# Patient Record
Sex: FEMALE | Race: WHITE | NOT HISPANIC OR LATINO | Employment: FULL TIME | URBAN - METROPOLITAN AREA
[De-identification: names, ages, dates, MRNs, and addresses within clinical notes are randomized per-mention and may not be internally consistent; named-entity substitution may affect disease eponyms.]

---

## 2017-08-14 ENCOUNTER — HOSPITAL ENCOUNTER (OUTPATIENT)
Dept: RADIOLOGY | Facility: HOSPITAL | Age: 42
Discharge: HOME | End: 2017-08-14
Attending: FAMILY MEDICINE
Payer: COMMERCIAL

## 2017-08-14 DIAGNOSIS — M25.561 KNEE PAIN, RIGHT: ICD-10-CM

## 2017-08-14 DIAGNOSIS — M25.561 KNEE PAIN, RIGHT: Primary | ICD-10-CM

## 2017-08-14 PROCEDURE — 73560 X-RAY EXAM OF KNEE 1 OR 2: CPT | Mod: 26,RT,, | Performed by: RADIOLOGY

## 2017-08-14 PROCEDURE — 73560 X-RAY EXAM OF KNEE 1 OR 2: CPT | Mod: TC,RT

## 2017-10-19 DIAGNOSIS — Z01.818 PRE-OP EVALUATION: Primary | ICD-10-CM

## 2017-10-26 ENCOUNTER — HOSPITAL ENCOUNTER (OUTPATIENT)
Dept: RADIOLOGY | Facility: OTHER | Age: 42
Discharge: HOME | End: 2017-10-26
Attending: PLASTIC SURGERY
Payer: COMMERCIAL

## 2017-10-26 DIAGNOSIS — Z01.818 PRE-OP EVALUATION: ICD-10-CM

## 2017-10-26 PROCEDURE — 25500020 PHARM REV CODE 255: Performed by: PLASTIC SURGERY

## 2017-10-26 PROCEDURE — 74174 CTA ABD&PLVS W/CONTRAST: CPT | Mod: TC

## 2017-10-26 PROCEDURE — 73706 CT ANGIO LWR EXTR W/O&W/DYE: CPT | Mod: TC,50

## 2017-10-26 PROCEDURE — 73706 CT ANGIO LWR EXTR W/O&W/DYE: CPT | Mod: 26,50,, | Performed by: RADIOLOGY

## 2017-10-26 PROCEDURE — 74174 CTA ABD&PLVS W/CONTRAST: CPT | Mod: 26,,, | Performed by: RADIOLOGY

## 2017-10-26 RX ADMIN — IOHEXOL 100 ML: 350 INJECTION, SOLUTION INTRAVENOUS at 02:10

## 2018-01-10 ENCOUNTER — OFFICE VISIT (OUTPATIENT)
Dept: URGENT CARE | Facility: CLINIC | Age: 43
End: 2018-01-10
Payer: COMMERCIAL

## 2018-01-10 VITALS
DIASTOLIC BLOOD PRESSURE: 90 MMHG | TEMPERATURE: 99 F | HEIGHT: 65 IN | SYSTOLIC BLOOD PRESSURE: 130 MMHG | OXYGEN SATURATION: 97 % | WEIGHT: 152 LBS | HEART RATE: 111 BPM | BODY MASS INDEX: 25.33 KG/M2

## 2018-01-10 DIAGNOSIS — J11.1 INFLUENZA: Primary | ICD-10-CM

## 2018-01-10 DIAGNOSIS — R52 BODY ACHES: ICD-10-CM

## 2018-01-10 DIAGNOSIS — R05.9 COUGH: ICD-10-CM

## 2018-01-10 PROCEDURE — 99203 OFFICE O/P NEW LOW 30 MIN: CPT | Mod: S$GLB,,, | Performed by: FAMILY MEDICINE

## 2018-01-10 RX ORDER — ANASTROZOLE 1 MG/1
TABLET ORAL
Refills: 2 | COMMUNITY
Start: 2017-12-12 | End: 2022-09-30

## 2018-01-10 RX ORDER — OSELTAMIVIR PHOSPHATE 75 MG/1
75 CAPSULE ORAL 2 TIMES DAILY
Qty: 10 CAPSULE | Refills: 0 | Status: SHIPPED | OUTPATIENT
Start: 2018-01-10 | End: 2018-01-15

## 2018-01-10 NOTE — PROGRESS NOTES
"Subjective:       Patient ID: Ksenia Arce is a 42 y.o. female.    Vitals:  height is 5' 5" (1.651 m) and weight is 68.9 kg (152 lb). Her temperature is 99.3 °F (37.4 °C). Her blood pressure is 130/90 (abnormal) and her pulse is 111 (abnormal). Her oxygen saturation is 97%.     Chief Complaint: URI    URI    This is a new problem. The current episode started yesterday. The problem has been gradually worsening. Associated symptoms include congestion, coughing, ear pain, headaches, nausea and a sore throat. Pertinent negatives include no abdominal pain, chest pain or wheezing. Treatments tried: Benadryl and Ibuprofen  The treatment provided no relief.     Review of Systems   Constitution: Positive for chills and malaise/fatigue. Negative for fever.   HENT: Positive for congestion, ear pain and sore throat. Negative for hoarse voice.    Eyes: Negative for discharge and redness.   Cardiovascular: Negative for chest pain, dyspnea on exertion and leg swelling.   Respiratory: Positive for cough. Negative for shortness of breath, sputum production and wheezing.    Musculoskeletal: Negative for myalgias.   Gastrointestinal: Positive for nausea. Negative for abdominal pain.   Neurological: Positive for headaches.       Objective:      Physical Exam   Constitutional: She is oriented to person, place, and time. She appears well-developed and well-nourished. She is cooperative.  Non-toxic appearance. She has a sickly appearance. She does not appear ill. No distress.   HENT:   Head: Normocephalic and atraumatic.   Right Ear: Hearing, tympanic membrane, external ear and ear canal normal.   Left Ear: Hearing, tympanic membrane, external ear and ear canal normal.   Nose: Nose normal. No mucosal edema, rhinorrhea or nasal deformity. No epistaxis. Right sinus exhibits no maxillary sinus tenderness and no frontal sinus tenderness. Left sinus exhibits no maxillary sinus tenderness and no frontal sinus tenderness.   Mouth/Throat: Uvula " is midline and mucous membranes are normal. No trismus in the jaw. Normal dentition. No uvula swelling. Posterior oropharyngeal erythema present.   Eyes: Conjunctivae and lids are normal. No scleral icterus.   Sclera clear bilat   Neck: Trachea normal, full passive range of motion without pain and phonation normal. Neck supple.   Cardiovascular: Normal rate, regular rhythm, normal heart sounds, intact distal pulses and normal pulses.    Pulmonary/Chest: Effort normal and breath sounds normal. No respiratory distress. She has no decreased breath sounds. She has no wheezes. She has no rhonchi. She has no rales.   Abdominal: Soft. Normal appearance and bowel sounds are normal. She exhibits no distension. There is no tenderness.   Musculoskeletal: Normal range of motion. She exhibits no edema or deformity.   Lymphadenopathy:        Head (right side): No submental and no submandibular adenopathy present.        Head (left side): No submental and no submandibular adenopathy present.        Right cervical: No superficial cervical adenopathy present.       Left cervical: No superficial cervical adenopathy present.   Neurological: She is alert and oriented to person, place, and time. She exhibits normal muscle tone. Coordination normal.   Skin: Skin is warm, dry and intact. She is not diaphoretic. No pallor.   Psychiatric: She has a normal mood and affect. Her speech is normal and behavior is normal. Judgment and thought content normal. Cognition and memory are normal.   Nursing note and vitals reviewed.      Assessment:       1. Influenza    2. Cough    3. Body aches        Plan:         Influenza  -     oseltamivir (TAMIFLU) 75 MG capsule; Take 1 capsule (75 mg total) by mouth 2 (two) times daily.  Dispense: 10 capsule; Refill: 0    Cough  -     POCT Influenza A/B    Body aches      Follow Up Comments   Make sure that you follow up with your primary care doctor in the next 2-5 days if needed .  Return to the Urgent Care if  signs or symptoms change and certainly if you have worsening symptoms go to the nearest emergency department for further evaluation.

## 2018-01-10 NOTE — LETTER
January 10, 2018      [facility] [place] [address]  Phone: [phone]  Fax: [phone]       Patient: Ksenia Arce   YOB: 1975  Date of Visit: 01/10/2018    To Whom It May Concern:    Jaswant Arce  was at [facility] on 01/10/2018. She may return to work/school on 01/12/2018 with no restrictions. If you have any questions or concerns, or if I can be of further assistance, please do not hesitate to contact me.    Sincerely,    Josselyn Shin MD

## 2018-01-10 NOTE — PATIENT INSTRUCTIONS
Influenza (Adult)    Influenza is also called the flu. It is a viral illness that affects the air passages of your lungs. It is different from the common cold. The flu can easily be passed from one to person to another. It may be spread through the air by coughing and sneezing. Or it can be spread by touching the sick person and then touching your own eyes, nose, or mouth.  The flu starts 1 to 3 days after you are exposed to the flu virus. It may last for 1 to 2 weeks but many people feel tired or fatigued for many weeks afterward. You usually dont need to take antibiotics unless you have a complication. This might be an ear or sinus infection or pneumonia.  Symptoms of the flu may be mild or severe. They can include extreme tiredness (wanting to stay in bed all day), chills, fevers, muscle aches, soreness with eye movement, headache, and a dry, hacking cough.  Home care  Follow these guidelines when caring for yourself at home:  · Avoid being around cigarette smoke, whether yours or other peoples.  · Acetaminophen or ibuprofen will help ease your fever, muscle aches, and headache. Dont give aspirin to anyone younger than 18 who has the flu. Aspirin can harm the liver.  · Nausea and loss of appetite are common with the flu. Eat light meals. Drink 6 to 8 glasses of liquids every day. Good choices are water, sport drinks, soft drinks without caffeine, juices, tea, and soup. Extra fluids will also help loosen secretions in your nose and lungs.  · Over-the-counter cold medicines will not make the flu go away faster. But the medicines may help with coughing, sore throat, and congestion in your nose and sinuses. Dont use a decongestant if you have high blood pressure.  · Stay home until your fever has been gone for at least 24 hours without using medicine to reduce fever.  Follow-up care  Follow up with your healthcare provider, or as advised, if you are not getting better over the next week.  If you are age 65 or  older, talk with your provider about getting a pneumococcal vaccine every 5 years. You should also get this vaccine if you have chronic asthma or COPD. All adults should get a flu vaccine every fall. Ask your provider about this.  When to seek medical advice  Call your healthcare provider right away if any of these occur:  · Cough with lots of colored mucus (sputum) or blood in your mucus  · Chest pain, shortness of breath, wheezing, or trouble breathing  · Severe headache, or face, neck, or ear pain  · New rash with fever  · Fever of 100.4°F (38°C) or higher, or as directed by your healthcare provider  · Confusion, behavior change, or seizure  · Severe weakness or dizziness  · You get a new fever or cough after getting better for a few days  Date Last Reviewed: 1/1/2017 © 2000-2017 [employer]. [address] [place] [address]. All rights reserved. This information is not intended as a substitute for professional medical care. Always follow your healthcare professional's instructions.      Ksenia was seen today for uri.    Diagnoses and all orders for this visit:    Influenza  -     oseltamivir (TAMIFLU) 75 MG capsule; Take 1 capsule (75 mg total) by mouth 2 (two) times daily.    Cough  -     POCT Influenza A/B    Body aches            Follow Up Comments   Make sure that you follow up with your primary care doctor in the next 2-5 days if needed .  Return to the Urgent Care if signs or symptoms change and certainly if you have worsening symptoms go to the nearest emergency department for further evaluation.     Josselyn Shin MD

## 2018-01-14 ENCOUNTER — OFFICE VISIT (OUTPATIENT)
Dept: URGENT CARE | Facility: CLINIC | Age: 43
End: 2018-01-14
Payer: COMMERCIAL

## 2018-01-14 VITALS
HEART RATE: 108 BPM | TEMPERATURE: 99 F | OXYGEN SATURATION: 97 % | BODY MASS INDEX: 25.33 KG/M2 | HEIGHT: 65 IN | WEIGHT: 152 LBS | RESPIRATION RATE: 16 BRPM | DIASTOLIC BLOOD PRESSURE: 92 MMHG | SYSTOLIC BLOOD PRESSURE: 135 MMHG

## 2018-01-14 DIAGNOSIS — J04.0 LARYNGITIS: Primary | ICD-10-CM

## 2018-01-14 DIAGNOSIS — R68.83 CHILLS (WITHOUT FEVER): ICD-10-CM

## 2018-01-14 DIAGNOSIS — R05.9 COUGH: ICD-10-CM

## 2018-01-14 LAB
CTP QC/QA: YES
FLUAV AG NPH QL: NEGATIVE
FLUBV AG NPH QL: NEGATIVE

## 2018-01-14 PROCEDURE — 96372 THER/PROPH/DIAG INJ SC/IM: CPT | Mod: S$GLB,,, | Performed by: FAMILY MEDICINE

## 2018-01-14 PROCEDURE — 87804 INFLUENZA ASSAY W/OPTIC: CPT | Mod: QW,S$GLB,, | Performed by: FAMILY MEDICINE

## 2018-01-14 PROCEDURE — 99214 OFFICE O/P EST MOD 30 MIN: CPT | Mod: 25,S$GLB,, | Performed by: FAMILY MEDICINE

## 2018-01-14 RX ORDER — PROMETHAZINE HYDROCHLORIDE AND CODEINE PHOSPHATE 6.25; 1 MG/5ML; MG/5ML
5 SOLUTION ORAL EVERY 6 HOURS PRN
Qty: 118 ML | Refills: 0 | Status: SHIPPED | OUTPATIENT
Start: 2018-01-14 | End: 2019-05-13

## 2018-01-14 RX ORDER — AZITHROMYCIN 250 MG/1
TABLET, FILM COATED ORAL
Qty: 6 TABLET | Refills: 0 | Status: SHIPPED | OUTPATIENT
Start: 2018-01-14 | End: 2019-05-13

## 2018-01-14 RX ORDER — FLUTICASONE PROPIONATE 50 MCG
2 SPRAY, SUSPENSION (ML) NASAL DAILY
Qty: 1 BOTTLE | Refills: 0 | Status: SHIPPED | OUTPATIENT
Start: 2018-01-14 | End: 2019-01-14

## 2018-01-14 RX ORDER — BETAMETHASONE SODIUM PHOSPHATE AND BETAMETHASONE ACETATE 3; 3 MG/ML; MG/ML
6 INJECTION, SUSPENSION INTRA-ARTICULAR; INTRALESIONAL; INTRAMUSCULAR; SOFT TISSUE
Status: COMPLETED | OUTPATIENT
Start: 2018-01-14 | End: 2018-01-14

## 2018-01-14 RX ADMIN — BETAMETHASONE SODIUM PHOSPHATE AND BETAMETHASONE ACETATE 6 MG: 3; 3 INJECTION, SUSPENSION INTRA-ARTICULAR; INTRALESIONAL; INTRAMUSCULAR; SOFT TISSUE at 11:01

## 2018-01-14 NOTE — LETTER
January 14, 2018      [facility] [place] [address]  Phone: [phone]  Fax: [phone]       Patient: Ksenia Arce   YOB: 1975  Date of Visit: 01/14/2018    To Whom It May Concern:    Jaswant Arce  was at [facility] on 01/14/2018. She may return to work/school on 01/16/2018 with no restrictions. If you have any questions or concerns, or if I can be of further assistance, please do not hesitate to contact me.    Sincerely,    Josselyn Shin MD

## 2018-01-14 NOTE — PROGRESS NOTES
"Subjective:       Patient ID: Ksenia Arce is a 42 y.o. female.    Vitals:  height is 5' 5" (1.651 m) and weight is 68.9 kg (152 lb). Her temperature is 98.6 °F (37 °C). Her blood pressure is 135/92 (abnormal) and her pulse is 108. Her respiration is 16 and oxygen saturation is 97%.     Chief Complaint: Cough    Cough   This is a new problem. Episode onset: 3 days. The problem has been gradually worsening. The problem occurs every few minutes. The cough is productive of sputum. Associated symptoms include chills, ear congestion, myalgias, nasal congestion and a sore throat. Pertinent negatives include no chest pain, ear pain, eye redness, fever, headaches, shortness of breath or wheezing. The symptoms are aggravated by lying down. She has tried prescription cough suppressant and OTC cough suppressant for the symptoms.     Review of Systems   Constitution: Positive for chills and malaise/fatigue. Negative for fever.   HENT: Positive for congestion and sore throat. Negative for ear pain and hoarse voice.    Eyes: Negative for discharge and redness.   Cardiovascular: Negative for chest pain, dyspnea on exertion and leg swelling.   Respiratory: Positive for cough and sputum production. Negative for shortness of breath and wheezing.    Musculoskeletal: Positive for myalgias.   Gastrointestinal: Positive for nausea. Negative for abdominal pain.   Neurological: Negative for headaches.       Objective:      Physical Exam   Constitutional: She is oriented to person, place, and time. She appears well-developed and well-nourished. She is cooperative.  Non-toxic appearance. She does not appear ill. No distress.   HENT:   Head: Normocephalic and atraumatic.   Right Ear: Hearing, tympanic membrane, external ear and ear canal normal.   Left Ear: Hearing, tympanic membrane, external ear and ear canal normal.   Nose: Nose normal. No mucosal edema, rhinorrhea or nasal deformity. No epistaxis. Right sinus exhibits no maxillary sinus " tenderness and no frontal sinus tenderness. Left sinus exhibits no maxillary sinus tenderness and no frontal sinus tenderness.   Mouth/Throat: Uvula is midline and mucous membranes are normal. No trismus in the jaw. Normal dentition. No uvula swelling. Posterior oropharyngeal edema and posterior oropharyngeal erythema present.   Eyes: Conjunctivae and lids are normal. No scleral icterus.   Sclera clear bilat   Neck: Trachea normal and full passive range of motion without pain. Neck supple.   Pat has lost her voice,  Whispering present.     Cardiovascular: Normal rate, regular rhythm, normal heart sounds, intact distal pulses and normal pulses.    Pulmonary/Chest: Effort normal and breath sounds normal. No respiratory distress. She has no decreased breath sounds. She has no wheezes. She has no rhonchi. She has no rales.   Abdominal: Soft. Normal appearance and bowel sounds are normal. She exhibits no distension. There is no tenderness.   Musculoskeletal: Normal range of motion. She exhibits no edema or deformity.   Neurological: She is alert and oriented to person, place, and time. She exhibits normal muscle tone. Coordination normal.   Skin: Skin is warm, dry and intact. She is not diaphoretic. No pallor.   Psychiatric: She has a normal mood and affect. Her speech is normal and behavior is normal. Judgment and thought content normal. Cognition and memory are normal.   Nursing note and vitals reviewed.      Assessment:       1. Laryngitis    2. Chills (without fever)    3. Cough        Plan:         Laryngitis  -     betamethasone acetate-betamethasone sodium phosphate injection 6 mg; Inject 1 mL (6 mg total) into the muscle one time.  -     fluticasone (FLONASE) 50 mcg/actuation nasal spray; 2 sprays (100 mcg total) by Each Nare route once daily.  Dispense: 1 Bottle; Refill: 0  -     azithromycin (ZITHROMAX Z-CRYS) 250 MG tablet; Take 2 tablets (500 mg) on  Day 1,  followed by 1 tablet (250 mg) once daily on Days  2 through 5.  Dispense: 6 tablet; Refill: 0  -     promethazine-codeine 6.25-10 mg/5 ml (PHENERGAN WITH CODEINE) 6.25-10 mg/5 mL syrup; Take 5 mLs by mouth every 6 (six) hours as needed.  Dispense: 118 mL; Refill: 0    Chills (without fever)  -     POCT Influenza A/B    Cough  -     betamethasone acetate-betamethasone sodium phosphate injection 6 mg; Inject 1 mL (6 mg total) into the muscle one time.  -     fluticasone (FLONASE) 50 mcg/actuation nasal spray; 2 sprays (100 mcg total) by Each Nare route once daily.  Dispense: 1 Bottle; Refill: 0  -     azithromycin (ZITHROMAX Z-CRYS) 250 MG tablet; Take 2 tablets (500 mg) on  Day 1,  followed by 1 tablet (250 mg) once daily on Days 2 through 5.  Dispense: 6 tablet; Refill: 0  -     promethazine-codeine 6.25-10 mg/5 ml (PHENERGAN WITH CODEINE) 6.25-10 mg/5 mL syrup; Take 5 mLs by mouth every 6 (six) hours as needed.  Dispense: 118 mL; Refill: 0      Follow Up Comments   Make sure that you follow up with your primary care doctor in the next 2-5 days if needed .  Return to the Urgent Care if signs or symptoms change and certainly if you have worsening symptoms go to the nearest emergency department for further evaluation.

## 2018-01-14 NOTE — PATIENT INSTRUCTIONS
Laryngitis    Laryngitis is a swelling of the tissues around the vocal cords. Symptoms include a hoarse (scratchy) voice. The voice may be lost completely. It may be caused by a viral illness, such as a head or chest cold. It may also be due to overuse and strain of the voice. Smoking, drinking alcohol, acid reflux, allergies, or inhaling harsh chemicals may also lead to symptoms. This condition will usually resolve in 1-2 weeks.  Home care  · Rest your voice until it recovers. Talk as little as possible. If your symptoms are severe, rest at home for a day or so.  · Breathing cool steam from a humidifier/vaporizer or in a steamy shower may be helpful.  · Drink plenty of fluids to stay well hydrated.  · Do not smoke  Follow-up care  Follow up with your healthcare provider or this facility if you have not improved after one week.  When to seek medical advice  Contact your healthcare provider for any of the following:  · Severe pain with swallowing  · Trouble opening mouth  · Neck swelling, neck pain, or trouble moving neck  · Noisy breathing or trouble breathing  · Fever of 100.4°F (38.ºC) or higher, or as directed by your healthcare provider  · Drooling  · Symptoms do not resolve in 2 weeks  Date Last Reviewed: 4/26/2015 © 2000-2017 [employer]. [address] [place] [address], [place] [address]. All rights reserved. This information is not intended as a substitute for professional medical care. Always follow your healthcare professional's instructions.      Ksenia was seen today for cough.    Diagnoses and all orders for this visit:    Laryngitis  -     betamethasone acetate-betamethasone sodium phosphate injection 6 mg; Inject 1 mL (6 mg total) into the muscle one time.  -     fluticasone (FLONASE) 50 mcg/actuation nasal spray; 2 sprays (100 mcg total) by Each Nare route once daily.  -     azithromycin (ZITHROMAX Z-CRYS) 250 MG tablet; Take 2 tablets (500 mg) on  Day 1,  followed by 1 tablet (250 mg) once  daily on Days 2 through 5.  -     promethazine-codeine 6.25-10 mg/5 ml (PHENERGAN WITH CODEINE) 6.25-10 mg/5 mL syrup; Take 5 mLs by mouth every 6 (six) hours as needed.    Chills (without fever)  -     POCT Influenza A/B    Cough  -     betamethasone acetate-betamethasone sodium phosphate injection 6 mg; Inject 1 mL (6 mg total) into the muscle one time.  -     fluticasone (FLONASE) 50 mcg/actuation nasal spray; 2 sprays (100 mcg total) by Each Nare route once daily.  -     azithromycin (ZITHROMAX Z-CRYS) 250 MG tablet; Take 2 tablets (500 mg) on  Day 1,  followed by 1 tablet (250 mg) once daily on Days 2 through 5.  -     promethazine-codeine 6.25-10 mg/5 ml (PHENERGAN WITH CODEINE) 6.25-10 mg/5 mL syrup; Take 5 mLs by mouth every 6 (six) hours as needed.            Follow Up Comments   Make sure that you follow up with your primary care doctor in the next 2-5 days if needed .  Return to the Urgent Care if signs or symptoms change and certainly if you have worsening symptoms go to the nearest emergency department for further evaluation.     Josselyn Shin MD

## 2018-04-28 ENCOUNTER — OFFICE VISIT (OUTPATIENT)
Dept: URGENT CARE | Facility: CLINIC | Age: 43
End: 2018-04-28
Payer: COMMERCIAL

## 2018-04-28 VITALS
HEIGHT: 65 IN | HEART RATE: 97 BPM | BODY MASS INDEX: 25.33 KG/M2 | TEMPERATURE: 98 F | OXYGEN SATURATION: 98 % | WEIGHT: 152 LBS | DIASTOLIC BLOOD PRESSURE: 75 MMHG | SYSTOLIC BLOOD PRESSURE: 105 MMHG

## 2018-04-28 DIAGNOSIS — H10.9 CONJUNCTIVITIS OF LEFT EYE, UNSPECIFIED CONJUNCTIVITIS TYPE: Primary | ICD-10-CM

## 2018-04-28 PROCEDURE — 99214 OFFICE O/P EST MOD 30 MIN: CPT | Mod: S$GLB,,, | Performed by: PHYSICIAN ASSISTANT

## 2018-04-28 RX ORDER — POLYMYXIN B SULFATE AND TRIMETHOPRIM 1; 10000 MG/ML; [USP'U]/ML
1 SOLUTION OPHTHALMIC EVERY 6 HOURS
Qty: 10 ML | Refills: 0 | Status: SHIPPED | OUTPATIENT
Start: 2018-04-28 | End: 2019-05-13

## 2018-04-28 NOTE — PATIENT INSTRUCTIONS
Conjunctivitis, Nonspecific    The membrane that covers the white part of your eye (the conjunctiva) is inflamed. Inflammation happens when your body responds to an injury, allergic reaction, infection, or illness. Symptoms of inflammation in the eye may include redness, irritation, itching, swelling, or burning. These symptoms should go away within the next 24 hours. Conjunctivitis may be related to a particle that was in your eye. If so, it may wash out with your tears or irrigation treatment. Being exposed to liquid chemicals or fumes may also cause this reaction.   Home care  · Apply a cold pack (ice in a plastic bag, wrapped in a towel) over the eye for 20 minutes at a time. This will reduce pain.  · Artificial tears may be prescribed to reduce irritation or redness.  These should be used 3 to 4 times a day.  · You may use acetaminophen or ibuprofen to control pain, unless another medicine was prescribed.(Note: If you have chronic liver or kidney disease, or if you have ever had a stomach ulcer or gastrointestinal bleeding, talk with your healthcare provider before using these medicines.)  Follow-up care  Follow up with your healthcare provider, or as advised.  When to seek medical advice  Call your healthcare provider right away if any of these occur:  · Increased eyelid swelling  · Increased eye pain  · Increased redness or drainage from the eye  · Increased blurry vision or increased sensitivity to light  · Failure of normal vision to return within 24 to 48 hours  Date Last Reviewed: 6/14/2015  © 4058-3682 [employer]. [address] [place] [address], [place] [id number]. All rights reserved. This information is not intended as a substitute for professional medical care. Always follow your healthcare professional's instructions.      Please follow up with your Primary care provider within 2-5 days if your signs and symptoms have not resolved or worsen.     If your condition worsens or fails to improve  we recommend that you receive another evaluation at the emergency room immediately or contact your primary medical clinic to discuss your concerns.   You must understand that you have received an Urgent Care treatment only and that you may be released before all of your medical problems are known or treated. You, the patient, will arrange for follow up care as instructed.

## 2018-04-28 NOTE — PROGRESS NOTES
"Subjective:       Patient ID: Ksenia Arce is a 42 y.o. female.    Vitals:  height is 5' 5" (1.651 m) and weight is 68.9 kg (152 lb). Her temperature is 98.2 °F (36.8 °C). Her blood pressure is 105/75 and her pulse is 97. Her oxygen saturation is 98%.     Chief Complaint: Conjunctivitis    Pt states that when she blinks she feels like something is in her eye. Denies visual change.      Conjunctivitis   This is a new problem. The current episode started yesterday. The problem occurs constantly. The problem has been unchanged. Pertinent negatives include no chills, congestion, fever, headaches, nausea, rash, sore throat, swollen glands, urinary symptoms, vertigo, visual change, vomiting or weakness. Nothing aggravates the symptoms. She has tried acetaminophen for the symptoms. The treatment provided no relief.     Review of Systems   Constitution: Negative for chills, fever and weakness.   HENT: Negative for congestion and sore throat.    Eyes: Positive for discharge, pain and redness. Negative for blurred vision and photophobia.   Skin: Negative for rash.   Gastrointestinal: Negative for nausea and vomiting.   Neurological: Negative for headaches and vertigo.       Objective:      Physical Exam   Constitutional: She is oriented to person, place, and time. She appears well-developed and well-nourished.   HENT:   Head: Normocephalic and atraumatic.   Right Ear: External ear normal.   Left Ear: External ear normal.   Nose: Nose normal.   Mouth/Throat: Oropharynx is clear and moist.   Eyes: EOM and lids are normal. Pupils are equal, round, and reactive to light. Right eye exhibits no chemosis, no discharge, no exudate and no hordeolum. No foreign body present in the right eye. Left eye exhibits exudate. Left eye exhibits no chemosis, no discharge and no hordeolum. No foreign body present in the left eye. Right conjunctiva is not injected. Right conjunctiva has no hemorrhage. Left conjunctiva is injected. Left conjunctiva " has no hemorrhage. No scleral icterus.   No signs of hyphema, hypopyon, corneal ulcer, corneal flare, foreign body, or corneal abrasion.    Neck: Trachea normal, full passive range of motion without pain and phonation normal. Neck supple.   Musculoskeletal: Normal range of motion.   Neurological: She is alert and oriented to person, place, and time.   Skin: Skin is warm, dry and intact.   Psychiatric: She has a normal mood and affect. Her speech is normal and behavior is normal. Judgment and thought content normal. Cognition and memory are normal.   Nursing note and vitals reviewed.      Assessment:       1. Conjunctivitis of left eye, unspecified conjunctivitis type        Plan:         Conjunctivitis of left eye, unspecified conjunctivitis type  -     polymyxin B sulf-trimethoprim (POLYTRIM) 10,000 unit- 1 mg/mL Drop; Place 1 drop into the left eye every 6 (six) hours.  Dispense: 10 mL; Refill: 0        Conjunctivitis, Nonspecific    The membrane that covers the white part of your eye (the conjunctiva) is inflamed. Inflammation happens when your body responds to an injury, allergic reaction, infection, or illness. Symptoms of inflammation in the eye may include redness, irritation, itching, swelling, or burning. These symptoms should go away within the next 24 hours. Conjunctivitis may be related to a particle that was in your eye. If so, it may wash out with your tears or irrigation treatment. Being exposed to liquid chemicals or fumes may also cause this reaction.   Home care  · Apply a cold pack (ice in a plastic bag, wrapped in a towel) over the eye for 20 minutes at a time. This will reduce pain.  · Artificial tears may be prescribed to reduce irritation or redness.  These should be used 3 to 4 times a day.  · You may use acetaminophen or ibuprofen to control pain, unless another medicine was prescribed.(Note: If you have chronic liver or kidney disease, or if you have ever had a stomach ulcer or  gastrointestinal bleeding, talk with your healthcare provider before using these medicines.)  Follow-up care  Follow up with your healthcare provider, or as advised.  When to seek medical advice  Call your healthcare provider right away if any of these occur:  · Increased eyelid swelling  · Increased eye pain  · Increased redness or drainage from the eye  · Increased blurry vision or increased sensitivity to light  · Failure of normal vision to return within 24 to 48 hours  Date Last Reviewed: 6/14/2015  © 4555-4654 [employer]. [address] [place] [address]. All rights reserved. This information is not intended as a substitute for professional medical care. Always follow your healthcare professional's instructions.      Please follow up with your Primary care provider within 2-5 days if your signs and symptoms have not resolved or worsen.     If your condition worsens or fails to improve we recommend that you receive another evaluation at the emergency room immediately or contact your primary medical clinic to discuss your concerns.   You must understand that you have received an Urgent Care treatment only and that you may be released before all of your medical problems are known or treated. You, the patient, will arrange for follow up care as instructed.

## 2018-05-01 ENCOUNTER — TELEPHONE (OUTPATIENT)
Dept: URGENT CARE | Facility: CLINIC | Age: 43
End: 2018-05-01

## 2019-05-13 ENCOUNTER — OFFICE VISIT (OUTPATIENT)
Dept: URGENT CARE | Facility: CLINIC | Age: 44
End: 2019-05-13
Payer: COMMERCIAL

## 2019-05-13 VITALS
WEIGHT: 152 LBS | SYSTOLIC BLOOD PRESSURE: 125 MMHG | HEART RATE: 76 BPM | HEIGHT: 65 IN | TEMPERATURE: 99 F | BODY MASS INDEX: 25.33 KG/M2 | DIASTOLIC BLOOD PRESSURE: 71 MMHG | RESPIRATION RATE: 16 BRPM

## 2019-05-13 DIAGNOSIS — L30.9 DERMATITIS: Primary | ICD-10-CM

## 2019-05-13 PROCEDURE — 99203 PR OFFICE/OUTPT VISIT, NEW, LEVL III, 30-44 MIN: ICD-10-PCS | Mod: S$GLB,,, | Performed by: PHYSICIAN ASSISTANT

## 2019-05-13 PROCEDURE — 3008F BODY MASS INDEX DOCD: CPT | Mod: CPTII,S$GLB,, | Performed by: PHYSICIAN ASSISTANT

## 2019-05-13 PROCEDURE — 99203 OFFICE O/P NEW LOW 30 MIN: CPT | Mod: S$GLB,,, | Performed by: PHYSICIAN ASSISTANT

## 2019-05-13 PROCEDURE — 3008F PR BODY MASS INDEX (BMI) DOCUMENTED: ICD-10-PCS | Mod: CPTII,S$GLB,, | Performed by: PHYSICIAN ASSISTANT

## 2019-05-13 RX ORDER — HYDROXYZINE HYDROCHLORIDE 25 MG/1
25 TABLET, FILM COATED ORAL 3 TIMES DAILY PRN
Qty: 12 TABLET | Refills: 0 | Status: SHIPPED | OUTPATIENT
Start: 2019-05-13 | End: 2020-01-23

## 2019-05-13 RX ORDER — FLUOXETINE 20 MG/5ML
SOLUTION ORAL DAILY
COMMUNITY
End: 2020-01-23

## 2019-05-13 RX ORDER — PREDNISONE 20 MG/1
40 TABLET ORAL DAILY
Qty: 8 TABLET | Refills: 0 | Status: SHIPPED | OUTPATIENT
Start: 2019-05-13 | End: 2019-05-17

## 2019-05-13 RX ORDER — FLUOCINOLONE ACETONIDE 0.11 MG/ML
5 OIL AURICULAR (OTIC) 2 TIMES DAILY
Qty: 1 BOTTLE | Refills: 0 | Status: SHIPPED | OUTPATIENT
Start: 2019-05-13 | End: 2019-05-20

## 2019-05-13 NOTE — PATIENT INSTRUCTIONS
Nonspecific Dermatitis  Dermatitis is a skin rash caused by something that touches the skin and makes it irritated and inflamed.  Your skin may be red, swollen, dry, and may be cracked. Blisters may form and ooze. The rash will itch.  Dermatitis can form on the face and neck, backs of hands, forearms, genitals, and lower legs. Dermatitis is not passed from person to person.  Talk with your health care provider about what may have caused the rash. Common things that cause skin allergies are metal in jewelry, plants like poison ivy or poison oak, and certain skin care products. You will need to avoid the source of your rash in the future to prevent it from coming back. In some cases, the cause of the dermatitis may not be found.  Treatment is done to relieve itching and prevent the rash from coming back. The rash should go away in a few days to a few weeks.  Home care  The health care provider may prescribe medications to relieve swelling and itching. Follow all instructions when using these medications.  · Avoid anything that heats up your skin, such as hot showers or baths, or direct sunlight. This can make itching worse.  · Stay away from whatever you think caused the rash.  · Bathe in warm, not hot, water. Apply a moisturizing lotion after bathing to prevent dry skin.  · Avoid skin irritants such as wool or silk clothing, grease, oils, harsh soaps, and detergents.  · Apply cold compresses to soothe your sores to help relieve your symptoms. Do this for 30 minutes 3 to 4 times a day. You can make a cold compress by soaking a cloth in cold water. Squeeze out excess water. You can add colloidal oatmeal to the water to help reduce itching. For severe itching in a small area, apply an ice pack wrapped in a thin towel. Do this for 20 minutes 3 to 4 times a day.  · You can also help relieve large areas of itching by taking a lukewarm bath with colloidal oatmeal added to the water.  · Use hydrocortisone cream for redness  and irritation, unless another medicine was prescribed. You can also use benzocaine anesthetic cream or spray.  · Use oral diphenhydramine to help reduce itching. This is an antihistamine you can buy at drug and grocery stores. It can make you sleepy, so use lower doses during the daytime. Or you can use loratadine. This is an antihistamine that will not make you sleepy. Dont use diphenhydramine if you have glaucoma or have trouble urinating because of an enlarged prostate.  · Wash your hands or use an antibacterial gel often to prevent the spread of the rash.  Follow-up care  Follow up with your health care provider. Make an appointment with your health care provider if your symptoms do not get better in the next 1 to 2 weeks.  When to seek medical advice  Call your health care provider right away if any of these occur:  · Spreading of the rash to other parts of your body  · Severe swelling of your face, eyelids, mouth, throat or tongue  · Trouble urinating due to swelling in the genital area  · Fever of 100.4°F (38°C) or higher  · Redness or swelling that gets worse  · Pain that gets worse  · Foul-smelling fluid leaking from the skin  · Yellow-brown crusts on the open blisters  · Joint pain   Date Last Reviewed: 7/23/2014  © 5352-2637 [employer]. [address] [place] [address], [place] [address]. All rights reserved. This information is not intended as a substitute for professional medical care. Always follow your healthcare professional's instructions.

## 2019-05-13 NOTE — PROGRESS NOTES
"Subjective:       Patient ID: Ksenia Arce is a 43 y.o. female.    Vitals:  height is 5' 5" (1.651 m) and weight is 68.9 kg (152 lb). Her temperature is 98.8 °F (37.1 °C). Her blood pressure is 125/71 and her pulse is 76. Her respiration is 16.     Chief Complaint: Rash    Patient presents with bilateral ear pruritus and rash.  Patient states she used an earwax removal and believe she had an allergic reaction to it.  She denies URI symptoms, ear pain, drainage, hearing loss.     Rash   This is a new (RASH TO EARS ) problem. The current episode started in the past 7 days (3 DAYS). The problem has been gradually worsening since onset. Pertinent negatives include no cough, fever or sore throat. Past treatments include anti-itch cream and topical steroids (TRIAMCINOLONE).       Constitution: Negative for chills and fever.   HENT: Negative for facial swelling and sore throat.    Neck: Negative for painful lymph nodes.   Eyes: Negative for eye itching and eyelid swelling.   Respiratory: Negative for cough.    Musculoskeletal: Negative for joint pain and joint swelling.   Skin: Positive for color change and rash. Negative for pale, wound, abrasion, laceration, lesion, skin thickening/induration, puncture wound, erythema, bruising, abscess, avulsion and hives.   Allergic/Immunologic: Negative for environmental allergies, immunocompromised state and hives.   Hematologic/Lymphatic: Negative for swollen lymph nodes.       Objective:      Physical Exam   Constitutional: She is oriented to person, place, and time. She appears well-developed and well-nourished.   HENT:   Head: Normocephalic and atraumatic. Head is without abrasion, without contusion and without laceration.   Right Ear: Tympanic membrane and external ear normal. No drainage or swelling.   Left Ear: Tympanic membrane and external ear normal. No drainage or swelling.   Nose: Nose normal.   Mouth/Throat: Oropharynx is clear and moist.   Pinkish rash of the EAC " bilaterally as well as ear lobes and inferior 1/3 of the helix.   Eyes: Pupils are equal, round, and reactive to light. Conjunctivae, EOM and lids are normal.   Neck: Trachea normal, full passive range of motion without pain and phonation normal. Neck supple.   Cardiovascular: Normal rate, regular rhythm and normal heart sounds.   Pulmonary/Chest: Effort normal and breath sounds normal. No stridor. No respiratory distress.   Musculoskeletal: Normal range of motion.   Neurological: She is alert and oriented to person, place, and time.   Skin: Skin is warm, dry and intact. Capillary refill takes less than 2 seconds. Rash noted. No abrasion, no bruising, no burn, no ecchymosis, no laceration and no lesion noted. Rash is macular (Bilateral ears). No erythema.   Psychiatric: She has a normal mood and affect. Her speech is normal and behavior is normal. Judgment and thought content normal. Cognition and memory are normal.   Nursing note and vitals reviewed.      Assessment:       1. Dermatitis        Plan:         Dermatitis  -     predniSONE (DELTASONE) 20 MG tablet; Take 2 tablets (40 mg total) by mouth once daily. for 4 days  Dispense: 8 tablet; Refill: 0  -     hydrOXYzine HCl (ATARAX) 25 MG tablet; Take 1 tablet (25 mg total) by mouth 3 (three) times daily as needed for Itching (May cause drowsiness.).  Dispense: 12 tablet; Refill: 0  -     fluocinolone acetonide oil 0.01 % Drop; Place 5 drops in ear(s) 2 (two) times daily. for 7 days  Dispense: 1 Bottle; Refill: 0      Patient Instructions     Nonspecific Dermatitis  Dermatitis is a skin rash caused by something that touches the skin and makes it irritated and inflamed.  Your skin may be red, swollen, dry, and may be cracked. Blisters may form and ooze. The rash will itch.  Dermatitis can form on the face and neck, backs of hands, forearms, genitals, and lower legs. Dermatitis is not passed from person to person.  Talk with your health care provider about what may  have caused the rash. Common things that cause skin allergies are metal in jewelry, plants like poison ivy or poison oak, and certain skin care products. You will need to avoid the source of your rash in the future to prevent it from coming back. In some cases, the cause of the dermatitis may not be found.  Treatment is done to relieve itching and prevent the rash from coming back. The rash should go away in a few days to a few weeks.  Home care  The health care provider may prescribe medications to relieve swelling and itching. Follow all instructions when using these medications.  · Avoid anything that heats up your skin, such as hot showers or baths, or direct sunlight. This can make itching worse.  · Stay away from whatever you think caused the rash.  · Bathe in warm, not hot, water. Apply a moisturizing lotion after bathing to prevent dry skin.  · Avoid skin irritants such as wool or silk clothing, grease, oils, harsh soaps, and detergents.  · Apply cold compresses to soothe your sores to help relieve your symptoms. Do this for 30 minutes 3 to 4 times a day. You can make a cold compress by soaking a cloth in cold water. Squeeze out excess water. You can add colloidal oatmeal to the water to help reduce itching. For severe itching in a small area, apply an ice pack wrapped in a thin towel. Do this for 20 minutes 3 to 4 times a day.  · You can also help relieve large areas of itching by taking a lukewarm bath with colloidal oatmeal added to the water.  · Use hydrocortisone cream for redness and irritation, unless another medicine was prescribed. You can also use benzocaine anesthetic cream or spray.  · Use oral diphenhydramine to help reduce itching. This is an antihistamine you can buy at drug and grocery stores. It can make you sleepy, so use lower doses during the daytime. Or you can use loratadine. This is an antihistamine that will not make you sleepy. Dont use diphenhydramine if you have glaucoma or have  trouble urinating because of an enlarged prostate.  · Wash your hands or use an antibacterial gel often to prevent the spread of the rash.  Follow-up care  Follow up with your health care provider. Make an appointment with your health care provider if your symptoms do not get better in the next 1 to 2 weeks.  When to seek medical advice  Call your health care provider right away if any of these occur:  · Spreading of the rash to other parts of your body  · Severe swelling of your face, eyelids, mouth, throat or tongue  · Trouble urinating due to swelling in the genital area  · Fever of 100.4°F (38°C) or higher  · Redness or swelling that gets worse  · Pain that gets worse  · Foul-smelling fluid leaking from the skin  · Yellow-brown crusts on the open blisters  · Joint pain   Date Last Reviewed: 7/23/2014  © 3826-5298 [employer]. [address] [place] [address], [place] [address]. All rights reserved. This information is not intended as a substitute for professional medical care. Always follow your healthcare professional's instructions.

## 2020-01-09 ENCOUNTER — PATIENT OUTREACH (OUTPATIENT)
Dept: ADMINISTRATIVE | Facility: HOSPITAL | Age: 45
End: 2020-01-09

## 2020-01-09 ENCOUNTER — TELEPHONE (OUTPATIENT)
Dept: ADMINISTRATIVE | Facility: HOSPITAL | Age: 45
End: 2020-01-09

## 2020-01-23 ENCOUNTER — OFFICE VISIT (OUTPATIENT)
Dept: FAMILY MEDICINE | Facility: CLINIC | Age: 45
End: 2020-01-23
Payer: COMMERCIAL

## 2020-01-23 VITALS
HEIGHT: 65 IN | OXYGEN SATURATION: 98 % | HEART RATE: 93 BPM | WEIGHT: 152.56 LBS | DIASTOLIC BLOOD PRESSURE: 72 MMHG | TEMPERATURE: 99 F | BODY MASS INDEX: 25.42 KG/M2 | SYSTOLIC BLOOD PRESSURE: 108 MMHG

## 2020-01-23 DIAGNOSIS — F41.1 GENERALIZED ANXIETY DISORDER: ICD-10-CM

## 2020-01-23 DIAGNOSIS — C50.212 MALIGNANT NEOPLASM OF UPPER-INNER QUADRANT OF LEFT FEMALE BREAST, UNSPECIFIED ESTROGEN RECEPTOR STATUS: ICD-10-CM

## 2020-01-23 DIAGNOSIS — B00.9 HERPES SIMPLEX: ICD-10-CM

## 2020-01-23 DIAGNOSIS — G43.709 CHRONIC MIGRAINE WITHOUT AURA WITHOUT STATUS MIGRAINOSUS, NOT INTRACTABLE: ICD-10-CM

## 2020-01-23 DIAGNOSIS — Z00.00 WELLNESS EXAMINATION: ICD-10-CM

## 2020-01-23 DIAGNOSIS — Z90.710 HISTORY OF TOTAL ABDOMINAL HYSTERECTOMY: ICD-10-CM

## 2020-01-23 PROBLEM — R52 BODY ACHES: Status: RESOLVED | Noted: 2018-01-10 | Resolved: 2020-01-23

## 2020-01-23 PROBLEM — J11.1 INFLUENZA: Status: RESOLVED | Noted: 2018-01-10 | Resolved: 2020-01-23

## 2020-01-23 PROBLEM — J04.0 LARYNGITIS: Status: RESOLVED | Noted: 2018-01-14 | Resolved: 2020-01-23

## 2020-01-23 PROBLEM — R05.9 COUGH: Status: RESOLVED | Noted: 2018-01-10 | Resolved: 2020-01-23

## 2020-01-23 PROBLEM — C50.912 MALIGNANT NEOPLASM OF LEFT BREAST: Status: ACTIVE | Noted: 2020-01-23

## 2020-01-23 PROBLEM — R68.83 CHILLS (WITHOUT FEVER): Status: RESOLVED | Noted: 2018-01-14 | Resolved: 2020-01-23

## 2020-01-23 PROBLEM — G43.909 MIGRAINES: Status: ACTIVE | Noted: 2020-01-23

## 2020-01-23 PROCEDURE — 99999 PR PBB SHADOW E&M-EST. PATIENT-LVL IV: ICD-10-PCS | Mod: PBBFAC,,, | Performed by: INTERNAL MEDICINE

## 2020-01-23 PROCEDURE — 99999 PR PBB SHADOW E&M-EST. PATIENT-LVL IV: CPT | Mod: PBBFAC,,, | Performed by: INTERNAL MEDICINE

## 2020-01-23 PROCEDURE — 99396 PR PREVENTIVE VISIT,EST,40-64: ICD-10-PCS | Mod: S$GLB,,, | Performed by: INTERNAL MEDICINE

## 2020-01-23 PROCEDURE — 99396 PREV VISIT EST AGE 40-64: CPT | Mod: S$GLB,,, | Performed by: INTERNAL MEDICINE

## 2020-01-23 RX ORDER — FLUOXETINE HYDROCHLORIDE 40 MG/1
1 CAPSULE ORAL DAILY
COMMUNITY
Start: 2020-01-06 | End: 2022-11-17

## 2020-01-23 RX ORDER — HYDROCODONE BITARTRATE AND ACETAMINOPHEN 7.5; 325 MG/1; MG/1
1 TABLET ORAL DAILY PRN
COMMUNITY
End: 2020-12-08

## 2020-01-23 RX ORDER — LORAZEPAM 0.5 MG/1
1 TABLET ORAL DAILY PRN
COMMUNITY
Start: 2018-12-05 | End: 2023-05-18 | Stop reason: SDUPTHER

## 2020-01-23 RX ORDER — GALCANEZUMAB 120 MG/ML
1 INJECTION, SOLUTION SUBCUTANEOUS
COMMUNITY
Start: 2020-01-10 | End: 2020-09-25 | Stop reason: SDUPTHER

## 2020-01-23 RX ORDER — ASCORBIC ACID 500 MG
500 TABLET ORAL DAILY
COMMUNITY
Start: 2017-03-17 | End: 2022-01-10

## 2020-01-23 RX ORDER — ONDANSETRON 4 MG/1
1 TABLET, ORALLY DISINTEGRATING ORAL EVERY 8 HOURS PRN
COMMUNITY
Start: 2020-12-08 | End: 2020-12-08 | Stop reason: SDUPTHER

## 2020-01-23 RX ORDER — VALACYCLOVIR HYDROCHLORIDE 1 G/1
1 TABLET, FILM COATED ORAL 2 TIMES DAILY PRN
COMMUNITY
End: 2020-03-10 | Stop reason: SDUPTHER

## 2020-01-23 RX ORDER — RIZATRIPTAN BENZOATE 10 MG/1
1 TABLET ORAL 2 TIMES DAILY PRN
COMMUNITY
Start: 2019-10-18 | End: 2020-12-08 | Stop reason: SDUPTHER

## 2020-01-23 RX ORDER — NARATRIPTAN 2.5 MG/1
1 TABLET ORAL 2 TIMES DAILY PRN
COMMUNITY
Start: 2019-10-18 | End: 2020-12-08

## 2020-01-23 RX ORDER — ASCORBIC ACID 125 MG
1 TABLET,CHEWABLE ORAL DAILY
COMMUNITY
Start: 2017-03-17 | End: 2022-11-17

## 2020-01-23 NOTE — PROGRESS NOTES
[facility] Note    Chief Complaint      Chief Complaint   Patient presents with    [facility]     former pcp dr angie pham     History of Present Illness      Ksenia Arce is a 44 y.o. female who presents today for annual wellness exam and to [facility] care, pt is new to me no prior pcp     Active Problem List with Overview Notes    Diagnosis Date Noted    Wellness examination 01/23/2020     Vaccines and age appropriate cancer screening up to date  Will order basic labs and fwd to other physicians following her case     Works for [employer], sees MDs at , building a house in [facility]       Malignant neoplasm of left breast 01/23/2020     lobualr breast cancer of L breast dx in 5/2016 found on BSE, felt mass, had us and MMG was told was cyst but 2/2 pain sought eval by Dr surgeon Sr Lopez who did bx  -had mastectomy on L side and LN dissection , was in 6/9, chemo mastectomy radiation, taxol and doxirubicin, radiation x36, completed in 5/17, has been on arimidex since then, will beon for 10 total   -gets mmg and us q6 mos warner  Seedonaldo steve next month for DEXA   Onc Julia Steve  Br Dania Lopez  Gyn Sy   Plastics [facility]   Had brca testing which was negative, er positive, her2 neg         Migraines 01/23/2020     Has been having for years, sees sal HA specialists at [facility], was on botox and ppx triptan, is now om emgality which has greatly improved, still has break through takes rizatriptan and naratriptanand vicodin for breakthrough, plus zofran for associated nausea, no aura, hypersensitive to smells during episode       Generalized anxiety disorder 01/23/2020     On daily prozac since dx of breast cancer has lots of concerns of helath and has difficulty controlling worry, sees adam flores, uses prn ativan 2-3x a month which has decreased, feels mood is improved and anxiety controllable, no depression or SI       Herpes simplex 01/23/2020     Takes valcyte prn  outbreak, oral lesions      History of total abdominal hysterectomy 01/23/2020     S/p with Sy          Health Maintenance   Topic Date Due    Lipid Panel  1973    Pneumococcal Vaccine (Highest Risk) (1 of 3 - PCV13) 10/17/1992    Pap Smear with HPV Cotest  10/17/1994    TETANUS VACCINE  12/06/2012    Mammogram  12/16/2021       Pap: [facility] 11/18 nl   MMG/us: [facility] 11/2019  DEXA: next month   Td: 2007  Flu: utd  Tobacco:  never  Other MDs:   Fam Hx breast, colon, lung: no colon or lung ca  Paternal grandmother breast dx in 70s,     Past Medical History:   Diagnosis Date    Breast cancer 05/2016    H/O total hysterectomy     Migraine headache        Past Surgical History:   Procedure Laterality Date    BREAST CAPSULECTOMY      MASTECTOMY Left        family history includes Heart disease in her father.    Social History     Tobacco Use    Smoking status: Never Smoker    Smokeless tobacco: Never Used   Substance Use Topics    Alcohol use: Yes    Drug use: Not on file       Review of Systems   Constitutional: Negative for chills and fever.   HENT: Negative for congestion and sore throat.    Eyes: Negative for blurred vision and discharge.   Respiratory: Negative for cough and shortness of breath.    Cardiovascular: Negative for chest pain and palpitations.   Gastrointestinal: Negative for constipation, diarrhea, nausea and vomiting.   Genitourinary: Negative for dysuria and hematuria.   Musculoskeletal: Negative for falls and myalgias.   Skin: Negative for itching and rash.   Neurological: Negative for dizziness and headaches.        Outpatient Encounter Medications as of 1/23/2020   Medication Sig Note Dispense Refill    anastrozole (ARIMIDEX) 1 mg Tab TK 1 T PO QD 1/10/2018: Received from: External Pharmacy  2    ascorbic acid, vitamin C, (VITAMIN C) 500 MG tablet Take 500 mg by mouth once daily.       cholecalciferol, vitamin D3, (VITAMIN D3) 1,000 unit Chew Take 1 tablet by mouth once  "daily.       EMGALITY  mg/mL PnIj Inject 1 mL into the skin every 30 days.       FLUoxetine 40 MG capsule Take 1 capsule by mouth once daily.       HYDROcodone-acetaminophen (NORCO) 7.5-325 mg per tablet Take 1 tablet by mouth daily as needed.       LORazepam (ATIVAN) 0.5 MG tablet Take 1 tablet by mouth daily as needed.       naratriptan (AMERGE) 2.5 MG tablet Take 1 tablet by mouth 2 (two) times daily as needed.       ondansetron (ZOFRAN-ODT) 4 MG TbDL Take 1 tablet by mouth every 4 to 6 hours as needed.       rizatriptan (MAXALT) 10 MG tablet Take 1 tablet by mouth 2 (two) times daily as needed.       valACYclovir (VALTREX) 1000 MG tablet Take 1 tablet by mouth 2 (two) times daily as needed.       [DISCONTINUED] FLUoxetine (PROZAC) 20 mg/5 mL (4 mg/mL) solution Take by mouth once daily.       [DISCONTINUED] hydrOXYzine HCl (ATARAX) 25 MG tablet Take 1 tablet (25 mg total) by mouth 3 (three) times daily as needed for Itching (May cause drowsiness.).  12 tablet 0    [DISCONTINUED] tamsulosin (FLOMAX) 0.4 mg Cp24 Take 1 capsule (0.4 mg total) by mouth once daily.  10 capsule 0     No facility-administered encounter medications on file as of 1/23/2020.         Review of patient's allergies indicates:  No Known Allergies        Physical Exam      Vital Signs  Temp: 98.8 °F (37.1 °C)  Temp src: Oral  Pulse: 93  SpO2: 98 %  BP: 108/72  BP Location: Right arm  Patient Position: Sitting  Pain Score: 0-No pain  Height and Weight  Height: 5' 5" (165.1 cm)  Weight: 69.2 kg (152 lb 8.9 oz)  BSA (Calculated - sq m): 1.78 sq meters  BMI (Calculated): 25.4  Weight in (lb) to have BMI = 25: 149.9]    Physical Exam   Constitutional: She is oriented to person, place, and time. She appears well-developed and well-nourished.   HENT:   Head: Normocephalic and atraumatic.   Right Ear: External ear normal.   Left Ear: External ear normal.   Eyes: Right eye exhibits no discharge. Left eye exhibits no discharge.   Neck: " Normal range of motion. No thyromegaly present.   Cardiovascular: Normal rate, regular rhythm, normal heart sounds and intact distal pulses.   No murmur heard.  Pulmonary/Chest: Effort normal and breath sounds normal. No respiratory distress.   Abdominal: Soft. Bowel sounds are normal. She exhibits no distension. There is no tenderness.   Musculoskeletal: Normal range of motion. She exhibits no deformity.   Neurological: She is alert and oriented to person, place, and time.   Skin: Skin is warm and dry. No rash noted.   Psychiatric: She has a normal mood and affect. Her behavior is normal.          Laboratory & Radiology:  None for review       Assessment/Plan     Ksenia Arce is a 44 y.o.female with:    Migraines  Ct meds as indicated and fu with neuro as scheduled     Generalized anxiety disorder  Management per psych, seems stable, offered re fills     Wellness examination  Annual labs    Malignant neoplasm of left breast  Continue management as per Onc and Surgery recs       Orders Placed This Encounter   Procedures    CBC auto differential     Standing Status:   Future     Number of Occurrences:   1     Standing Expiration Date:   3/23/2021    Lipid panel     Standing Status:   Future     Number of Occurrences:   1     Standing Expiration Date:   3/23/2021    Comprehensive metabolic panel     Standing Status:   Future     Number of Occurrences:   1     Standing Expiration Date:   3/23/2021    TSH     Standing Status:   Future     Number of Occurrences:   1     Standing Expiration Date:   3/23/2021    T4, free     Standing Status:   Future     Number of Occurrences:   1     Standing Expiration Date:   3/23/2021       -Continue current medications and maintain follow up with specialists.  Return to clinic in 6 months.    Lourdes Felipe MD  1/23/2020 1:41 PM    [facility]

## 2020-02-05 ENCOUNTER — TELEPHONE (OUTPATIENT)
Dept: FAMILY MEDICINE | Facility: CLINIC | Age: 45
End: 2020-02-05

## 2020-02-05 LAB
ALBUMIN: 4.5 GRAM/DL (ref 3.5–5)
ALP SERPL-CCNC: 77 UNIT/L (ref 38–126)
ALT SERPL W P-5'-P-CCNC: 24 UNIT/L (ref 7–56)
ANION GAP SERPL CALC-SCNC: 18 MEQ/L (ref 9–18)
AST SERPL-CCNC: 22 UNIT/L (ref 7–40)
BASOPHILS ABSOLUTE COUNT: 0 K/UL (ref 0–0.2)
BASOPHILS NFR BLD: 0.5 % (ref 0–2)
BILIRUB SERPL-MCNC: 0.6 MG/DL (ref 0–1.2)
BUN BLD-MCNC: 17 MG/DL (ref 7–21)
BUN/CREAT SERPL: 24 RATIO (ref 6–22)
CALC OSMOLALITY: 278 MOSM/KG (ref 275–295)
CALCIUM SERPL-MCNC: 10.2 MG/DL (ref 8.5–10.4)
CHLORIDE SERPL-SCNC: 99 MEQ/L (ref 98–107)
CHOL/HDLC RATIO: 4
CHOLEST SERPL-MSCNC: 262 MG/DL (ref 100–200)
CO2 SERPL-SCNC: 27 MEQ/L (ref 21–31)
CREAT SERPL-MCNC: 0.7 MG/DL (ref 0.5–1)
DIFFERENTIAL TYPE: ABNORMAL
EOSINOPHIL NFR BLD: 5.3 % (ref 0–4)
EOSINOPHILS ABSOLUTE COUNT: 0.2 K/UL (ref 0–0.7)
ERYTHROCYTE [DISTWIDTH] IN BLOOD BY AUTOMATED COUNT: 13 GRAM/DL (ref 12–15.3)
FREE T4: 1.1 NANOGRAM/DL (ref 0.9–1.7)
GFR: 93.2 ML/MIN/1.73M2
GLUCOSE SERPL-MCNC: 86 MG/DL (ref 70–100)
HCT VFR BLD AUTO: 42.7 % (ref 37–47)
HDLC SERPL-MCNC: 69 MG/DL (ref 40–75)
HGB BLD-MCNC: 15 GRAM/DL (ref 12–16)
LDLC SERPL CALC-MCNC: 175 MG/DL (ref 0–125)
LYMPHOCYTES %: 25.1 % (ref 15–45)
LYMPHOCYTES ABSOLUTE COUNT: 0.9 K/UL (ref 1–4.2)
MCH RBC QN AUTO: 30.1 PICOGRAM (ref 27–33)
MCHC RBC AUTO-ENTMCNC: 35.1 GRAM/DL (ref 32–36)
MCV RBC AUTO: 85.9 FEMTOLITER (ref 81–99)
MONOCYTES %: 9.8 % (ref 3–13)
MONOCYTES ABSOLUTE COUNT: 0.4 K/UL (ref 0.1–0.8)
NEUTROPHILS ABSOLUTE COUNT: 2.1 K/UL (ref 2.1–7.6)
NEUTROPHILS RELATIVE PERCENT: 59.3 % (ref 32–80)
NONHDLC SERPL-MCNC: 193 MG/DL (ref 60–125)
PLATELET # BLD AUTO: 226 K/UL (ref 150–350)
PMV BLD AUTO: 8.4 FEMTOLITER (ref 7–10.2)
POTASSIUM SERPL-SCNC: 4.9 MEQ/L (ref 3.5–5)
RBC # BLD AUTO: 4.97 MIL/UL (ref 4.2–5.4)
SODIUM BLD-SCNC: 139 MEQ/L (ref 135–145)
TOTAL PROTEIN: 6.9 GRAM/DL (ref 6.3–8.2)
TRIGL SERPL-MCNC: 111 MG/DL (ref 30–150)
TSH SERPL DL<=0.005 MIU/L-ACNC: 1.89 UIL/ML (ref 0.35–4)
WBC # BLD AUTO: 3.6 K/UL (ref 4.5–11)

## 2020-02-05 NOTE — PROGRESS NOTES
Labs reviewed   LDL cholesterol is elevated, should be less than 170 but based on risk factors does not need statin     The 10-year ASCVD risk score (Marlo RICHTER Jr., et al., 2013) is: 0.7%    Values used to calculate the score:      Age: 44 years      Sex: Female      Is Non- : No      Diabetic: No       Tobacco smoker: No      Systolic Blood Pressure: 108 mmHg      Is BP treated: No      HDL Cholesterol: 69 mg/dL      Total Cholesterol: 262 mg/dL

## 2020-02-05 NOTE — TELEPHONE ENCOUNTER
----- Message from Chip Sorensen sent at 2/5/2020  4:26 PM CST -----  Contact: [phone] / self   Patient is returning a call from your office. Please Advise.

## 2020-03-10 ENCOUNTER — PATIENT MESSAGE (OUTPATIENT)
Dept: FAMILY MEDICINE | Facility: CLINIC | Age: 45
End: 2020-03-10

## 2020-03-10 RX ORDER — VALACYCLOVIR HYDROCHLORIDE 1 G/1
1000 TABLET, FILM COATED ORAL 2 TIMES DAILY PRN
Qty: 30 TABLET | Refills: 3 | Status: SHIPPED | OUTPATIENT
Start: 2020-03-10 | End: 2023-02-09

## 2020-04-27 PROBLEM — Z00.00 WELLNESS EXAMINATION: Status: RESOLVED | Noted: 2020-01-23 | Resolved: 2020-04-27

## 2020-06-22 ENCOUNTER — LAB VISIT (OUTPATIENT)
Dept: FAMILY MEDICINE | Facility: CLINIC | Age: 45
End: 2020-06-22
Payer: COMMERCIAL

## 2020-06-22 ENCOUNTER — TELEPHONE (OUTPATIENT)
Dept: FAMILY MEDICINE | Facility: CLINIC | Age: 45
End: 2020-06-22

## 2020-06-22 DIAGNOSIS — Z20.822 CLOSE EXPOSURE TO COVID-19 VIRUS: Primary | ICD-10-CM

## 2020-06-22 DIAGNOSIS — Z20.822 CLOSE EXPOSURE TO COVID-19 VIRUS: ICD-10-CM

## 2020-06-22 PROCEDURE — U0003 INFECTIOUS AGENT DETECTION BY NUCLEIC ACID (DNA OR RNA); SEVERE ACUTE RESPIRATORY SYNDROME CORONAVIRUS 2 (SARS-COV-2) (CORONAVIRUS DISEASE [COVID-19]), AMPLIFIED PROBE TECHNIQUE, MAKING USE OF HIGH THROUGHPUT TECHNOLOGIES AS DESCRIBED BY CMS-2020-01-R: HCPCS

## 2020-06-22 NOTE — TELEPHONE ENCOUNTER
----- Message from Leslie Mullins sent at 6/22/2020 11:14 AM CDT -----  Contact: [phone]  Type:  Needs Medical Advice    Who Called: PT  Reason for Call: pt would like to be tested for Covid, due to son in law tested postive and lives in the House   How long has patient had these symptoms:  no symptoms  Pharmacy name and phone #: N/A  Would the patient rather a call back or a response via [facility]? Call Back  Best Call Back Number: [phone]  Additional Information: N/A

## 2020-06-24 LAB — SARS-COV-2 RNA RESP QL NAA+PROBE: NOT DETECTED

## 2020-07-15 ENCOUNTER — TELEPHONE (OUTPATIENT)
Dept: ADMINISTRATIVE | Facility: HOSPITAL | Age: 45
End: 2020-07-15

## 2020-07-29 ENCOUNTER — OFFICE VISIT (OUTPATIENT)
Dept: FAMILY MEDICINE | Facility: CLINIC | Age: 45
End: 2020-07-29
Payer: COMMERCIAL

## 2020-07-29 VITALS
TEMPERATURE: 99 F | SYSTOLIC BLOOD PRESSURE: 104 MMHG | BODY MASS INDEX: 25.01 KG/M2 | HEIGHT: 65 IN | WEIGHT: 150.13 LBS | DIASTOLIC BLOOD PRESSURE: 80 MMHG | OXYGEN SATURATION: 98 % | RESPIRATION RATE: 16 BRPM | HEART RATE: 79 BPM

## 2020-07-29 DIAGNOSIS — Z11.59 ENCOUNTER FOR HEPATITIS C SCREENING TEST FOR LOW RISK PATIENT: ICD-10-CM

## 2020-07-29 DIAGNOSIS — Z23 NEED FOR PNEUMOCOCCAL VACCINATION: ICD-10-CM

## 2020-07-29 DIAGNOSIS — C50.212 MALIGNANT NEOPLASM OF UPPER-INNER QUADRANT OF LEFT FEMALE BREAST, UNSPECIFIED ESTROGEN RECEPTOR STATUS: Primary | ICD-10-CM

## 2020-07-29 DIAGNOSIS — Z11.4 ENCOUNTER FOR SCREENING FOR HIV: ICD-10-CM

## 2020-07-29 DIAGNOSIS — F41.1 GENERALIZED ANXIETY DISORDER: ICD-10-CM

## 2020-07-29 DIAGNOSIS — L40.9 PSORIASIS: ICD-10-CM

## 2020-07-29 DIAGNOSIS — Z00.00 WELLNESS EXAMINATION: ICD-10-CM

## 2020-07-29 DIAGNOSIS — R07.81 RIB PAIN ON RIGHT SIDE: ICD-10-CM

## 2020-07-29 DIAGNOSIS — G43.709 CHRONIC MIGRAINE WITHOUT AURA WITHOUT STATUS MIGRAINOSUS, NOT INTRACTABLE: ICD-10-CM

## 2020-07-29 PROCEDURE — 99999 PR PBB SHADOW E&M-EST. PATIENT-LVL IV: CPT | Mod: PBBFAC,,, | Performed by: INTERNAL MEDICINE

## 2020-07-29 PROCEDURE — 90732 PPSV23 VACC 2 YRS+ SUBQ/IM: CPT | Mod: S$GLB,,, | Performed by: INTERNAL MEDICINE

## 2020-07-29 PROCEDURE — 99214 OFFICE O/P EST MOD 30 MIN: CPT | Mod: 25,S$GLB,, | Performed by: INTERNAL MEDICINE

## 2020-07-29 PROCEDURE — 99999 PR PBB SHADOW E&M-EST. PATIENT-LVL IV: ICD-10-PCS | Mod: PBBFAC,,, | Performed by: INTERNAL MEDICINE

## 2020-07-29 PROCEDURE — 99214 PR OFFICE/OUTPT VISIT, EST, LEVL IV, 30-39 MIN: ICD-10-PCS | Mod: 25,S$GLB,, | Performed by: INTERNAL MEDICINE

## 2020-07-29 PROCEDURE — 90471 PNEUMOCOCCAL POLYSACCHARIDE VACCINE 23-VALENT =>2YO SQ IM: ICD-10-PCS | Mod: S$GLB,,, | Performed by: INTERNAL MEDICINE

## 2020-07-29 PROCEDURE — 3008F BODY MASS INDEX DOCD: CPT | Mod: CPTII,S$GLB,, | Performed by: INTERNAL MEDICINE

## 2020-07-29 PROCEDURE — 90732 PNEUMOCOCCAL POLYSACCHARIDE VACCINE 23-VALENT =>2YO SQ IM: ICD-10-PCS | Mod: S$GLB,,, | Performed by: INTERNAL MEDICINE

## 2020-07-29 PROCEDURE — 90471 IMMUNIZATION ADMIN: CPT | Mod: S$GLB,,, | Performed by: INTERNAL MEDICINE

## 2020-07-29 PROCEDURE — 3008F PR BODY MASS INDEX (BMI) DOCUMENTED: ICD-10-PCS | Mod: CPTII,S$GLB,, | Performed by: INTERNAL MEDICINE

## 2020-07-29 NOTE — PROGRESS NOTES
[facility] Note    Chief Complaint      Chief Complaint   Patient presents with    Follow-up     6 months F/U     History of Present Illness      Ksenia Arce is a 44 y.o. female who presents today for chief complaint follow up chronic issues. Patient previously seen by me    PCP: Lourdes Felipe MD  Patient comes to appointment alone.     -Having some L sided chest wall pain worse with deep inspiration, from spine and radiates around to front, better with nsaids and msucle relaxer. No rcent cough or injury   -dry skin behind ears  Snoring more than previous, no witnessed apnea, does not wake fatigued an no daytime somnolence   Finished building house in [place], started a new job with [employer] and working from home      history of L sided breast cancer - saw warner in may had mmg and us in may 2020, seedonaldo steve next month, still on arimidex     No issues with migraines  Doing well on prozac with prn ativan   Active Problem List with Overview Notes    Diagnosis Date Noted    Rib pain on right side 07/29/2020     x2 weeks, partially relief with nsaids and mu relaxer  Worse with deep breath   Prior L sided breast cancer       Psoriasis 07/29/2020    Malignant neoplasm of left breast 01/23/2020     lobualr breast cancer of L breast dx in 5/2016 found on BSE, felt mass, had us and MMG was told was cyst but 2/2 pain sought eval by Dr surgeon Sr Lopez who did bx  -had mastectomy on L side and LN dissection , was in 6/9, chemo mastectomy radiation, taxol and doxirubicin, radiation x36, completed in 5/17, has been on arimidex since then, will beon for 10 total   -gets mmg and us q6 mos warner steve next month for DEXA   Onc Julia Lopez  Gyn Sy   Plastics [facility]   Had brca testing which was negative, er positive, her2 neg         Migraines 01/23/2020     Has been having for years, sees sal HA specialists at [facility], was on  botox and ppx triptan, is now om emgality which has greatly improved, still has break through takes rizatriptan and naratriptanand vicodin for breakthrough, plus zofran for associated nausea, no aura, hypersensitive to smells during episode       Generalized anxiety disorder 01/23/2020     On daily prozac since dx of breast cancer has lots of concerns of helath and has difficulty controlling worry, sees adam flores, uses prn ativan 2-3x a month which has decreased, feels mood is improved and anxiety controllable, no depression or SI       Herpes simplex 01/23/2020     Takes valcyte prn outbreak, oral lesions      History of total abdominal hysterectomy 01/23/2020     S/p with Sy          The 10-year ASCVD risk score (Marlo RICHTER Jr., et al., 2013) is: 0.6%    Values used to calculate the score:      Age: 44 years      Sex: Female      Is Non- : No      Diabetic: No      Tobacco smoker: No      Systolic Blood Pressure: 104 mmHg      Is BP treated: No      HDL Cholesterol: 69 mg/dL      Total Cholesterol: 262 mg/dL         Health Maintenance   Topic Date Due    Hepatitis C Screening  1975    Pneumococcal Vaccine (Highest Risk) (1 of 3 - PCV13) 10/17/1992    Mammogram  06/09/2022    Lipid Panel  02/05/2025    TETANUS VACCINE  06/01/2025       Past Medical History:   Diagnosis Date    Breast cancer 05/2016    H/O total hysterectomy     Migraine headache        Past Surgical History:   Procedure Laterality Date    BREAST CAPSULECTOMY      MASTECTOMY Left        family history includes Heart disease in her father.    Social History     Tobacco Use    Smoking status: Never Smoker    Smokeless tobacco: Never Used   Substance Use Topics    Alcohol use: Yes    Drug use: Not on file       Review of Systems   Constitutional: Negative for chills and fever.   HENT: Negative for congestion and sore throat.    Eyes: Negative for blurred vision and discharge.   Respiratory: Negative for  cough and shortness of breath.    Cardiovascular: Negative for chest pain and palpitations.   Gastrointestinal: Negative for constipation, diarrhea, nausea and vomiting.   Genitourinary: Negative for dysuria and hematuria.   Musculoskeletal: Negative for falls and myalgias.   Skin: Negative for itching and rash.   Neurological: Negative for dizziness and headaches.        Outpatient Encounter Medications as of 7/29/2020   Medication Sig Note Dispense Refill    anastrozole (ARIMIDEX) 1 mg Tab TK 1 T PO QD 1/10/2018: Received from: External Pharmacy  2    ascorbic acid, vitamin C, (VITAMIN C) 500 MG tablet Take 500 mg by mouth once daily.       cholecalciferol, vitamin D3, (VITAMIN D3) 1,000 unit Chew Take 1 tablet by mouth once daily.       EMGALITY  mg/mL PnIj Inject 1 mL into the skin every 30 days.       FLUoxetine 40 MG capsule Take 1 capsule by mouth once daily.       HYDROcodone-acetaminophen (NORCO) 7.5-325 mg per tablet Take 1 tablet by mouth daily as needed. 7/29/2020: Take as needed      LORazepam (ATIVAN) 0.5 MG tablet Take 1 tablet by mouth daily as needed. 7/29/2020: Take as needed      naratriptan (AMERGE) 2.5 MG tablet Take 1 tablet by mouth 2 (two) times daily as needed. 7/29/2020: Take as needed      ondansetron (ZOFRAN-ODT) 4 MG TbDL Take 1 tablet by mouth every 4 to 6 hours as needed. 7/29/2020: Take as needed      valACYclovir (VALTREX) 1000 MG tablet Take 1 tablet (1,000 mg total) by mouth 2 (two) times daily as needed. 7/29/2020: Take as needed 30 tablet 3    rizatriptan (MAXALT) 10 MG tablet Take 1 tablet by mouth 2 (two) times daily as needed.        No facility-administered encounter medications on file as of 7/29/2020.         Review of patient's allergies indicates:  No Known Allergies        Physical Exam      Vital Signs  Temp: 98.5 °F (36.9 °C)  Temp src: Oral  Pulse: 79  Resp: 16  SpO2: 98 %  BP: 104/80  BP Location: Right arm  Patient Position: Sitting  Pain Score:  "0-No pain  Height and Weight  Height: 5' 5" (165.1 cm)  Weight: 68.1 kg (150 lb 2.1 oz)  BSA (Calculated - sq m): 1.77 sq meters  BMI (Calculated): 25  Weight in (lb) to have BMI = 25: 149.9]    Physical Exam  Vitals signs reviewed.   Constitutional:       Appearance: She is well-developed.   HENT:      Head: Normocephalic and atraumatic.      Right Ear: External ear normal.      Left Ear: External ear normal.   Eyes:      General:         Right eye: No discharge.         Left eye: No discharge.   Neck:      Musculoskeletal: Normal range of motion.      Thyroid: No thyromegaly.   Cardiovascular:      Rate and Rhythm: Normal rate and regular rhythm.      Heart sounds: Normal heart sounds. No murmur.   Pulmonary:      Effort: Pulmonary effort is normal. No respiratory distress.      Breath sounds: Normal breath sounds.   Abdominal:      General: Bowel sounds are normal. There is no distension.      Palpations: Abdomen is soft.      Tenderness: There is no abdominal tenderness.   Musculoskeletal: Normal range of motion.         General: No deformity.   Skin:     General: Skin is warm and dry.      Findings: No rash.   Neurological:      Mental Status: She is alert and oriented to person, place, and time.   Psychiatric:         Behavior: Behavior normal.          Laboratory:  Labs 2.2020 reviewed   Radiology:  none    Assessment/Plan     Ksenia Arce is a 44 y.o.female with:    Rib pain on right side  Intercostal muscle spasm, pt declined cxr today but will let me know  Prn nsaids     Psoriasis  behing ear, per derm     Migraines  Continue current medications without change       Generalized anxiety disorder  Continue current medications without change       Malignant neoplasm of left breast  Per onc and breast surgery  Ct screening and arimidex  Pneumonia vaccine today       Orders Placed This Encounter   Procedures    (In Office Administered) Pneumococcal Polysaccharide Vaccine (23 Valent) (SQ/IM)    CBC auto " differential     Standing Status:   Future     Standing Expiration Date:   9/27/2021    Lipid Panel     Standing Status:   Future     Standing Expiration Date:   9/27/2021    Comprehensive metabolic panel     Standing Status:   Future     Standing Expiration Date:   9/27/2021    HIV 1/2 Ag/Ab (4th Gen)     Standing Status:   Future     Standing Expiration Date:   9/27/2021    Hepatitis C Antibody     Standing Status:   Future     Standing Expiration Date:   9/27/2021       Use of the [facility] Patient Portal discussed and encouraged during today's visit  -Continue current medications and maintain follow up with specialists.  Return to clinic in 6 for months for annual   Future Appointments   Date Time Provider Department Center   1/28/2021  8:00 AM [facility] MD  7/29/2020 8:10 AM    Primary Care Internal Medicine - [facility]

## 2020-09-25 RX ORDER — GALCANEZUMAB 120 MG/ML
1 INJECTION, SOLUTION SUBCUTANEOUS
Qty: 1 ML | Refills: 3 | Status: SHIPPED | OUTPATIENT
Start: 2020-09-25 | End: 2020-12-02 | Stop reason: SDUPTHER

## 2020-09-25 NOTE — TELEPHONE ENCOUNTER
Let pt know [facility]   Is that ok with her  Who has filled this rx in the past

## 2020-09-29 ENCOUNTER — TELEPHONE (OUTPATIENT)
Dept: PHARMACY | Facility: CLINIC | Age: 45
End: 2020-09-29

## 2020-12-02 ENCOUNTER — OFFICE VISIT (OUTPATIENT)
Dept: FAMILY MEDICINE | Facility: CLINIC | Age: 45
End: 2020-12-02
Payer: COMMERCIAL

## 2020-12-02 ENCOUNTER — PATIENT MESSAGE (OUTPATIENT)
Dept: FAMILY MEDICINE | Facility: CLINIC | Age: 45
End: 2020-12-02

## 2020-12-02 VITALS
TEMPERATURE: 97 F | BODY MASS INDEX: 25.66 KG/M2 | SYSTOLIC BLOOD PRESSURE: 118 MMHG | HEIGHT: 65 IN | OXYGEN SATURATION: 98 % | RESPIRATION RATE: 16 BRPM | WEIGHT: 154 LBS | HEART RATE: 78 BPM | DIASTOLIC BLOOD PRESSURE: 88 MMHG

## 2020-12-02 DIAGNOSIS — R07.81 RIB PAIN ON RIGHT SIDE: ICD-10-CM

## 2020-12-02 DIAGNOSIS — F41.1 GENERALIZED ANXIETY DISORDER: ICD-10-CM

## 2020-12-02 DIAGNOSIS — C50.212 MALIGNANT NEOPLASM OF UPPER-INNER QUADRANT OF LEFT FEMALE BREAST, UNSPECIFIED ESTROGEN RECEPTOR STATUS: ICD-10-CM

## 2020-12-02 DIAGNOSIS — G43.709 CHRONIC MIGRAINE WITHOUT AURA WITHOUT STATUS MIGRAINOSUS, NOT INTRACTABLE: Primary | ICD-10-CM

## 2020-12-02 PROCEDURE — 99214 PR OFFICE/OUTPT VISIT, EST, LEVL IV, 30-39 MIN: ICD-10-PCS | Mod: S$GLB,,, | Performed by: INTERNAL MEDICINE

## 2020-12-02 PROCEDURE — 99999 PR PBB SHADOW E&M-EST. PATIENT-LVL IV: CPT | Mod: PBBFAC,,, | Performed by: INTERNAL MEDICINE

## 2020-12-02 PROCEDURE — 99214 OFFICE O/P EST MOD 30 MIN: CPT | Mod: S$GLB,,, | Performed by: INTERNAL MEDICINE

## 2020-12-02 PROCEDURE — 99999 PR PBB SHADOW E&M-EST. PATIENT-LVL IV: ICD-10-PCS | Mod: PBBFAC,,, | Performed by: INTERNAL MEDICINE

## 2020-12-02 RX ORDER — GALCANEZUMAB 120 MG/ML
1 INJECTION, SOLUTION SUBCUTANEOUS
Qty: 1 ML | Refills: 3 | Status: SHIPPED | OUTPATIENT
Start: 2020-12-02 | End: 2020-12-28

## 2020-12-02 NOTE — PROGRESS NOTES
[facility] Note    Chief Complaint      Chief Complaint   Patient presents with    Migraine     History of Present Illness      Ksenia Arce is a 45 y.o. female who presents today for chief complaint migraines. Patient comes to appointment alone.     HPI   Had annual exam in Jan 2020, due for follow up annual next month  Failed topomax, elavil, verapimil, maxalt, botox,   Active Problem List with Overview Notes    Diagnosis Date Noted    Rib pain on right side 07/29/2020    Psoriasis 07/29/2020    Malignant neoplasm of left breast 01/23/2020     lobualr breast cancer of L breast dx in 5/2016 found on BSE, felt mass, had us and MMG was told was cyst but 2/2 pain sought eval by Dr surgeon Sr Lopez who did bx  -had mastectomy on L side and LN dissection , was in 6/9, chemo mastectomy radiation, taxol and doxirubicin, radiation x36, completed in 5/17, has been on arimidex since then, will beon for 10 total   -gets mmg and us q6 mos warner  Onc Julia Armstrong - she follows BELINDA Lopez  Gyn [facility]   Plastics [facility]   Had brca testing which was negative, er positive, her2 neg         Migraines 01/23/2020     Has been having for years, seedonaldo huang HA specialists at [facility], was on botox and ppx triptan, is now on emgality and takes rizatriptan and naratriptan and vicodin for breakthrough, plus zofran for associated nausea, no aura, hypersensitive to smells during episode  emgality has been working   Failed topomax, elavil, verapimil, maxalt, botox,        Generalized anxiety disorder 01/23/2020     On daily prozac, sees adam flores, uses prn ativan 2-3x a month       Herpes simplex 01/23/2020     Takes valcyte prn outbreak, oral lesions      History of total abdominal hysterectomy 01/23/2020     S/p with Sy          Health Maintenance   Topic Date Due    Hepatitis C Screening  1975    Pneumococcal Vaccine (Highest Risk) (2 of 3 - PCV13) 07/29/2021     Mammogram  06/09/2022    Lipid Panel  02/05/2025    TETANUS VACCINE  06/01/2025       Past Medical History:   Diagnosis Date    Breast cancer 05/2016    H/O total hysterectomy     Migraine headache        Past Surgical History:   Procedure Laterality Date    BREAST CAPSULECTOMY      MASTECTOMY Left        family history includes Heart disease in her father.    Social History     Tobacco Use    Smoking status: Never Smoker    Smokeless tobacco: Never Used   Substance Use Topics    Alcohol use: Yes    Drug use: Not on file       Review of Systems   Constitutional: Negative for chills, fever, malaise/fatigue and weight loss.   Respiratory: Negative for cough, sputum production, shortness of breath and wheezing.    Cardiovascular: Negative for chest pain, palpitations, orthopnea and leg swelling.   Gastrointestinal: Negative for constipation, diarrhea, nausea and vomiting.   Genitourinary: Negative for dysuria, frequency, hematuria and urgency.   Neurological: Positive for headaches.        Outpatient Encounter Medications as of 12/2/2020   Medication Sig Note Dispense Refill    anastrozole (ARIMIDEX) 1 mg Tab TK 1 T PO QD 1/10/2018: Received from: External Pharmacy  2    FLUoxetine 40 MG capsule Take 1 capsule by mouth once daily.       HYDROcodone-acetaminophen (NORCO) 7.5-325 mg per tablet Take 1 tablet by mouth daily as needed. 7/29/2020: Take as needed      LORazepam (ATIVAN) 0.5 MG tablet Take 1 tablet by mouth daily as needed. 7/29/2020: Take as needed      naratriptan (AMERGE) 2.5 MG tablet Take 1 tablet by mouth 2 (two) times daily as needed. 7/29/2020: Take as needed      ondansetron (ZOFRAN-ODT) 4 MG TbDL Take 1 tablet by mouth every 4 to 6 hours as needed. 7/29/2020: Take as needed      rizatriptan (MAXALT) 10 MG tablet Take 1 tablet by mouth 2 (two) times daily as needed.       valACYclovir (VALTREX) 1000 MG tablet Take 1 tablet (1,000 mg total) by mouth 2 (two) times daily as needed.  "7/29/2020: Take as needed 30 tablet 3    ascorbic acid, vitamin C, (VITAMIN C) 500 MG tablet Take 500 mg by mouth once daily.       cholecalciferol, vitamin D3, (VITAMIN D3) 1,000 unit Chew Take 1 tablet by mouth once daily.       EMGALITY  mg/mL PnIj Inject 1 mL into the skin every 30 days.  1 mL 3    [DISCONTINUED] EMGALITY  mg/mL PnIj Inject 1 mL into the skin every 30 days. (Patient not taking: Reported on 12/2/2020)  1 mL 3     No facility-administered encounter medications on file as of 12/2/2020.         Review of patient's allergies indicates:  No Known Allergies        Physical Exam      Vital Signs  Temp: 97.3 °F (36.3 °C)  Temp src: Temporal  Pulse: 78  Resp: 16  SpO2: 98 %  BP: 118/88  BP Location: Right arm  Patient Position: Sitting  Height and Weight  Height: 5' 5" (165.1 cm)  Weight: 69.8 kg (153 lb 15.9 oz)  BSA (Calculated - sq m): 1.79 sq meters  BMI (Calculated): 25.6  Weight in (lb) to have BMI = 25: 149.9]    Physical Exam  Vitals signs reviewed.   Constitutional:       General: She is not in acute distress.     Appearance: She is well-developed. She is not diaphoretic.   HENT:      Head: Normocephalic and atraumatic.      Right Ear: External ear normal.      Left Ear: External ear normal.      Nose: Nose normal.   Eyes:      General:         Right eye: No discharge.         Left eye: No discharge.      Conjunctiva/sclera: Conjunctivae normal.   Neck:      Musculoskeletal: Normal range of motion.   Pulmonary:      Effort: Pulmonary effort is normal. No respiratory distress.   Musculoskeletal: Normal range of motion.   Skin:     Coloration: Skin is not pale.      Findings: No rash.   Neurological:      Mental Status: She is alert and oriented to person, place, and time.   Psychiatric:         Behavior: Behavior normal.         Thought Content: Thought content normal.         Judgment: Judgment normal.          Laboratory:  Labs Jan 2020 reviewed CBC. CMP, FLP, TSH  ASCVD " 1.7%    Assessment/Plan     Ksenia Arce is a 45 y.o.female with:    Migraines  Will try and send medicine again and get in touch with Dr Novoa       Orders Placed This Encounter   Procedures    Ambulatory referral/consult to Neurology     Standing Status:   Future     Standing Expiration Date:   1/2/2022     Referral Priority:   Urgent     Referral Type:   Consultation     Referral Reason:   Specialty Services Required     Requested Specialty:   Neurology     Number of Visits Requested:   1       Use of the [facility] Patient Portal discussed and encouraged during today's visit  -Continue current medications and maintain follow up with specialists.  Return to clinic in as scheduled months.  Future Appointments   Date Time Provider Department Center   1/28/2021  8:00 AM Lourdes Felipe MD [facility]       Lourdes Felipe MD  12/2/2020 8:07 AM    Primary Care Internal Medicine - [facility]

## 2020-12-05 ENCOUNTER — PATIENT MESSAGE (OUTPATIENT)
Dept: FAMILY MEDICINE | Facility: CLINIC | Age: 45
End: 2020-12-05

## 2020-12-06 ENCOUNTER — PATIENT MESSAGE (OUTPATIENT)
Dept: FAMILY MEDICINE | Facility: CLINIC | Age: 45
End: 2020-12-06

## 2020-12-07 ENCOUNTER — SPECIALTY PHARMACY (OUTPATIENT)
Dept: PHARMACY | Facility: CLINIC | Age: 45
End: 2020-12-07

## 2020-12-08 ENCOUNTER — PATIENT MESSAGE (OUTPATIENT)
Dept: NEUROLOGY | Facility: CLINIC | Age: 45
End: 2020-12-08

## 2020-12-08 ENCOUNTER — OFFICE VISIT (OUTPATIENT)
Dept: NEUROLOGY | Facility: CLINIC | Age: 45
End: 2020-12-08
Payer: COMMERCIAL

## 2020-12-08 VITALS
BODY MASS INDEX: 25.49 KG/M2 | WEIGHT: 153 LBS | HEIGHT: 65 IN | DIASTOLIC BLOOD PRESSURE: 92 MMHG | HEART RATE: 80 BPM | SYSTOLIC BLOOD PRESSURE: 129 MMHG

## 2020-12-08 DIAGNOSIS — M79.10 MYALGIA: Primary | ICD-10-CM

## 2020-12-08 DIAGNOSIS — G43.019 INTRACTABLE MIGRAINE WITHOUT AURA AND WITHOUT STATUS MIGRAINOSUS: ICD-10-CM

## 2020-12-08 DIAGNOSIS — G89.28 POST-MASTECTOMY PAIN SYNDROME: ICD-10-CM

## 2020-12-08 PROCEDURE — 99204 PR OFFICE/OUTPT VISIT, NEW, LEVL IV, 45-59 MIN: ICD-10-PCS | Mod: S$GLB,,, | Performed by: PSYCHIATRY & NEUROLOGY

## 2020-12-08 PROCEDURE — 99204 OFFICE O/P NEW MOD 45 MIN: CPT | Mod: S$GLB,,, | Performed by: PSYCHIATRY & NEUROLOGY

## 2020-12-08 PROCEDURE — 99999 PR PBB SHADOW E&M-EST. PATIENT-LVL IV: ICD-10-PCS | Mod: PBBFAC,,, | Performed by: PSYCHIATRY & NEUROLOGY

## 2020-12-08 PROCEDURE — 99999 PR PBB SHADOW E&M-EST. PATIENT-LVL IV: CPT | Mod: PBBFAC,,, | Performed by: PSYCHIATRY & NEUROLOGY

## 2020-12-08 RX ORDER — RIZATRIPTAN BENZOATE 10 MG/1
10 TABLET ORAL 3 TIMES DAILY PRN
Qty: 12 TABLET | Refills: 2 | Status: SHIPPED | OUTPATIENT
Start: 2020-12-08 | End: 2021-02-23 | Stop reason: SDUPTHER

## 2020-12-08 RX ORDER — ONDANSETRON 4 MG/1
4 TABLET, ORALLY DISINTEGRATING ORAL EVERY 8 HOURS PRN
Qty: 21 TABLET | Refills: 2 | Status: SHIPPED | OUTPATIENT
Start: 2020-12-08 | End: 2021-01-07

## 2020-12-08 RX ORDER — GABAPENTIN 300 MG/1
300 CAPSULE ORAL EVERY 8 HOURS
Qty: 90 CAPSULE | Refills: 2 | Status: SHIPPED | OUTPATIENT
Start: 2020-12-08 | End: 2021-02-23 | Stop reason: SDUPTHER

## 2020-12-08 RX ORDER — KETOROLAC TROMETHAMINE 15.75 MG/1
15.75 SPRAY, METERED NASAL EVERY 6 HOURS PRN
Qty: 5 EACH | Refills: 0 | Status: SHIPPED | OUTPATIENT
Start: 2020-12-08 | End: 2021-01-07

## 2020-12-08 NOTE — PROGRESS NOTES
PATIENT ID     12/8/2020    Ksenia Arce is a 45 y.o. female.    REASON FOR VISIT / CHIEF COMPLAINT     Migraine  Ms Arce is a 45 yr old WF with breast cancer, migraines and GABBIE here for FU of migraines.Last saw Dr Boles for same one year ago. Began about 20 years ago. Developed chronic migraines ( w/o aura), failing multiple meds ( TPM, verapamil, botox), but then had great control with Emgality which she took for nearly 2 years until her new insurance co. denied it.Had 1 h/a q month to one q 2 months, and now having them 5/month, lasting 2 days, ie 10 h/a days a month, since not on emgality.     Headaches 7/10, with photo and phonophobia, N/V, no aura and no syncope. Cool cloth and pinching Lt eyebrow helps. Pain is in center of entire head.  No ER visits. IM torodol has helped.  Current abortive are maxalt, naratriptan and zofran. Low dose vicodin in past, did not need since     Also has myalgias ( on Arimidex), and left anterolateral chest wall pain since mastectomy, which was complicated by argelia for further surgeries; has extensive scarring and continued daily pain.   Thighs ache daily as well. No dx of FMA in past.     Physical Exam  Constitutional:       General: She is not in acute distress.     Appearance: Normal appearance. She is not ill-appearing.   Eyes:      Extraocular Movements: Extraocular movements intact.      Pupils: Pupils are equal, round, and reactive to light.      Comments: No disc edema on fundoscopic exam   Pulmonary:      Effort: No respiratory distress.   Musculoskeletal:      Right lower leg: No edema.      Left lower leg: No edema.   Neurological:      General: No focal deficit present.      Mental Status: She is alert and oriented to person, place, and time.      Cranial Nerves: No cranial nerve deficit.      Motor: No weakness.      Coordination: Coordination normal.      Gait: Gait normal.      Deep Tendon Reflexes: Reflexes abnormal (2/2 BUE, absent BLE; no ankle clonus).    Psychiatric:         Mood and Affect: Mood normal.         Thought Content: Thought content normal.         Judgment: Judgment normal.         NEUROLOGICAL EXAMINATION:     MENTAL STATUS   Oriented to person, place, and time.   Attention: normal.   Knowledge: good.     CRANIAL NERVES     CN III, IV, VI   Pupils are equal, round, and reactive to light.         MEDICATIONS     Current Outpatient Medications   Medication Sig Dispense Refill    anastrozole (ARIMIDEX) 1 mg Tab TK 1 T PO QD  2    ascorbic acid, vitamin C, (VITAMIN C) 500 MG tablet Take 500 mg by mouth once daily.      cholecalciferol, vitamin D3, (VITAMIN D3) 1,000 unit Chew Take 1 tablet by mouth once daily.      EMGALITY  mg/mL PnIj Inject 1 mL into the skin every 30 days. 1 mL 3    flu vac ts 2016-17,4 yr up,/PF (FLUVIRIN 2120-5525, PF, IM) Fluvirin 7656-4856 45 mcg (15 mcg x 3)/0.5 mL intramuscular suspension   ADM 0.5ML IM UTD      FLUoxetine 40 MG capsule Take 1 capsule by mouth once daily.      HYDROcodone-acetaminophen (NORCO) 7.5-325 mg per tablet Take 1 tablet by mouth daily as needed.      LORazepam (ATIVAN) 0.5 MG tablet Take 1 tablet by mouth daily as needed.      naratriptan (AMERGE) 2.5 MG tablet Take 1 tablet by mouth 2 (two) times daily as needed.      ondansetron (ZOFRAN-ODT) 4 MG TbDL Take 1 tablet by mouth every 4 to 6 hours as needed.      rizatriptan (MAXALT) 10 MG tablet Take 1 tablet by mouth 2 (two) times daily as needed.      valACYclovir (VALTREX) 1000 MG tablet Take 1 tablet (1,000 mg total) by mouth 2 (two) times daily as needed. 30 tablet 3     No current facility-administered medications for this visit.            LABORATORY / DIAGNOSTIC RESULTS         SCORE TESTS               REVIEW OF SYSTEMS     Review of Systems   Constitutional: Negative for fever, malaise/fatigue and weight loss.   HENT: Negative for hearing loss and tinnitus.    Eyes: Positive for double vision and photophobia. Negative for  blurred vision.   Respiratory: Negative for cough.    Cardiovascular: Positive for chest pain (chest wall pain since mastectomny). Negative for palpitations.   Gastrointestinal: Negative for constipation, heartburn and nausea.   Musculoskeletal: Positive for falls and myalgias.   Neurological: Positive for headaches. Negative for dizziness, tingling, tremors, speech change, focal weakness, seizures and loss of consciousness.   Psychiatric/Behavioral: Negative for depression and memory loss. The patient is nervous/anxious (controlled with help of psychiatry). The patient does not have insomnia.        EXAMINATIONS            MIGRAINE-SPECIFIC QUALITY OF LIFE MEASURES    Migraine Specific Quality of Life Questionnaire    No data filed          The Migraine Disability Assessment Test (MIDAS)    No data filed          IMPRESSION / PLAN        Increased frequency of migraine since no longer has Emgality, approaching chronic fication again. Pr will find out if another CGRP AB is covered and let me know.   Add toradol NS PRN, and Nurtec prn. Gave 2 samples dosages. OK to mix Nurtec with triptan if H/A severe.     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  Plan trial of topical lidocaine    History of chemo induced PN, now asymptomatic    Myalgias, possibly due to Arimidex             RECOMMENDATIONS  Addressed above.    Shadia Valdivia MD

## 2020-12-08 NOTE — LETTER
December 8, 2020      Lourdes Felipe MD  [address] [facility] [address]           [facility]  [id number]  [facility] [address]          Patient: Ksenia Arce   MR Number: [id number]   YOB: 1975   Date of Visit: 12/8/2020       Dear Dr. Lourdes Felipe:    Thank you for referring Ksenia Arce to me for evaluation. Attached you will find relevant portions of my assessment and plan of care.    If you have questions, please do not hesitate to call me. I look forward to following Ksenia Arce along with you.    Sincerely,    Shadia Valdivia MD    Enclosure  CC:  No Recipients    If you would like to receive this communication electronically, please contact [email or web address] or [phone] to request more information on [employer] Link access.    For providers and/or their staff who would like to refer a patient to [facility], please contact us through our one-stop-shop provider referral line, [facility], at [phone].    If you feel you have received this communication in error or would no longer like to receive these types of communications, please e-mail [email or web address]

## 2020-12-28 RX ORDER — RIMEGEPANT SULFATE 75 MG/75MG
75 TABLET, ORALLY DISINTEGRATING ORAL ONCE AS NEEDED
Qty: 8 TABLET | Refills: 1 | Status: SHIPPED | OUTPATIENT
Start: 2020-12-28 | End: 2020-12-28

## 2020-12-28 NOTE — TELEPHONE ENCOUNTER
Patient stated that the Nurtec did help but she only had a sample of it. Patient wants to know if you can send in a script for the Nurtec.     (via patient msg 12/08/2020)   Aimovig needs pa and will not be covered unless I have tried other meds. Emgamity is not covered and does not offer alternative.

## 2020-12-28 NOTE — TELEPHONE ENCOUNTER
Informed patient the script has been sent to [facility] for the NURTEC and AIMOVIG, per Dr Valdivia, and both may require prior auth.  Advised the patient to f/u with clinic if she has NOT heard from the pharm by weeks end. Patient verbalized understanding.

## 2020-12-29 RX ORDER — RIMEGEPANT SULFATE 75 MG/75MG
TABLET, ORALLY DISINTEGRATING ORAL
Qty: 8 TABLET | Refills: 1 | Status: SHIPPED | OUTPATIENT
Start: 2020-12-29 | End: 2021-02-23 | Stop reason: SDUPTHER

## 2021-01-04 ENCOUNTER — PATIENT MESSAGE (OUTPATIENT)
Dept: NEUROLOGY | Facility: CLINIC | Age: 46
End: 2021-01-04

## 2021-01-20 ENCOUNTER — PATIENT MESSAGE (OUTPATIENT)
Dept: NEUROLOGY | Facility: CLINIC | Age: 46
End: 2021-01-20

## 2021-01-28 ENCOUNTER — OFFICE VISIT (OUTPATIENT)
Dept: FAMILY MEDICINE | Facility: CLINIC | Age: 46
End: 2021-01-28
Payer: COMMERCIAL

## 2021-01-28 VITALS
BODY MASS INDEX: 26.3 KG/M2 | OXYGEN SATURATION: 98 % | TEMPERATURE: 98 F | DIASTOLIC BLOOD PRESSURE: 68 MMHG | WEIGHT: 157.88 LBS | RESPIRATION RATE: 16 BRPM | HEART RATE: 81 BPM | SYSTOLIC BLOOD PRESSURE: 108 MMHG | HEIGHT: 65 IN

## 2021-01-28 DIAGNOSIS — C50.212 MALIGNANT NEOPLASM OF UPPER-INNER QUADRANT OF LEFT FEMALE BREAST, UNSPECIFIED ESTROGEN RECEPTOR STATUS: ICD-10-CM

## 2021-01-28 DIAGNOSIS — Z00.00 WELLNESS EXAMINATION: Primary | ICD-10-CM

## 2021-01-28 DIAGNOSIS — Z71.89 ADVICE GIVEN ABOUT COVID-19 VIRUS INFECTION: ICD-10-CM

## 2021-01-28 DIAGNOSIS — G43.019 INTRACTABLE MIGRAINE WITHOUT AURA AND WITHOUT STATUS MIGRAINOSUS: ICD-10-CM

## 2021-01-28 DIAGNOSIS — F41.1 GENERALIZED ANXIETY DISORDER: ICD-10-CM

## 2021-01-28 PROBLEM — R07.81 RIB PAIN ON RIGHT SIDE: Status: RESOLVED | Noted: 2020-07-29 | Resolved: 2021-01-28

## 2021-01-28 PROCEDURE — 99396 PR PREVENTIVE VISIT,EST,40-64: ICD-10-PCS | Mod: S$GLB,,, | Performed by: INTERNAL MEDICINE

## 2021-01-28 PROCEDURE — 99396 PREV VISIT EST AGE 40-64: CPT | Mod: S$GLB,,, | Performed by: INTERNAL MEDICINE

## 2021-01-28 PROCEDURE — 99999 PR PBB SHADOW E&M-EST. PATIENT-LVL IV: ICD-10-PCS | Mod: PBBFAC,,, | Performed by: INTERNAL MEDICINE

## 2021-01-28 PROCEDURE — 99999 PR PBB SHADOW E&M-EST. PATIENT-LVL IV: CPT | Mod: PBBFAC,,, | Performed by: INTERNAL MEDICINE

## 2021-01-28 RX ORDER — ALBUTEROL SULFATE 90 UG/1
2 AEROSOL, METERED RESPIRATORY (INHALATION) EVERY 6 HOURS PRN
Qty: 18 G | Refills: 0 | Status: SHIPPED | OUTPATIENT
Start: 2021-01-28 | End: 2024-03-06

## 2021-02-21 ENCOUNTER — PATIENT MESSAGE (OUTPATIENT)
Dept: FAMILY MEDICINE | Facility: CLINIC | Age: 46
End: 2021-02-21

## 2021-02-23 ENCOUNTER — OFFICE VISIT (OUTPATIENT)
Dept: NEUROLOGY | Facility: CLINIC | Age: 46
End: 2021-02-23
Payer: COMMERCIAL

## 2021-02-23 VITALS
BODY MASS INDEX: 26.31 KG/M2 | SYSTOLIC BLOOD PRESSURE: 131 MMHG | DIASTOLIC BLOOD PRESSURE: 88 MMHG | HEIGHT: 65 IN | WEIGHT: 157.94 LBS | HEART RATE: 77 BPM

## 2021-02-23 DIAGNOSIS — G62.0 DRUG-INDUCED POLYNEUROPATHY: ICD-10-CM

## 2021-02-23 DIAGNOSIS — G43.019 INTRACTABLE MIGRAINE WITHOUT AURA AND WITHOUT STATUS MIGRAINOSUS: Primary | ICD-10-CM

## 2021-02-23 DIAGNOSIS — G89.28 POST-MASTECTOMY PAIN SYNDROME: ICD-10-CM

## 2021-02-23 PROCEDURE — 99214 OFFICE O/P EST MOD 30 MIN: CPT | Mod: S$GLB,,, | Performed by: PSYCHIATRY & NEUROLOGY

## 2021-02-23 PROCEDURE — 99214 PR OFFICE/OUTPT VISIT, EST, LEVL IV, 30-39 MIN: ICD-10-PCS | Mod: S$GLB,,, | Performed by: PSYCHIATRY & NEUROLOGY

## 2021-02-23 PROCEDURE — 99999 PR PBB SHADOW E&M-EST. PATIENT-LVL III: ICD-10-PCS | Mod: PBBFAC,,, | Performed by: PSYCHIATRY & NEUROLOGY

## 2021-02-23 PROCEDURE — 99999 PR PBB SHADOW E&M-EST. PATIENT-LVL III: CPT | Mod: PBBFAC,,, | Performed by: PSYCHIATRY & NEUROLOGY

## 2021-02-23 RX ORDER — GALCANEZUMAB 120 MG/ML
INJECTION, SOLUTION SUBCUTANEOUS
Qty: 2 SYRINGE | Refills: 0 | Status: SHIPPED | OUTPATIENT
Start: 2021-02-23 | End: 2021-03-22

## 2021-02-23 RX ORDER — RIMEGEPANT SULFATE 75 MG/75MG
TABLET, ORALLY DISINTEGRATING ORAL
Qty: 8 TABLET | Refills: 3 | Status: SHIPPED | OUTPATIENT
Start: 2021-02-23 | End: 2021-06-24

## 2021-02-23 RX ORDER — GABAPENTIN 300 MG/1
300 CAPSULE ORAL 3 TIMES DAILY
Qty: 270 CAPSULE | Refills: 1 | Status: SHIPPED | OUTPATIENT
Start: 2021-02-23 | End: 2021-06-24

## 2021-02-23 RX ORDER — RIZATRIPTAN BENZOATE 10 MG/1
10 TABLET ORAL 3 TIMES DAILY PRN
Qty: 12 TABLET | Refills: 0 | Status: SHIPPED | OUTPATIENT
Start: 2021-02-23 | End: 2021-06-24 | Stop reason: SDUPTHER

## 2021-02-24 ENCOUNTER — PATIENT MESSAGE (OUTPATIENT)
Dept: FAMILY MEDICINE | Facility: CLINIC | Age: 46
End: 2021-02-24

## 2021-02-24 RX ORDER — METHOCARBAMOL 500 MG/1
500 TABLET, FILM COATED ORAL 4 TIMES DAILY
Qty: 40 TABLET | Refills: 0 | Status: SHIPPED | OUTPATIENT
Start: 2021-02-24 | End: 2021-03-06

## 2021-03-24 ENCOUNTER — PATIENT MESSAGE (OUTPATIENT)
Dept: ORTHOPEDICS | Facility: CLINIC | Age: 46
End: 2021-03-24

## 2021-05-03 PROBLEM — Z00.00 WELLNESS EXAMINATION: Status: RESOLVED | Noted: 2020-01-23 | Resolved: 2021-05-03

## 2021-06-24 ENCOUNTER — OFFICE VISIT (OUTPATIENT)
Dept: NEUROLOGY | Facility: CLINIC | Age: 46
End: 2021-06-24
Payer: COMMERCIAL

## 2021-06-24 VITALS
SYSTOLIC BLOOD PRESSURE: 118 MMHG | DIASTOLIC BLOOD PRESSURE: 84 MMHG | BODY MASS INDEX: 25.88 KG/M2 | HEIGHT: 65 IN | HEART RATE: 73 BPM | WEIGHT: 155.31 LBS

## 2021-06-24 DIAGNOSIS — G43.009 MIGRAINE WITHOUT AURA AND WITHOUT STATUS MIGRAINOSUS, NOT INTRACTABLE: Primary | ICD-10-CM

## 2021-06-24 DIAGNOSIS — G89.28 POST-MASTECTOMY PAIN SYNDROME: ICD-10-CM

## 2021-06-24 PROCEDURE — 99999 PR PBB SHADOW E&M-EST. PATIENT-LVL III: ICD-10-PCS | Mod: PBBFAC,,, | Performed by: PSYCHIATRY & NEUROLOGY

## 2021-06-24 PROCEDURE — 99214 PR OFFICE/OUTPT VISIT, EST, LEVL IV, 30-39 MIN: ICD-10-PCS | Mod: S$GLB,,, | Performed by: PSYCHIATRY & NEUROLOGY

## 2021-06-24 PROCEDURE — 99999 PR PBB SHADOW E&M-EST. PATIENT-LVL III: CPT | Mod: PBBFAC,,, | Performed by: PSYCHIATRY & NEUROLOGY

## 2021-06-24 PROCEDURE — 99214 OFFICE O/P EST MOD 30 MIN: CPT | Mod: S$GLB,,, | Performed by: PSYCHIATRY & NEUROLOGY

## 2021-06-24 RX ORDER — GABAPENTIN 300 MG/1
300 CAPSULE ORAL 3 TIMES DAILY
Qty: 270 CAPSULE | Refills: 1 | Status: SHIPPED | OUTPATIENT
Start: 2021-06-24 | End: 2021-12-28 | Stop reason: SDUPTHER

## 2021-06-24 RX ORDER — RIZATRIPTAN BENZOATE 10 MG/1
TABLET ORAL
Qty: 36 TABLET | Refills: 1 | Status: SHIPPED | OUTPATIENT
Start: 2021-06-24 | End: 2021-11-11

## 2021-06-24 RX ORDER — GALCANEZUMAB 120 MG/ML
INJECTION, SOLUTION SUBCUTANEOUS
Qty: 1 SYRINGE | Refills: 5 | Status: SHIPPED | OUTPATIENT
Start: 2021-06-24 | End: 2021-12-28 | Stop reason: SDUPTHER

## 2021-06-28 ENCOUNTER — PATIENT MESSAGE (OUTPATIENT)
Dept: FAMILY MEDICINE | Facility: CLINIC | Age: 46
End: 2021-06-28

## 2021-07-14 ENCOUNTER — TELEPHONE (OUTPATIENT)
Dept: ADMINISTRATIVE | Facility: HOSPITAL | Age: 46
End: 2021-07-14

## 2021-07-14 ENCOUNTER — PATIENT OUTREACH (OUTPATIENT)
Dept: ADMINISTRATIVE | Facility: HOSPITAL | Age: 46
End: 2021-07-14

## 2021-07-28 ENCOUNTER — OFFICE VISIT (OUTPATIENT)
Dept: FAMILY MEDICINE | Facility: CLINIC | Age: 46
End: 2021-07-28
Payer: COMMERCIAL

## 2021-07-28 VITALS
SYSTOLIC BLOOD PRESSURE: 110 MMHG | BODY MASS INDEX: 26.24 KG/M2 | HEIGHT: 65 IN | OXYGEN SATURATION: 99 % | DIASTOLIC BLOOD PRESSURE: 82 MMHG | WEIGHT: 157.5 LBS | TEMPERATURE: 98 F | HEART RATE: 81 BPM

## 2021-07-28 DIAGNOSIS — C50.212 MALIGNANT NEOPLASM OF UPPER-INNER QUADRANT OF LEFT FEMALE BREAST, UNSPECIFIED ESTROGEN RECEPTOR STATUS: ICD-10-CM

## 2021-07-28 DIAGNOSIS — Z00.00 WELLNESS EXAMINATION: ICD-10-CM

## 2021-07-28 DIAGNOSIS — R10.9 ABDOMINAL PAIN, UNSPECIFIED ABDOMINAL LOCATION: Primary | ICD-10-CM

## 2021-07-28 DIAGNOSIS — A09 TRAVELER'S DIARRHEA: ICD-10-CM

## 2021-07-28 DIAGNOSIS — G43.009 MIGRAINE WITHOUT AURA AND WITHOUT STATUS MIGRAINOSUS, NOT INTRACTABLE: ICD-10-CM

## 2021-07-28 DIAGNOSIS — F41.1 GENERALIZED ANXIETY DISORDER: ICD-10-CM

## 2021-07-28 PROCEDURE — 99999 PR PBB SHADOW E&M-EST. PATIENT-LVL IV: CPT | Mod: PBBFAC,,, | Performed by: INTERNAL MEDICINE

## 2021-07-28 PROCEDURE — 99999 PR PBB SHADOW E&M-EST. PATIENT-LVL IV: ICD-10-PCS | Mod: PBBFAC,,, | Performed by: INTERNAL MEDICINE

## 2021-07-28 PROCEDURE — 99214 OFFICE O/P EST MOD 30 MIN: CPT | Mod: S$GLB,,, | Performed by: INTERNAL MEDICINE

## 2021-07-28 PROCEDURE — 99214 PR OFFICE/OUTPT VISIT, EST, LEVL IV, 30-39 MIN: ICD-10-PCS | Mod: S$GLB,,, | Performed by: INTERNAL MEDICINE

## 2021-07-28 RX ORDER — CIPROFLOXACIN 500 MG/1
500 TABLET ORAL EVERY 12 HOURS
Qty: 6 TABLET | Refills: 0 | Status: SHIPPED | OUTPATIENT
Start: 2021-07-28 | End: 2021-08-17

## 2021-08-02 ENCOUNTER — PATIENT MESSAGE (OUTPATIENT)
Dept: FAMILY MEDICINE | Facility: CLINIC | Age: 46
End: 2021-08-02

## 2021-08-02 PROBLEM — Z00.00 WELLNESS EXAMINATION: Status: RESOLVED | Noted: 2021-08-02 | Resolved: 2021-08-02

## 2021-08-02 PROBLEM — A09 TRAVELER'S DIARRHEA: Status: ACTIVE | Noted: 2021-08-02

## 2021-08-02 PROBLEM — Z00.00 WELLNESS EXAMINATION: Status: ACTIVE | Noted: 2021-08-02

## 2021-08-02 RX ORDER — ATORVASTATIN CALCIUM 20 MG/1
20 TABLET, FILM COATED ORAL DAILY
Qty: 90 TABLET | Refills: 1 | Status: SHIPPED | OUTPATIENT
Start: 2021-08-02 | End: 2022-01-28 | Stop reason: SDUPTHER

## 2021-08-15 ENCOUNTER — PATIENT MESSAGE (OUTPATIENT)
Dept: FAMILY MEDICINE | Facility: CLINIC | Age: 46
End: 2021-08-15

## 2021-08-15 DIAGNOSIS — R19.7 DIARRHEA, UNSPECIFIED TYPE: Primary | ICD-10-CM

## 2021-08-16 ENCOUNTER — PATIENT OUTREACH (OUTPATIENT)
Dept: ADMINISTRATIVE | Facility: OTHER | Age: 46
End: 2021-08-16

## 2021-08-17 ENCOUNTER — PATIENT MESSAGE (OUTPATIENT)
Dept: GASTROENTEROLOGY | Facility: CLINIC | Age: 46
End: 2021-08-17

## 2021-08-17 ENCOUNTER — OFFICE VISIT (OUTPATIENT)
Dept: GASTROENTEROLOGY | Facility: CLINIC | Age: 46
End: 2021-08-17
Payer: COMMERCIAL

## 2021-08-17 ENCOUNTER — LAB VISIT (OUTPATIENT)
Dept: LAB | Facility: HOSPITAL | Age: 46
End: 2021-08-17
Attending: NURSE PRACTITIONER
Payer: COMMERCIAL

## 2021-08-17 VITALS — BODY MASS INDEX: 25.38 KG/M2 | HEIGHT: 65 IN | WEIGHT: 152.31 LBS

## 2021-08-17 DIAGNOSIS — Z12.11 SCREENING FOR COLON CANCER: Primary | ICD-10-CM

## 2021-08-17 DIAGNOSIS — R19.7 DIARRHEA, UNSPECIFIED TYPE: ICD-10-CM

## 2021-08-17 DIAGNOSIS — R11.2 NAUSEA AND VOMITING, INTRACTABILITY OF VOMITING NOT SPECIFIED, UNSPECIFIED VOMITING TYPE: ICD-10-CM

## 2021-08-17 LAB
IGA SERPL-MCNC: 138 MG/DL (ref 40–350)
TSH SERPL DL<=0.005 MIU/L-ACNC: 1.77 UIU/ML (ref 0.4–4)

## 2021-08-17 PROCEDURE — 99204 OFFICE O/P NEW MOD 45 MIN: CPT | Mod: S$GLB,,, | Performed by: NURSE PRACTITIONER

## 2021-08-17 PROCEDURE — 82784 ASSAY IGA/IGD/IGG/IGM EACH: CPT | Performed by: NURSE PRACTITIONER

## 2021-08-17 PROCEDURE — 99999 PR PBB SHADOW E&M-EST. PATIENT-LVL IV: CPT | Mod: PBBFAC,,, | Performed by: NURSE PRACTITIONER

## 2021-08-17 PROCEDURE — 99999 PR PBB SHADOW E&M-EST. PATIENT-LVL IV: ICD-10-PCS | Mod: PBBFAC,,, | Performed by: NURSE PRACTITIONER

## 2021-08-17 PROCEDURE — 99204 PR OFFICE/OUTPT VISIT, NEW, LEVL IV, 45-59 MIN: ICD-10-PCS | Mod: S$GLB,,, | Performed by: NURSE PRACTITIONER

## 2021-08-17 PROCEDURE — 84443 ASSAY THYROID STIM HORMONE: CPT | Performed by: NURSE PRACTITIONER

## 2021-08-17 PROCEDURE — 36415 COLL VENOUS BLD VENIPUNCTURE: CPT | Performed by: NURSE PRACTITIONER

## 2021-08-17 PROCEDURE — 83516 IMMUNOASSAY NONANTIBODY: CPT | Performed by: NURSE PRACTITIONER

## 2021-08-17 PROCEDURE — 86677 HELICOBACTER PYLORI ANTIBODY: CPT | Performed by: NURSE PRACTITIONER

## 2021-08-18 LAB — H PYLORI IGG SERPL QL IA: NEGATIVE

## 2021-08-20 ENCOUNTER — PATIENT MESSAGE (OUTPATIENT)
Dept: GASTROENTEROLOGY | Facility: CLINIC | Age: 46
End: 2021-08-20

## 2021-08-20 LAB — TTG IGA SER-ACNC: 4 UNITS

## 2021-09-14 ENCOUNTER — LAB VISIT (OUTPATIENT)
Dept: FAMILY MEDICINE | Facility: CLINIC | Age: 46
End: 2021-09-14
Payer: COMMERCIAL

## 2021-09-14 DIAGNOSIS — Z12.11 SCREENING FOR COLON CANCER: ICD-10-CM

## 2021-09-14 DIAGNOSIS — R19.7 DIARRHEA, UNSPECIFIED TYPE: ICD-10-CM

## 2021-09-14 LAB
SARS-COV-2 RNA RESP QL NAA+PROBE: NOT DETECTED
SARS-COV-2- CYCLE NUMBER: NORMAL

## 2021-09-14 PROCEDURE — U0003 INFECTIOUS AGENT DETECTION BY NUCLEIC ACID (DNA OR RNA); SEVERE ACUTE RESPIRATORY SYNDROME CORONAVIRUS 2 (SARS-COV-2) (CORONAVIRUS DISEASE [COVID-19]), AMPLIFIED PROBE TECHNIQUE, MAKING USE OF HIGH THROUGHPUT TECHNOLOGIES AS DESCRIBED BY CMS-2020-01-R: HCPCS | Performed by: NURSE PRACTITIONER

## 2021-09-14 PROCEDURE — U0005 INFEC AGEN DETEC AMPLI PROBE: HCPCS | Performed by: NURSE PRACTITIONER

## 2021-09-15 ENCOUNTER — TELEPHONE (OUTPATIENT)
Dept: ENDOSCOPY | Facility: HOSPITAL | Age: 46
End: 2021-09-15

## 2021-09-17 ENCOUNTER — ANESTHESIA (OUTPATIENT)
Dept: ENDOSCOPY | Facility: HOSPITAL | Age: 46
End: 2021-09-17
Payer: COMMERCIAL

## 2021-09-17 ENCOUNTER — ANESTHESIA EVENT (OUTPATIENT)
Dept: ENDOSCOPY | Facility: HOSPITAL | Age: 46
End: 2021-09-17
Payer: COMMERCIAL

## 2021-09-17 ENCOUNTER — HOSPITAL ENCOUNTER (OUTPATIENT)
Facility: HOSPITAL | Age: 46
Discharge: HOME | End: 2021-09-17
Attending: INTERNAL MEDICINE | Admitting: INTERNAL MEDICINE
Payer: COMMERCIAL

## 2021-09-17 VITALS
HEIGHT: 65 IN | TEMPERATURE: 97 F | HEART RATE: 90 BPM | OXYGEN SATURATION: 99 % | RESPIRATION RATE: 16 BRPM | DIASTOLIC BLOOD PRESSURE: 76 MMHG | SYSTOLIC BLOOD PRESSURE: 111 MMHG | BODY MASS INDEX: 24.99 KG/M2 | WEIGHT: 150 LBS

## 2021-09-17 DIAGNOSIS — R19.7 DIARRHEA: ICD-10-CM

## 2021-09-17 PROCEDURE — 25000003 PHARM REV CODE 250: Performed by: INTERNAL MEDICINE

## 2021-09-17 PROCEDURE — 27201012 HC FORCEPS, HOT/COLD, DISP: Performed by: INTERNAL MEDICINE

## 2021-09-17 PROCEDURE — 63600175 PHARM REV CODE 636 W HCPCS: Performed by: NURSE ANESTHETIST, CERTIFIED REGISTERED

## 2021-09-17 PROCEDURE — 88305 TISSUE EXAM BY PATHOLOGIST: CPT | Performed by: PATHOLOGY

## 2021-09-17 PROCEDURE — 45380 COLONOSCOPY AND BIOPSY: CPT | Performed by: INTERNAL MEDICINE

## 2021-09-17 PROCEDURE — 88305 TISSUE EXAM BY PATHOLOGIST: ICD-10-PCS | Mod: 26,,, | Performed by: PATHOLOGY

## 2021-09-17 PROCEDURE — 88305 TISSUE EXAM BY PATHOLOGIST: CPT | Mod: 26,,, | Performed by: PATHOLOGY

## 2021-09-17 PROCEDURE — 37000008 HC ANESTHESIA 1ST 15 MINUTES: Performed by: INTERNAL MEDICINE

## 2021-09-17 PROCEDURE — 45380 COLONOSCOPY AND BIOPSY: CPT | Mod: ,,, | Performed by: INTERNAL MEDICINE

## 2021-09-17 PROCEDURE — 37000009 HC ANESTHESIA EA ADD 15 MINS: Performed by: INTERNAL MEDICINE

## 2021-09-17 PROCEDURE — 25000003 PHARM REV CODE 250: Performed by: NURSE ANESTHETIST, CERTIFIED REGISTERED

## 2021-09-17 PROCEDURE — 45380 PR COLONOSCOPY,BIOPSY: ICD-10-PCS | Mod: ,,, | Performed by: INTERNAL MEDICINE

## 2021-09-17 RX ORDER — LIDOCAINE HCL/PF 100 MG/5ML
SYRINGE (ML) INTRAVENOUS
Status: DISCONTINUED | OUTPATIENT
Start: 2021-09-17 | End: 2021-09-17

## 2021-09-17 RX ORDER — DEXTROMETHORPHAN/PSEUDOEPHED 2.5-7.5/.8
DROPS ORAL
Status: COMPLETED | OUTPATIENT
Start: 2021-09-17 | End: 2021-09-17

## 2021-09-17 RX ORDER — SODIUM CHLORIDE 0.9 % (FLUSH) 0.9 %
10 SYRINGE (ML) INJECTION
Status: DISCONTINUED | OUTPATIENT
Start: 2021-09-17 | End: 2021-09-17 | Stop reason: HOSPADM

## 2021-09-17 RX ORDER — SODIUM CHLORIDE 9 MG/ML
INJECTION, SOLUTION INTRAVENOUS CONTINUOUS
Status: DISCONTINUED | OUTPATIENT
Start: 2021-09-17 | End: 2021-09-17 | Stop reason: HOSPADM

## 2021-09-17 RX ORDER — PROPOFOL 10 MG/ML
VIAL (ML) INTRAVENOUS CONTINUOUS PRN
Status: DISCONTINUED | OUTPATIENT
Start: 2021-09-17 | End: 2021-09-17

## 2021-09-17 RX ORDER — PROPOFOL 10 MG/ML
VIAL (ML) INTRAVENOUS
Status: DISCONTINUED | OUTPATIENT
Start: 2021-09-17 | End: 2021-09-17

## 2021-09-17 RX ADMIN — LIDOCAINE HYDROCHLORIDE 100 MG: 20 INJECTION, SOLUTION INTRAVENOUS at 09:09

## 2021-09-17 RX ADMIN — PROPOFOL 150 MCG/KG/MIN: 10 INJECTION, EMULSION INTRAVENOUS at 09:09

## 2021-09-17 RX ADMIN — SIMETHICONE 66 MG: 20 SUSPENSION/ DROPS ORAL at 09:09

## 2021-09-17 RX ADMIN — PROPOFOL 100 MG: 10 INJECTION, EMULSION INTRAVENOUS at 09:09

## 2021-09-17 RX ADMIN — PROPOFOL 40 MG: 10 INJECTION, EMULSION INTRAVENOUS at 09:09

## 2021-09-20 ENCOUNTER — TELEPHONE (OUTPATIENT)
Dept: ENDOSCOPY | Facility: HOSPITAL | Age: 46
End: 2021-09-20

## 2021-09-20 LAB
FINAL PATHOLOGIC DIAGNOSIS: NORMAL
GROSS: NORMAL
Lab: NORMAL

## 2021-12-06 ENCOUNTER — PATIENT MESSAGE (OUTPATIENT)
Dept: INTERNAL MEDICINE | Facility: CLINIC | Age: 46
End: 2021-12-06

## 2021-12-28 ENCOUNTER — OFFICE VISIT (OUTPATIENT)
Dept: NEUROLOGY | Facility: CLINIC | Age: 46
End: 2021-12-28
Payer: COMMERCIAL

## 2021-12-28 DIAGNOSIS — G43.019 INTRACTABLE MIGRAINE WITHOUT AURA AND WITHOUT STATUS MIGRAINOSUS: Primary | ICD-10-CM

## 2021-12-28 PROCEDURE — 99441 PR PHYSICIAN TELEPHONE EVALUATION 5-10 MIN: ICD-10-PCS | Mod: 95,,, | Performed by: PSYCHIATRY & NEUROLOGY

## 2021-12-28 PROCEDURE — 99441 PR PHYSICIAN TELEPHONE EVALUATION 5-10 MIN: CPT | Mod: 95,,, | Performed by: PSYCHIATRY & NEUROLOGY

## 2021-12-28 RX ORDER — RIMEGEPANT SULFATE 75 MG/75MG
75 TABLET, ORALLY DISINTEGRATING ORAL ONCE AS NEEDED
Qty: 8 TABLET | Refills: 5 | Status: SHIPPED | OUTPATIENT
Start: 2021-12-28 | End: 2021-12-28

## 2021-12-28 RX ORDER — GABAPENTIN 300 MG/1
300 CAPSULE ORAL 3 TIMES DAILY
Qty: 270 CAPSULE | Refills: 1 | Status: SHIPPED | OUTPATIENT
Start: 2021-12-28 | End: 2021-12-28

## 2021-12-28 RX ORDER — GALCANEZUMAB 120 MG/ML
INJECTION, SOLUTION SUBCUTANEOUS
Qty: 1 ML | Refills: 5 | Status: SHIPPED | OUTPATIENT
Start: 2021-12-28 | End: 2021-12-30 | Stop reason: SDUPTHER

## 2021-12-28 RX ORDER — NAPROXEN SODIUM 550 MG/1
550 TABLET ORAL EVERY 8 HOURS
Qty: 30 TABLET | Refills: 1 | Status: SHIPPED | OUTPATIENT
Start: 2021-12-29 | End: 2021-12-30 | Stop reason: SDUPTHER

## 2021-12-28 RX ORDER — GABAPENTIN 300 MG/1
300 CAPSULE ORAL 3 TIMES DAILY
Qty: 270 CAPSULE | Refills: 1 | Status: SHIPPED | OUTPATIENT
Start: 2021-12-29 | End: 2021-12-30 | Stop reason: SDUPTHER

## 2021-12-28 RX ORDER — ONDANSETRON 4 MG/1
8 TABLET, FILM COATED ORAL EVERY 8 HOURS PRN
Qty: 21 TABLET | Refills: 0 | Status: SHIPPED | OUTPATIENT
Start: 2021-12-28 | End: 2021-12-30 | Stop reason: SDUPTHER

## 2021-12-29 ENCOUNTER — PATIENT MESSAGE (OUTPATIENT)
Dept: NEUROLOGY | Facility: CLINIC | Age: 46
End: 2021-12-29

## 2021-12-29 RX ORDER — NAPROXEN SODIUM 550 MG/1
TABLET ORAL
Qty: 30 TABLET | Refills: 1 | Status: CANCELLED | OUTPATIENT
Start: 2021-12-29

## 2021-12-29 RX ORDER — GALCANEZUMAB 120 MG/ML
INJECTION, SOLUTION SUBCUTANEOUS
Qty: 1 EACH | Refills: 5 | Status: CANCELLED | OUTPATIENT
Start: 2021-12-29

## 2021-12-29 RX ORDER — GABAPENTIN 300 MG/1
300 CAPSULE ORAL 3 TIMES DAILY
Qty: 270 CAPSULE | Refills: 1 | Status: CANCELLED | OUTPATIENT
Start: 2021-12-29 | End: 2022-06-27

## 2021-12-29 RX ORDER — ONDANSETRON 4 MG/1
8 TABLET, FILM COATED ORAL EVERY 8 HOURS PRN
Qty: 21 TABLET | Refills: 0 | Status: CANCELLED | OUTPATIENT
Start: 2021-12-29

## 2021-12-30 DIAGNOSIS — G43.019 INTRACTABLE MIGRAINE WITHOUT AURA AND WITHOUT STATUS MIGRAINOSUS: Primary | ICD-10-CM

## 2021-12-30 RX ORDER — GABAPENTIN 300 MG/1
300 CAPSULE ORAL 3 TIMES DAILY
Qty: 270 CAPSULE | Refills: 1 | Status: SHIPPED | OUTPATIENT
Start: 2021-12-30 | End: 2023-02-09 | Stop reason: SDUPTHER

## 2021-12-30 RX ORDER — ONDANSETRON 4 MG/1
8 TABLET, FILM COATED ORAL EVERY 8 HOURS PRN
Qty: 21 TABLET | Refills: 0 | Status: SHIPPED | OUTPATIENT
Start: 2021-12-30

## 2021-12-30 RX ORDER — GALCANEZUMAB 120 MG/ML
INJECTION, SOLUTION SUBCUTANEOUS
Qty: 1 ML | Refills: 5 | Status: SHIPPED | OUTPATIENT
Start: 2021-12-30 | End: 2022-10-27 | Stop reason: SDUPTHER

## 2021-12-30 RX ORDER — NAPROXEN SODIUM 550 MG/1
550 TABLET ORAL EVERY 8 HOURS
Qty: 30 TABLET | Refills: 1 | Status: SHIPPED | OUTPATIENT
Start: 2021-12-30 | End: 2023-02-09 | Stop reason: SDUPTHER

## 2022-01-05 ENCOUNTER — TELEPHONE (OUTPATIENT)
Dept: PHARMACY | Facility: CLINIC | Age: 47
End: 2022-01-05
Payer: COMMERCIAL

## 2022-01-10 ENCOUNTER — OFFICE VISIT (OUTPATIENT)
Dept: GASTROENTEROLOGY | Facility: CLINIC | Age: 47
End: 2022-01-10
Payer: COMMERCIAL

## 2022-01-10 ENCOUNTER — PATIENT OUTREACH (OUTPATIENT)
Dept: ADMINISTRATIVE | Facility: OTHER | Age: 47
End: 2022-01-10
Payer: COMMERCIAL

## 2022-01-10 ENCOUNTER — TELEPHONE (OUTPATIENT)
Dept: GASTROENTEROLOGY | Facility: CLINIC | Age: 47
End: 2022-01-10
Payer: COMMERCIAL

## 2022-01-10 ENCOUNTER — PATIENT MESSAGE (OUTPATIENT)
Dept: INTERNAL MEDICINE | Facility: CLINIC | Age: 47
End: 2022-01-10
Payer: COMMERCIAL

## 2022-01-10 ENCOUNTER — HOSPITAL ENCOUNTER (OUTPATIENT)
Dept: RADIOLOGY | Facility: HOSPITAL | Age: 47
Discharge: HOME | End: 2022-01-10
Attending: INTERNAL MEDICINE
Payer: COMMERCIAL

## 2022-01-10 VITALS — HEIGHT: 65 IN | BODY MASS INDEX: 25.64 KG/M2 | WEIGHT: 153.88 LBS

## 2022-01-10 DIAGNOSIS — R10.31 RIGHT LOWER QUADRANT PAIN: ICD-10-CM

## 2022-01-10 PROCEDURE — 74177 CT ABD & PELVIS W/CONTRAST: CPT | Mod: 26,,, | Performed by: RADIOLOGY

## 2022-01-10 PROCEDURE — 99999 PR PBB SHADOW E&M-EST. PATIENT-LVL III: CPT | Mod: PBBFAC,,, | Performed by: INTERNAL MEDICINE

## 2022-01-10 PROCEDURE — 74177 CT ABDOMEN PELVIS WITH CONTRAST: ICD-10-PCS | Mod: 26,,, | Performed by: RADIOLOGY

## 2022-01-10 PROCEDURE — 74177 CT ABD & PELVIS W/CONTRAST: CPT | Mod: TC

## 2022-01-10 PROCEDURE — 99214 PR OFFICE/OUTPT VISIT, EST, LEVL IV, 30-39 MIN: ICD-10-PCS | Mod: S$GLB,,, | Performed by: INTERNAL MEDICINE

## 2022-01-10 PROCEDURE — 99999 PR PBB SHADOW E&M-EST. PATIENT-LVL III: ICD-10-PCS | Mod: PBBFAC,,, | Performed by: INTERNAL MEDICINE

## 2022-01-10 PROCEDURE — 99214 OFFICE O/P EST MOD 30 MIN: CPT | Mod: S$GLB,,, | Performed by: INTERNAL MEDICINE

## 2022-01-10 PROCEDURE — 25500020 PHARM REV CODE 255: Performed by: INTERNAL MEDICINE

## 2022-01-10 RX ORDER — DICYCLOMINE HYDROCHLORIDE 10 MG/1
10 CAPSULE ORAL
Qty: 60 CAPSULE | Refills: 1 | Status: SHIPPED | OUTPATIENT
Start: 2022-01-10 | End: 2022-03-18

## 2022-01-10 RX ADMIN — IOHEXOL 75 ML: 350 INJECTION, SOLUTION INTRAVENOUS at 05:01

## 2022-01-10 NOTE — PROGRESS NOTES
GASTROENTEROLOGY CLINIC NOTE    Reason for visit: The encounter diagnosis was Right lower quadrant pain.  Referring provider/PCP: Lourdes Felipe MD    HPI:  Ksenia Arce is a 46 y.o. female here today for eval abd pain, urgent add on visit, previously established with philipp CORTEZ    She started with abd pain, RLQ, about 3 or so days ago. Some gas. Feels deeper, worse with standing. Heating pad helps. gasx didn't help. Normal bowel habits. Had BM today.   Some mild nausea. No vomiting.       Prior Endoscopy:  EGD:  Colon:  2021 fitton  Normal ti and normal colon with normal colon random biopsies    (Portions of this note were dictated using voice recognition software and may contain dictation related errors in spelling/grammar/syntax not found on text review)    Review of Systems   Constitutional: Negative for fever and malaise/fatigue.   Respiratory: Negative for cough and shortness of breath.    Cardiovascular: Negative for chest pain and palpitations.   Gastrointestinal: Positive for abdominal pain and nausea. Negative for blood in stool and vomiting.   Neurological: Negative for dizziness and headaches.       Past Medical History: has a past medical history of Breast cancer, H/O total hysterectomy, Mental disorder, Migraine headache, and Neuropathy.    Past Surgical History: has a past surgical history that includes Mastectomy (Left); Breast Capsulectomy; Hysterectomy; Tonsillectomy; Breast reconstruction; and Colonoscopy (N/A, 9/17/2021).    Family History:family history includes Clotting disorder in her mother; Heart disease in her father.    Allergies: Review of patient's allergies indicates:  No Known Allergies    Social History: reports that she has never smoked. She has never used smokeless tobacco. She reports current alcohol use. She reports that she does not use drugs.    Home medications:   Current Outpatient Medications on File Prior to Visit   Medication Sig Dispense Refill    albuterol  "(PROVENTIL HFA) 90 mcg/actuation inhaler Inhale 2 puffs into the lungs every 6 (six) hours as needed for Wheezing. Rescue 18 g 0    anastrozole (ARIMIDEX) 1 mg Tab TK 1 T PO QD  2    atorvastatin (LIPITOR) 20 MG tablet Take 1 tablet (20 mg total) by mouth once daily. 90 tablet 1    cholecalciferol, vitamin D3, 25 mcg (1,000 unit) Chew Take 1 tablet by mouth once daily.      FLUoxetine 40 MG capsule Take 1 capsule by mouth once daily.      gabapentin (NEURONTIN) 300 MG capsule Take 1 capsule (300 mg total) by mouth 3 (three) times daily. 270 capsule 1    galcanezumab-gnlm (EMGALITY PEN) 120 mg/mL PnIj Inject one pen under the skin every 30 days. Keep in fridge. Remove for 30 minutes prior to the injection. (Patient taking differently: Inject one pen under the skin every 30 days. Keep in fridge. Remove for 30 minutes prior to the injection.) 1 mL 5    lidocaine 5 % Gel Apply to left chest wall bid prn pain 1 Tube 2    LORazepam (ATIVAN) 0.5 MG tablet Take 1 tablet by mouth daily as needed.      naproxen sodium (ANAPROX) 550 MG tablet Take 1 tablet (550 mg total) by mouth every 8 (eight) hours. 30 tablet 1    ondansetron (ZOFRAN) 4 MG tablet Take 2 tablets (8 mg total) by mouth every 8 (eight) hours as needed for Nausea. 21 tablet 0    rimegepant 75 mg odt Place ODT tablet on the tongue; alternatively the ODT tablet may be placed under the tongue 8 tablet 5    valACYclovir (VALTREX) 1000 MG tablet Take 1 tablet (1,000 mg total) by mouth 2 (two) times daily as needed. 30 tablet 3    [DISCONTINUED] ascorbic acid, vitamin C, (VITAMIN C) 500 MG tablet Take 500 mg by mouth once daily.       No current facility-administered medications on file prior to visit.       Vital signs:  Ht 5' 5" (1.651 m)   Wt 69.8 kg (153 lb 14.1 oz)   LMP 11/27/2013 Comment: GOPAL  BMI 25.61 kg/m²     Physical Exam  Vitals reviewed.   Constitutional:       General: She is not in acute distress.  HENT:      Head: Normocephalic and " atraumatic.   Eyes:      General: No scleral icterus.     Conjunctiva/sclera: Conjunctivae normal.   Abdominal:      Tenderness: There is abdominal tenderness (rlq at mcburney point). There is no rebound.      Comments: Low transverse scar from reconstruction surgery  carnetts is equivocal    Had pain on lying down on exam table   Skin:     General: Skin is warm.      Coloration: Skin is not pale.      Findings: No rash.   Neurological:      Mental Status: She is oriented to person, place, and time.      Gait: Gait normal.   Psychiatric:         Mood and Affect: Mood normal.         Behavior: Behavior normal.         I have reviewed prior labs, imaging, notes from last month    Assessment:  1. Right lower quadrant pain      Fairly acute in nature, with worsening on abd extension, pain at mcburney point.    Plan:  Orders Placed This Encounter    CT Abdomen Pelvis With Contrast    Basic Metabolic Panel    CBC Auto Differential    dicyclomine (BENTYL) 10 MG capsule     Basic labs  CT Scan ordered stat, rule out appendicitis    Trial the bentyl prn    RTC based on results    Alec Raphael MD  [facility] [place]

## 2022-01-10 NOTE — TELEPHONE ENCOUNTER
Spoke to patient she states that she been having lower right abdominal pain since Friday it comes and goes. She can tolerate the pain, took gas medication to see if that was it but the pain is still their. Asked patient if she had any chills, fever, nausea , vomiting or any other symptoms, she stated no, the only thing is that she is cold but it could be the weather but she doesn't think she has a fever.  I told patient DIEGO Lantigua is not I clinic today but I will forward the message to Dr. Raphael.

## 2022-01-10 NOTE — PROGRESS NOTES
Health Maintenance Due   Topic Date Due    Pneumococcal Vaccines (Age 0-64) (2 of 4 - PCV13) 07/29/2021    COVID-19 Vaccine (3 - Booster for Moderna series) 10/06/2021     Updates were requested from care everywhere.  Chart was reviewed for overdue Proactive [facility] Encounters (JAY[facility]) topics (CRS, Breast Cancer Screening, Eye exam)  Health Maintenance has been updated.  LINKS immunization registry triggered.  Immunizations were reconciled.

## 2022-01-11 LAB
CREAT SERPL-MCNC: 0.7 MG/DL (ref 0.5–1.4)
SAMPLE: NORMAL

## 2022-01-14 ENCOUNTER — TELEPHONE (OUTPATIENT)
Dept: INTERNAL MEDICINE | Facility: CLINIC | Age: 47
End: 2022-01-14
Payer: COMMERCIAL

## 2022-01-14 NOTE — TELEPHONE ENCOUNTER
----- Message from Brianna Riggs sent at 1/14/2022 11:21 AM CST -----  Contact: pt [phone]  Patient tested positive for COVID-19 on yesterday. Wants to know is there anything they should be doing and is a Rx needed..    [facility]  [address]  [place] [address]  Phone: [phone] Fax: [phone]    Please call and advise.     Thank You

## 2022-01-14 NOTE — TELEPHONE ENCOUNTER
Spoke to pt, gave list of OTC meds that we recommend, pt has them all and will start. Denies any SOB or any concerning symptoms, just nasal congestion and sore throat.

## 2022-01-22 NOTE — TELEPHONE ENCOUNTER
No new care gaps identified.  Powered by [employer]. Reference number: [id number].   1/22/2022 7:59:41 AM CST

## 2022-01-28 ENCOUNTER — OFFICE VISIT (OUTPATIENT)
Dept: INTERNAL MEDICINE | Facility: CLINIC | Age: 47
End: 2022-01-28
Payer: COMMERCIAL

## 2022-01-28 VITALS
DIASTOLIC BLOOD PRESSURE: 70 MMHG | WEIGHT: 152.13 LBS | SYSTOLIC BLOOD PRESSURE: 110 MMHG | TEMPERATURE: 98 F | RESPIRATION RATE: 16 BRPM | OXYGEN SATURATION: 98 % | HEART RATE: 80 BPM | HEIGHT: 65 IN | BODY MASS INDEX: 25.34 KG/M2

## 2022-01-28 DIAGNOSIS — C50.212 MALIGNANT NEOPLASM OF UPPER-INNER QUADRANT OF LEFT FEMALE BREAST, UNSPECIFIED ESTROGEN RECEPTOR STATUS: ICD-10-CM

## 2022-01-28 DIAGNOSIS — G43.009 MIGRAINE WITHOUT AURA AND WITHOUT STATUS MIGRAINOSUS, NOT INTRACTABLE: ICD-10-CM

## 2022-01-28 DIAGNOSIS — Z00.00 WELLNESS EXAMINATION: ICD-10-CM

## 2022-01-28 DIAGNOSIS — E78.5 HYPERLIPIDEMIA, UNSPECIFIED HYPERLIPIDEMIA TYPE: Primary | ICD-10-CM

## 2022-01-28 DIAGNOSIS — F41.1 GENERALIZED ANXIETY DISORDER: ICD-10-CM

## 2022-01-28 DIAGNOSIS — R19.7 DIARRHEA, UNSPECIFIED TYPE: ICD-10-CM

## 2022-01-28 PROCEDURE — 3008F PR BODY MASS INDEX (BMI) DOCUMENTED: ICD-10-PCS | Mod: CPTII,S$GLB,, | Performed by: INTERNAL MEDICINE

## 2022-01-28 PROCEDURE — 3074F SYST BP LT 130 MM HG: CPT | Mod: CPTII,S$GLB,, | Performed by: INTERNAL MEDICINE

## 2022-01-28 PROCEDURE — 99999 PR PBB SHADOW E&M-EST. PATIENT-LVL III: ICD-10-PCS | Mod: PBBFAC,,, | Performed by: INTERNAL MEDICINE

## 2022-01-28 PROCEDURE — 1159F PR MEDICATION LIST DOCUMENTED IN MEDICAL RECORD: ICD-10-PCS | Mod: CPTII,S$GLB,, | Performed by: INTERNAL MEDICINE

## 2022-01-28 PROCEDURE — 3078F PR MOST RECENT DIASTOLIC BLOOD PRESSURE < 80 MM HG: ICD-10-PCS | Mod: CPTII,S$GLB,, | Performed by: INTERNAL MEDICINE

## 2022-01-28 PROCEDURE — 3008F BODY MASS INDEX DOCD: CPT | Mod: CPTII,S$GLB,, | Performed by: INTERNAL MEDICINE

## 2022-01-28 PROCEDURE — 3078F DIAST BP <80 MM HG: CPT | Mod: CPTII,S$GLB,, | Performed by: INTERNAL MEDICINE

## 2022-01-28 PROCEDURE — 3074F PR MOST RECENT SYSTOLIC BLOOD PRESSURE < 130 MM HG: ICD-10-PCS | Mod: CPTII,S$GLB,, | Performed by: INTERNAL MEDICINE

## 2022-01-28 PROCEDURE — 99999 PR PBB SHADOW E&M-EST. PATIENT-LVL III: CPT | Mod: PBBFAC,,, | Performed by: INTERNAL MEDICINE

## 2022-01-28 PROCEDURE — 99396 PREV VISIT EST AGE 40-64: CPT | Mod: S$GLB,,, | Performed by: INTERNAL MEDICINE

## 2022-01-28 PROCEDURE — 1159F MED LIST DOCD IN RCRD: CPT | Mod: CPTII,S$GLB,, | Performed by: INTERNAL MEDICINE

## 2022-01-28 PROCEDURE — 99396 PR PREVENTIVE VISIT,EST,40-64: ICD-10-PCS | Mod: S$GLB,,, | Performed by: INTERNAL MEDICINE

## 2022-01-28 RX ORDER — ATORVASTATIN CALCIUM 20 MG/1
20 TABLET, FILM COATED ORAL DAILY
Qty: 90 TABLET | Refills: 1 | Status: SHIPPED | OUTPATIENT
Start: 2022-01-28 | End: 2022-02-17

## 2022-01-28 NOTE — PROGRESS NOTES
[facility] Note    Chief Complaint      Chief Complaint   Patient presents with    Annual Exam     History of Present Illness      Ksenia Arce is a 46 y.o. female who presents today for chief complaint annual wellness .    HPI   Had cbc bmp in Jan with Dr Raphael, last lipids 7.21as well as TSH   -  Diarrhea resolved    Annual 1.28.21 w/ labs at that time   Migraines: saw Dr Shadia Valdivia in Neuro here at [facility] who put her on nurtec and triptan as well as gabapentin for myalgias which is helping as well as lidocaine gel, emgality is approved which is successful for her    formerly seen Dr Maya Novoa at [facility]   Is now on emgality and odt rescue      Breast cancer Dr Lopez - had mmg 6.18.21 scanned in, is on arimidex   Sees Dr Armstrong  br ca hx - shes does DEXA which is utd    HLD was started on lipitor for LDL>190 despite nl ASCVD 6 months ago   Has new job working for [employer]  Anxiety on prozac and prn lorazepam - [facility]   Psych brent was on buspar prn and she self dcf is on prozac and and prn ativan, increased stress related to [place], her house did ok       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 [employer]    Mom is asim salcedo     cscope 9.2021 for diarrhea 10 year repeat     Active Problem List with Overview Notes    Diagnosis Date Noted    Hyperlipidemia 01/31/2022    Diarrhea 09/17/2021    Advice given about COVID-19 virus infection 01/28/2021    Psoriasis 07/29/2020    Malignant neoplasm of left breast 01/23/2020     lobualr breast cancer of L breast dx in 5/2016 found on BSE, felt mass, had us and MMG was told was cyst but 2/2 pain sought eval by Dr surgeon Sr Lopez who did bx  -had mastectomy on L side and LN dissection , was in 6/9, chemo mastectomy radiation, taxol and doxirubicin, radiation x36, completed in 5/17, has been on arimidex since then, will beon for 10 total   -gets mmg and us q6 mos warner  Onc Julia Armstrong - she  follows [facility]  [facility]yn [facility]   Plastics [facility]   Had brca testing which was negative, er positive, her2 neg         Migraines 01/23/2020    Generalized anxiety disorder 01/23/2020     On daily prozac, sees adam flores, uses prn ativan 2-3x a month       Herpes simplex 01/23/2020     Takes valcyte prn outbreak, oral lesions      History of total abdominal hysterectomy 01/23/2020     S/p with Yossi          Health Maintenance   Topic Date Due    Mammogram  06/18/2022    TETANUS VACCINE  06/01/2025    Lipid Panel  07/28/2026    Hepatitis C Screening  Completed       Past Medical History:   Diagnosis Date    Breast cancer 05/2016    H/O total hysterectomy     Mental disorder     Migraine headache     Neuropathy        Past Surgical History:   Procedure Laterality Date    BREAST CAPSULECTOMY      BREAST RECONSTRUCTION      COLONOSCOPY N/A 9/17/2021    Procedure: COLONOSCOPY;  Surgeon: Diony Montes MD;  Location: [facility];  Service: Endoscopy;  Laterality: N/A;    HYSTERECTOMY      MASTECTOMY Left     TONSILLECTOMY         family history includes Clotting disorder in her mother; Heart disease in her father.    Social History     Tobacco Use    Smoking status: Never Smoker    Smokeless tobacco: Never Used   Substance Use Topics    Alcohol use: Yes    Drug use: Never       Review of Systems   Constitutional: Negative for chills and fever.   HENT: Negative for congestion and sore throat.    Eyes: Negative for blurred vision and discharge.   Respiratory: Negative for cough and shortness of breath.    Cardiovascular: Negative for chest pain and palpitations.   Gastrointestinal: Negative for constipation, diarrhea, nausea and vomiting.   Genitourinary: Negative for dysuria and hematuria.   Musculoskeletal: Negative for falls and myalgias.   Skin: Negative for itching and rash.   Neurological: Negative for dizziness and headaches.        Outpatient Encounter Medications as of  1/28/2022   Medication Sig Note Dispense Refill    albuterol (PROVENTIL HFA) 90 mcg/actuation inhaler Inhale 2 puffs into the lungs every 6 (six) hours as needed for Wheezing. Rescue  18 g 0    anastrozole (ARIMIDEX) 1 mg Tab TK 1 T PO QD 1/10/2018: Received from: External Pharmacy  2    FLUoxetine 40 MG capsule Take 1 capsule by mouth once daily.       gabapentin (NEURONTIN) 300 MG capsule Take 1 capsule (300 mg total) by mouth 3 (three) times daily.  270 capsule 1    galcanezumab-gnlm (EMGALITY PEN) 120 mg/mL PnIj Inject one pen under the skin every 30 days. Keep in fridge. Remove for 30 minutes prior to the injection. (Patient taking differently: Inject one pen under the skin every 30 days. Keep in fridge. Remove for 30 minutes prior to the injection.)  1 mL 5    LORazepam (ATIVAN) 0.5 MG tablet Take 1 tablet by mouth daily as needed. 7/29/2020: Take as needed      naproxen sodium (ANAPROX) 550 MG tablet Take 1 tablet (550 mg total) by mouth every 8 (eight) hours.  30 tablet 1    ondansetron (ZOFRAN) 4 MG tablet Take 2 tablets (8 mg total) by mouth every 8 (eight) hours as needed for Nausea.  21 tablet 0    rimegepant 75 mg odt Place ODT tablet on the tongue; alternatively the ODT tablet may be placed under the tongue  8 tablet 5    valACYclovir (VALTREX) 1000 MG tablet Take 1 tablet (1,000 mg total) by mouth 2 (two) times daily as needed. 7/29/2020: Take as needed 30 tablet 3    [DISCONTINUED] atorvastatin (LIPITOR) 20 MG tablet Take 1 tablet (20 mg total) by mouth once daily.  90 tablet 1    atorvastatin (LIPITOR) 20 MG tablet Take 1 tablet (20 mg total) by mouth once daily.  90 tablet 1    cholecalciferol, vitamin D3, 25 mcg (1,000 unit) Chew Take 1 tablet by mouth once daily.       dicyclomine (BENTYL) 10 MG capsule Take 1 capsule (10 mg total) by mouth before meals as needed. (Patient not taking: Reported on 1/28/2022)  60 capsule 1    [DISCONTINUED] lidocaine 5 % Gel Apply to left chest wall  "bid prn pain (Patient not taking: Reported on 1/28/2022)  1 Tube 2     No facility-administered encounter medications on file as of 1/28/2022.        Review of patient's allergies indicates:  No Known Allergies        Physical Exam      Vital Signs  Temp: 98.3 °F (36.8 °C)  Temp src: Oral  Pulse: 80  Resp: 16  SpO2: 98 %  BP: 110/70  BP Location: Right arm  Patient Position: Sitting  Height and Weight  Height: 5' 5" (165.1 cm)  Weight: 69 kg (152 lb 1.9 oz)  BSA (Calculated - sq m): 1.78 sq meters  BMI (Calculated): 25.3  Weight in (lb) to have BMI = 25: 149.9]    Physical Exam  Vitals reviewed.   Constitutional:       Appearance: She is well-developed and well-nourished.   HENT:      Head: Normocephalic and atraumatic.      Right Ear: External ear normal.      Left Ear: External ear normal.   Eyes:      General:         Right eye: No discharge.         Left eye: No discharge.   Neck:      Thyroid: No thyromegaly.   Cardiovascular:      Rate and Rhythm: Normal rate and regular rhythm.      Pulses: Intact distal pulses.      Heart sounds: Normal heart sounds. No murmur heard.      Pulmonary:      Effort: Pulmonary effort is normal. No respiratory distress.      Breath sounds: Normal breath sounds.   Abdominal:      General: Bowel sounds are normal. There is no distension.      Palpations: Abdomen is soft.      Tenderness: There is no abdominal tenderness.   Musculoskeletal:         General: No deformity. Normal range of motion.      Cervical back: Normal range of motion.   Skin:     General: Skin is warm and dry.      Findings: No rash.   Neurological:      Mental Status: She is alert and oriented to person, place, and time.   Psychiatric:         Mood and Affect: Mood and affect normal.         Behavior: Behavior normal.          Laboratory:  CBC:  Recent Labs   Lab Result Units 01/10/22  1534   WBC K/uL 5.52   RBC M/uL 4.88   Hemoglobin g/dL 14.3   Hematocrit % 42.5   Platelets K/uL 235   MCV fL 87   MCH pg 29.3 "   MCHC g/dL 33.6     CMP:  Recent Labs   Lab Result Units 01/10/22  1534   Glucose mg/dL 86   Calcium mg/dL 10.1   Sodium mmol/L 138   Potassium mmol/L 4.1   CO2 mmol/L 29   Chloride mmol/L 102   BUN mg/dL 17     URINALYSIS:  No results for input(s): COLORU, CLARITYU, SPECGRAV, PHUR, PROTEINUA, GLUCOSEU, BILIRUBINCON, BLOODU, WBCU, RBCU, BACTERIA, MUCUS, NITRITE, LEUKOCYTESUR, UROBILINOGEN, HYALINECASTS in the last 2160 hours.   LIPIDS:  No results for input(s): TSH, HDL, CHOL, TRIG, LDLCALC, CHOLHDL, NONHDLCHOL, TOTALCHOLEST in the last 2160 hours.  TSH:  No results for input(s): TSH in the last 2160 hours.  A1C:  No results for input(s): HGBA1C in the last 2160 hours.      Assessment/Plan     Ksenia Arce is a 46 y.o.female with:    Annual   age related vaccines and screenings up to date      Migraines      Per neuro     Generalized anxiety disorder  Doing well on ssri, ok to assume rx     Diarrhea  resolved    Malignant neoplasm of left breast  Follow up withLaura Hernandez and Bebe to est final plan of care, ref to Laura Horne and [facility] as needed    Hyperlipidemia  Kyaw lipids  Discussed genetics with patient who declines at this time       Orders Placed This Encounter   Procedures    Lipid Panel     Standing Status:   Future     Standing Expiration Date:   3/29/2023       Use of the [facility] Patient Portal discussed and encouraged during today's visit  -Continue current medications and maintain follow up with specialists.  Return to clinic in 6 months.  Future Appointments   Date Time Provider Department Center   1/31/2022  8:40 AM [facility], [facility]   5/16/2022  8:30 AM Lourdes Felipe MD [facility]       Lourdes Felipe MD  1/31/2022 2:41 PM    Primary Care Internal Medicine - [facility]

## 2022-01-31 PROBLEM — E78.5 HYPERLIPIDEMIA: Status: ACTIVE | Noted: 2022-01-31

## 2022-01-31 PROBLEM — A09 TRAVELER'S DIARRHEA: Status: RESOLVED | Noted: 2021-08-02 | Resolved: 2022-01-31

## 2022-01-31 NOTE — ASSESSMENT & PLAN NOTE
Follow up withLaura Hernandez and Bebe to est final plan of care, ref to Laura Horne and S[facility] as needed

## 2022-02-04 NOTE — TELEPHONE ENCOUNTER
Quick DC. Request already responded to by other means (e.g. phone or fax)   Refill Authorization Note   Ksenia Arce  is requesting a refill authorization.  Brief Assessment and Rationale for Refill:  Quick Discontinue  Medication Therapy Plan:       Medication Reconciliation Completed:  No      Comments:   Pended Medication(s)       Requested Prescriptions     Pending Prescriptions Disp Refills    atorvastatin (LIPITOR) 20 MG tablet [Pharmacy Med Name: ATORVASTATIN 20 MG TABLET] 90 tablet 1     Sig: TAKE 1 TABLET BY MOUTH EVERY DAY        Duplicate Pended Encounter(s)/ Last Prescribed Details: (includes pharmacy & prescriber details)   Ordering Encounter Report    Associated Reports   View Encounter              Note composed:11:51 AM 02/04/2022

## 2022-02-07 RX ORDER — ATORVASTATIN CALCIUM 20 MG/1
TABLET, FILM COATED ORAL
Qty: 90 TABLET | Refills: 1 | OUTPATIENT
Start: 2022-02-07

## 2022-02-07 NOTE — TELEPHONE ENCOUNTER
I have reviewed the assessment below.  This was sent to [facility] at  by PCP; will refuse duplicate request that came before OV.  Thanks

## 2022-02-08 ENCOUNTER — PATIENT MESSAGE (OUTPATIENT)
Dept: INTERNAL MEDICINE | Facility: CLINIC | Age: 47
End: 2022-02-08
Payer: COMMERCIAL

## 2022-02-16 ENCOUNTER — PATIENT MESSAGE (OUTPATIENT)
Dept: INTERNAL MEDICINE | Facility: CLINIC | Age: 47
End: 2022-02-16
Payer: COMMERCIAL

## 2022-02-17 RX ORDER — ATORVASTATIN CALCIUM 40 MG/1
40 TABLET, FILM COATED ORAL DAILY
Qty: 90 TABLET | Refills: 1
Start: 2022-02-17 | End: 2022-02-25 | Stop reason: SDUPTHER

## 2022-02-23 ENCOUNTER — PATIENT MESSAGE (OUTPATIENT)
Dept: OPTOMETRY | Facility: CLINIC | Age: 47
End: 2022-02-23
Payer: COMMERCIAL

## 2022-02-23 ENCOUNTER — PATIENT MESSAGE (OUTPATIENT)
Dept: INTERNAL MEDICINE | Facility: CLINIC | Age: 47
End: 2022-02-23
Payer: COMMERCIAL

## 2022-02-23 DIAGNOSIS — Z78.0 MENOPAUSE: Primary | ICD-10-CM

## 2022-02-23 DIAGNOSIS — C50.212 MALIGNANT NEOPLASM OF UPPER-INNER QUADRANT OF LEFT FEMALE BREAST, UNSPECIFIED ESTROGEN RECEPTOR STATUS: ICD-10-CM

## 2022-02-25 RX ORDER — ATORVASTATIN CALCIUM 40 MG/1
40 TABLET, FILM COATED ORAL DAILY
Qty: 90 TABLET | Refills: 1 | Status: SHIPPED | OUTPATIENT
Start: 2022-02-25 | End: 2022-10-14

## 2022-02-25 NOTE — TELEPHONE ENCOUNTER
No new care gaps identified.  Powered by [employer] by [employer]. Reference number: [id number].   2/25/2022 8:37:28 AM CST

## 2022-02-25 NOTE — TELEPHONE ENCOUNTER
No new care gaps identified.  Powered by [employer] by [employer]. Reference number: [id number].   2/25/2022 12:25:09 PM CST

## 2022-02-28 ENCOUNTER — OFFICE VISIT (OUTPATIENT)
Dept: OPTOMETRY | Facility: CLINIC | Age: 47
End: 2022-02-28
Payer: COMMERCIAL

## 2022-02-28 DIAGNOSIS — H52.03 HYPEROPIA WITH PRESBYOPIA, BILATERAL: ICD-10-CM

## 2022-02-28 DIAGNOSIS — Z98.890 STATUS POST LASIK SURGERY OF BOTH EYES: ICD-10-CM

## 2022-02-28 DIAGNOSIS — H52.4 HYPEROPIA WITH PRESBYOPIA, BILATERAL: ICD-10-CM

## 2022-02-28 DIAGNOSIS — Z01.00 COMPLETE EYE EXAM, ENCOUNTER FOR: Primary | ICD-10-CM

## 2022-02-28 PROCEDURE — 99999 PR PBB SHADOW E&M-EST. PATIENT-LVL III: CPT | Mod: PBBFAC,,, | Performed by: OPTOMETRIST

## 2022-02-28 PROCEDURE — 92004 PR EYE EXAM, NEW PATIENT,COMPREHESV: ICD-10-PCS | Mod: S$GLB,,, | Performed by: OPTOMETRIST

## 2022-02-28 PROCEDURE — 92004 COMPRE OPH EXAM NEW PT 1/>: CPT | Mod: S$GLB,,, | Performed by: OPTOMETRIST

## 2022-02-28 PROCEDURE — 92015 DETERMINE REFRACTIVE STATE: CPT | Mod: S$GLB,,, | Performed by: OPTOMETRIST

## 2022-02-28 PROCEDURE — 99999 PR PBB SHADOW E&M-EST. PATIENT-LVL III: ICD-10-PCS | Mod: PBBFAC,,, | Performed by: OPTOMETRIST

## 2022-02-28 PROCEDURE — 92015 PR REFRACTION: ICD-10-PCS | Mod: S$GLB,,, | Performed by: OPTOMETRIST

## 2022-02-28 RX ORDER — ATORVASTATIN CALCIUM 40 MG/1
40 TABLET, FILM COATED ORAL DAILY
Qty: 90 TABLET | Refills: 1
Start: 2022-02-28 | End: 2022-05-19 | Stop reason: SDUPTHER

## 2022-02-28 NOTE — PROGRESS NOTES
HPI     CC: Pt is here today for a Routine eye exam. She states that she is   having trouble with her near vision. She would like something that she can   leave on all of the time.  AMINATA: 2 years     (+) Changes in vision , near  (-) Pain  (-) Irritation   (-) Itching   (-) Flashes  (-) Floaters  (+) Glasses wearer, reading only  (-) CL wearer  (-) Uses eye gtts    Does patient want a refraction today? yes    (-) Eye injury  (+) Eye surgery , LASIK OU  (-)POHx  (+)FOHx, Glaucoma    (-)DM  No results found for: HGBA1C          Last edited by Rene Weeks on 2/28/2022  9:09 AM. (History)        ROS     Negative for: Constitutional, Gastrointestinal, Neurological, Skin,   Genitourinary, Musculoskeletal, HENT, Endocrine, Cardiovascular, Eyes,   Respiratory, Psychiatric, Allergic/Imm, Heme/Lymph    Last edited by Praveena Thompson, OD on 2/28/2022  9:53 AM. (History)        Assessment /Plan     For exam results, see Encounter Report.    Complete eye exam, encounter for    Hyperopia with presbyopia, bilateral    Status post LASIK surgery of both eyes        MONITOR. ED PT ON ALL EXAM FINDINGS  RX FINALS SPECS. CONSIDER PAL OR COMPUTER BIFOCAL FOR NEAR WORK DEMANDS. PT INTERESTED IN FUNCTIONAL SPECS   RTC 1 YR//PRN FOR REE/DFE   OCULAR HEALTH WNL OD, OS S/P LASIK OU

## 2022-03-17 ENCOUNTER — HOSPITAL ENCOUNTER (OUTPATIENT)
Dept: RADIOLOGY | Facility: HOSPITAL | Age: 47
Discharge: HOME | End: 2022-03-17
Attending: INTERNAL MEDICINE
Payer: COMMERCIAL

## 2022-03-17 DIAGNOSIS — Z78.0 MENOPAUSE: ICD-10-CM

## 2022-03-17 PROCEDURE — 77080 DXA BONE DENSITY AXIAL: CPT | Mod: TC

## 2022-03-17 PROCEDURE — 77080 DXA BONE DENSITY AXIAL: CPT | Mod: 26,,, | Performed by: RADIOLOGY

## 2022-03-17 PROCEDURE — 77080 DEXA BONE DENSITY SPINE HIP: ICD-10-PCS | Mod: 26,,, | Performed by: RADIOLOGY

## 2022-03-18 ENCOUNTER — OFFICE VISIT (OUTPATIENT)
Dept: HEMATOLOGY/ONCOLOGY | Facility: CLINIC | Age: 47
End: 2022-03-18
Payer: COMMERCIAL

## 2022-03-18 VITALS
OXYGEN SATURATION: 96 % | DIASTOLIC BLOOD PRESSURE: 91 MMHG | WEIGHT: 150.44 LBS | HEART RATE: 78 BPM | TEMPERATURE: 98 F | HEIGHT: 65 IN | SYSTOLIC BLOOD PRESSURE: 130 MMHG | BODY MASS INDEX: 25.07 KG/M2 | RESPIRATION RATE: 18 BRPM

## 2022-03-18 DIAGNOSIS — C50.212 MALIGNANT NEOPLASM OF UPPER-INNER QUADRANT OF LEFT FEMALE BREAST, UNSPECIFIED ESTROGEN RECEPTOR STATUS: Primary | ICD-10-CM

## 2022-03-18 DIAGNOSIS — C50.912 MALIGNANT NEOPLASM OF LEFT BREAST: Primary | ICD-10-CM

## 2022-03-18 DIAGNOSIS — Z79.811 USE OF ANASTROZOLE: ICD-10-CM

## 2022-03-18 DIAGNOSIS — Z90.710 HISTORY OF TOTAL ABDOMINAL HYSTERECTOMY: ICD-10-CM

## 2022-03-18 DIAGNOSIS — C77.9 SECONDARY ADENOCARCINOMA OF LYMPH NODE: ICD-10-CM

## 2022-03-18 DIAGNOSIS — M85.89 OSTEOPENIA OF MULTIPLE SITES: ICD-10-CM

## 2022-03-18 PROCEDURE — 3075F SYST BP GE 130 - 139MM HG: CPT | Mod: CPTII,S$GLB,, | Performed by: INTERNAL MEDICINE

## 2022-03-18 PROCEDURE — 3080F DIAST BP >= 90 MM HG: CPT | Mod: CPTII,S$GLB,, | Performed by: INTERNAL MEDICINE

## 2022-03-18 PROCEDURE — 99999 PR PBB SHADOW E&M-EST. PATIENT-LVL V: CPT | Mod: PBBFAC,,, | Performed by: INTERNAL MEDICINE

## 2022-03-18 PROCEDURE — 99205 OFFICE O/P NEW HI 60 MIN: CPT | Mod: S$GLB,,, | Performed by: INTERNAL MEDICINE

## 2022-03-18 PROCEDURE — 3008F BODY MASS INDEX DOCD: CPT | Mod: CPTII,S$GLB,, | Performed by: INTERNAL MEDICINE

## 2022-03-18 PROCEDURE — 3008F PR BODY MASS INDEX (BMI) DOCUMENTED: ICD-10-PCS | Mod: CPTII,S$GLB,, | Performed by: INTERNAL MEDICINE

## 2022-03-18 PROCEDURE — 99999 PR PBB SHADOW E&M-EST. PATIENT-LVL V: ICD-10-PCS | Mod: PBBFAC,,, | Performed by: INTERNAL MEDICINE

## 2022-03-18 PROCEDURE — 3080F PR MOST RECENT DIASTOLIC BLOOD PRESSURE >= 90 MM HG: ICD-10-PCS | Mod: CPTII,S$GLB,, | Performed by: INTERNAL MEDICINE

## 2022-03-18 PROCEDURE — 1159F PR MEDICATION LIST DOCUMENTED IN MEDICAL RECORD: ICD-10-PCS | Mod: CPTII,S$GLB,, | Performed by: INTERNAL MEDICINE

## 2022-03-18 PROCEDURE — 1159F MED LIST DOCD IN RCRD: CPT | Mod: CPTII,S$GLB,, | Performed by: INTERNAL MEDICINE

## 2022-03-18 PROCEDURE — 3075F PR MOST RECENT SYSTOLIC BLOOD PRESS GE 130-139MM HG: ICD-10-PCS | Mod: CPTII,S$GLB,, | Performed by: INTERNAL MEDICINE

## 2022-03-18 PROCEDURE — 99205 PR OFFICE/OUTPT VISIT, NEW, LEVL V, 60-74 MIN: ICD-10-PCS | Mod: S$GLB,,, | Performed by: INTERNAL MEDICINE

## 2022-03-18 NOTE — PROGRESS NOTES
CONSULT NOTE    Subjective:       Patient ID: Ksenia Arce is a 46 y.o. female.  MRN: [id number]  : 1975    Chief Complaint: Breast cancer     History of Present Illness:   Ksenia Arce is a 46 y.o. female who is referred for breast cancer. See Oncology history for details.     She is switching to [facility] for her care after a job change. She has been on Anastrozole since the end of .  Diagnosed in 2016, s/p neoadjuvant chemotherapy.   She had a left mastectomy with reconstruction with flap reconstruction, then underwent RT.     She is compliant with right mammograms, has been doing left mammograms with ultrasounds.     Left axilla heaviness and fullness off and on for about a month.     Had Dexa scan yesterday that showed normal Lumbar spine and borderline osteopenia of the femoral neck.     Most recent colonoscopy in .    Oncology History:    As per outside notes, Dr. Armstrong  [facility] upper inner quadrant of left breast [id number] stage IIIA, ER positive, IA positive, HER2 negative, dx in   Neoadjuvant chemotherapy ddAC followed by weekly taxol, final path showed residual disease in breast and / LN  Treated with adjuvant radiation  Initially treated with Lupron and Arimidex, started end of .  After GOPAL/BSO, Lupron was discontinued approximately 2018  This spring is 5 years of an AI. Discussed extending therapy. Considering her pathology, I think this is reasonable with follow up of bone density    FH: Paternal MONICA had breast cancer in her 70's     Genetics reportedly negative in     History:  Past Medical History:   Diagnosis Date    Breast cancer 2016    H/O total hysterectomy     Mental disorder     Migraine headache     Neuropathy       Past Surgical History:   Procedure Laterality Date    BREAST CAPSULECTOMY      BREAST RECONSTRUCTION      COLONOSCOPY N/A 2021    Procedure: COLONOSCOPY;  Surgeon: Diony Montes MD;   "Location: [facility];  Service: Endoscopy;  Laterality: N/A;    HYSTERECTOMY      MASTECTOMY Left     TONSILLECTOMY       Family History   Problem Relation Age of Onset    Heart disease Father     Clotting disorder Mother       Social History     Tobacco Use    Smoking status: Never Smoker    Smokeless tobacco: Never Used   Substance and Sexual Activity    Alcohol use: Yes    Drug use: Never    Sexual activity: Yes     Partners: Male        ROS:   Review of Systems   Constitutional: Negative for fever, malaise/fatigue and weight loss.   HENT: Negative for congestion, hearing loss, nosebleeds and sore throat.    Eyes: Negative for double vision and photophobia.   Respiratory: Negative for cough, hemoptysis, sputum production, shortness of breath and wheezing.    Cardiovascular: Negative for chest pain, palpitations, orthopnea and leg swelling.   Gastrointestinal: Negative for abdominal pain, blood in stool, constipation, diarrhea, heartburn, nausea and vomiting.   Genitourinary: Negative for dysuria, hematuria and urgency.   Musculoskeletal: Positive for joint pain. Negative for back pain and myalgias.   Skin: Negative for itching and rash.   Neurological: Negative for dizziness, tingling, seizures, weakness and headaches.   Endo/Heme/Allergies: Negative for polydipsia. Does not bruise/bleed easily.   Psychiatric/Behavioral: Negative for depression and memory loss. The patient is not nervous/anxious and does not have insomnia.         Objective:     Vitals:    03/18/22 1339   BP: (!) 130/91   Pulse: 78   Resp: 18   Temp: 98.1 °F (36.7 °C)   TempSrc: Oral   SpO2: 96%   Weight: 68.2 kg (150 lb 7.4 oz)   Height: 5' 5" (1.651 m)   PainSc: 0-No pain       Physical Examination:   Physical Exam  Vitals and nursing note reviewed.   Constitutional:       General: She is not in acute distress.     Appearance: She is not diaphoretic.   HENT:      Head: Normocephalic.      Mouth/Throat:      Pharynx: No oropharyngeal " exudate.   Eyes:      General: No scleral icterus.     Conjunctiva/sclera: Conjunctivae normal.   Neck:      Thyroid: No thyromegaly.   Cardiovascular:      Rate and Rhythm: Normal rate and regular rhythm.      Heart sounds: Normal heart sounds. No murmur heard.  Pulmonary:      Effort: Pulmonary effort is normal. No respiratory distress.      Breath sounds: No stridor. No wheezing or rales.   Chest:      Chest wall: No tenderness.   Abdominal:      General: Bowel sounds are normal. There is no distension.      Palpations: Abdomen is soft. There is no mass.      Tenderness: There is no abdominal tenderness. There is no rebound.   Musculoskeletal:         General: No tenderness or deformity. Normal range of motion.      Cervical back: Neck supple.   Lymphadenopathy:      Cervical: No cervical adenopathy.   Skin:     General: Skin is warm and dry.      Findings: No erythema or rash.      Comments: Left mastectomy with flap reconstruction. Right breast without mass. No axillary adenopathy   Neurological:      Mental Status: She is alert and oriented to person, place, and time.      Cranial Nerves: No cranial nerve deficit.      Coordination: Coordination normal.      Gait: Gait is intact.   Psychiatric:         Mood and Affect: Affect normal.         Cognition and Memory: Memory normal.         Judgment: Judgment normal.          Diagnostic Tests:  Significant Imaging: I have reviewed and interpreted all pertinent imaging results/findings.    Laboratory Data:  All pertinent labs have been reviewed.    Labs:   Lab Results   Component Value Date    WBC 5.52 01/10/2022    HGB 14.3 01/10/2022    HCT 42.5 01/10/2022    MCV 87 01/10/2022     01/10/2022       Assessment/Plan:   Malignant neoplasm of upper-inner quadrant of left female breast, unspecified estrogen receptor status  Secondary adenocarcinoma of lymph node  Use of anastrozole  ILC upper inner quadrant of left breast [id number] stage IIIA, ER positive, NJ  positive, HER2 negative, dx in 2016    Continue anastrozole, recommend 10 years of treatment as tolerated given node positive disease.   Obtain outside images and a copy of genetics and path.   Obtain baseline breast imaging, exam is benign today but she is concerned about left axillary fullness and I will review results and call her .     RTC 3 months.     -     Mammo Digital Diagnostic Bilat; Future; Expected date: 03/18/2022  -     US Breast Left Complete; Future; Expected date: 03/18/2022    History of total abdominal hysterectomy  Noted. Continue AI.     Osteopenia of multiple sites  Continue Prolia q 6 months. DEXA reviewed, repeat in 2 years.         ECOG SCORE    0 - Fully active-able to carry on all pre-disease performance without restriction           Discussion:   Follow up in about 3 months (around 6/18/2022) for MD.    Plan was discussed with the patient at length, and she verbalized understanding. Ksenia was given an opportunity to ask questions that were answered to her satisfaction, and she was advised to call in the interval if any problems or questions arise.    Electronically signed by Kandy Coles MD

## 2022-03-18 NOTE — Clinical Note
Mammogram and left breast ultrasound asap. Need outside imaging to compare from [facility].  Need to start prolia asap. Cbc, cmp to be done before prolia   RTC 3 months

## 2022-03-21 DIAGNOSIS — C50.912 MALIGNANT NEOPLASM OF LEFT BREAST: Primary | ICD-10-CM

## 2022-03-22 DIAGNOSIS — C50.212 MALIGNANT NEOPLASM OF UPPER-INNER QUADRANT OF LEFT FEMALE BREAST, UNSPECIFIED ESTROGEN RECEPTOR STATUS: Primary | ICD-10-CM

## 2022-03-23 DIAGNOSIS — Z79.811 USE OF ANASTROZOLE: Primary | ICD-10-CM

## 2022-03-24 ENCOUNTER — HOSPITAL ENCOUNTER (OUTPATIENT)
Dept: RADIOLOGY | Facility: HOSPITAL | Age: 47
Discharge: HOME | End: 2022-03-24
Attending: INTERNAL MEDICINE
Payer: COMMERCIAL

## 2022-03-24 DIAGNOSIS — C50.212 MALIGNANT NEOPLASM OF UPPER-INNER QUADRANT OF LEFT FEMALE BREAST, UNSPECIFIED ESTROGEN RECEPTOR STATUS: ICD-10-CM

## 2022-03-24 PROCEDURE — 77066 DX MAMMO INCL CAD BI: CPT | Mod: TC

## 2022-03-24 PROCEDURE — 76882 US LMTD JT/FCL EVL NVASC XTR: CPT | Mod: TC,LT

## 2022-03-24 PROCEDURE — 77062 MAMMO DIGITAL DIAGNOSTIC BILAT WITH TOMO: ICD-10-PCS | Mod: 26,,, | Performed by: RADIOLOGY

## 2022-03-24 PROCEDURE — 76882 US AXILLA ONLY (BREAST IMAGING) LEFT: ICD-10-PCS | Mod: 26,LT,, | Performed by: RADIOLOGY

## 2022-03-24 PROCEDURE — 76882 US LMTD JT/FCL EVL NVASC XTR: CPT | Mod: 26,LT,, | Performed by: RADIOLOGY

## 2022-03-24 PROCEDURE — 77066 MAMMO DIGITAL DIAGNOSTIC BILAT WITH TOMO: ICD-10-PCS | Mod: 26,,, | Performed by: RADIOLOGY

## 2022-03-24 PROCEDURE — 77066 DX MAMMO INCL CAD BI: CPT | Mod: 26,,, | Performed by: RADIOLOGY

## 2022-03-24 PROCEDURE — 77062 BREAST TOMOSYNTHESIS BI: CPT | Mod: 26,,, | Performed by: RADIOLOGY

## 2022-03-25 ENCOUNTER — TELEPHONE (OUTPATIENT)
Dept: HEMATOLOGY/ONCOLOGY | Facility: CLINIC | Age: 47
End: 2022-03-25
Payer: COMMERCIAL

## 2022-03-25 DIAGNOSIS — C50.212 MALIGNANT NEOPLASM OF UPPER-INNER QUADRANT OF LEFT FEMALE BREAST, UNSPECIFIED ESTROGEN RECEPTOR STATUS: Primary | ICD-10-CM

## 2022-03-30 ENCOUNTER — INFUSION (OUTPATIENT)
Dept: INFUSION THERAPY | Facility: HOSPITAL | Age: 47
End: 2022-03-30
Payer: COMMERCIAL

## 2022-03-30 DIAGNOSIS — R74.01 TRANSAMINITIS: Primary | ICD-10-CM

## 2022-03-30 DIAGNOSIS — M85.89 OSTEOPENIA OF MULTIPLE SITES: Primary | ICD-10-CM

## 2022-03-30 PROCEDURE — 96372 THER/PROPH/DIAG INJ SC/IM: CPT

## 2022-03-30 PROCEDURE — 63600175 PHARM REV CODE 636 W HCPCS: Mod: JG | Performed by: INTERNAL MEDICINE

## 2022-03-30 RX ADMIN — DENOSUMAB 60 MG: 60 INJECTION SUBCUTANEOUS at 12:03

## 2022-03-31 ENCOUNTER — PATIENT MESSAGE (OUTPATIENT)
Dept: HEMATOLOGY/ONCOLOGY | Facility: CLINIC | Age: 47
End: 2022-03-31
Payer: COMMERCIAL

## 2022-04-08 ENCOUNTER — TELEPHONE (OUTPATIENT)
Dept: HEMATOLOGY/ONCOLOGY | Facility: CLINIC | Age: 47
End: 2022-04-08
Payer: COMMERCIAL

## 2022-04-08 NOTE — TELEPHONE ENCOUNTER
Called patient to discuss mild transaminatis. She is asymptomatic, will obtain baseline abdominal ultrasound and further work up as clinically indicated. Repeat cmp in June, MRI can be scheduled for June as well. She verbalized understanding.

## 2022-05-13 ENCOUNTER — PATIENT MESSAGE (OUTPATIENT)
Dept: INTERNAL MEDICINE | Facility: CLINIC | Age: 47
End: 2022-05-13
Payer: COMMERCIAL

## 2022-05-19 RX ORDER — ATORVASTATIN CALCIUM 40 MG/1
40 TABLET, FILM COATED ORAL DAILY
Qty: 90 TABLET | Refills: 1
Start: 2022-05-19 | End: 2022-10-14 | Stop reason: SDUPTHER

## 2022-05-19 NOTE — TELEPHONE ENCOUNTER
No new care gaps identified.  [facility] Embedded Care Gaps. Reference number: [id number]. 5/19/2022   10:53:05 AM ALBAN

## 2022-06-02 ENCOUNTER — PATIENT MESSAGE (OUTPATIENT)
Dept: INTERNAL MEDICINE | Facility: CLINIC | Age: 47
End: 2022-06-02
Payer: COMMERCIAL

## 2022-06-03 ENCOUNTER — PATIENT MESSAGE (OUTPATIENT)
Dept: INTERNAL MEDICINE | Facility: CLINIC | Age: 47
End: 2022-06-03
Payer: COMMERCIAL

## 2022-06-22 ENCOUNTER — TELEPHONE (OUTPATIENT)
Dept: NEUROLOGY | Facility: CLINIC | Age: 47
End: 2022-06-22
Payer: COMMERCIAL

## 2022-06-24 ENCOUNTER — OFFICE VISIT (OUTPATIENT)
Dept: HEMATOLOGY/ONCOLOGY | Facility: CLINIC | Age: 47
End: 2022-06-24
Payer: COMMERCIAL

## 2022-06-24 ENCOUNTER — LAB VISIT (OUTPATIENT)
Dept: LAB | Facility: HOSPITAL | Age: 47
End: 2022-06-24
Attending: INTERNAL MEDICINE
Payer: COMMERCIAL

## 2022-06-24 VITALS
HEART RATE: 80 BPM | HEIGHT: 65 IN | SYSTOLIC BLOOD PRESSURE: 108 MMHG | TEMPERATURE: 98 F | OXYGEN SATURATION: 96 % | DIASTOLIC BLOOD PRESSURE: 72 MMHG | WEIGHT: 153.13 LBS | BODY MASS INDEX: 25.51 KG/M2 | RESPIRATION RATE: 18 BRPM

## 2022-06-24 DIAGNOSIS — C77.9 SECONDARY ADENOCARCINOMA OF LYMPH NODE: ICD-10-CM

## 2022-06-24 DIAGNOSIS — T45.1X5A PERIPHERAL NEUROPATHY DUE TO CHEMOTHERAPY: ICD-10-CM

## 2022-06-24 DIAGNOSIS — C50.212 MALIGNANT NEOPLASM OF UPPER-INNER QUADRANT OF LEFT FEMALE BREAST, UNSPECIFIED ESTROGEN RECEPTOR STATUS: Primary | ICD-10-CM

## 2022-06-24 DIAGNOSIS — C50.212 MALIGNANT NEOPLASM OF UPPER-INNER QUADRANT OF LEFT FEMALE BREAST, UNSPECIFIED ESTROGEN RECEPTOR STATUS: ICD-10-CM

## 2022-06-24 DIAGNOSIS — M85.89 OSTEOPENIA OF MULTIPLE SITES: ICD-10-CM

## 2022-06-24 DIAGNOSIS — G62.0 PERIPHERAL NEUROPATHY DUE TO CHEMOTHERAPY: ICD-10-CM

## 2022-06-24 DIAGNOSIS — Z79.811 USE OF ANASTROZOLE: ICD-10-CM

## 2022-06-24 LAB
ALBUMIN SERPL BCP-MCNC: 4.1 G/DL (ref 3.5–5.2)
ALP SERPL-CCNC: 83 U/L (ref 55–135)
ALT SERPL W/O P-5'-P-CCNC: 44 U/L (ref 10–44)
ANION GAP SERPL CALC-SCNC: 7 MMOL/L (ref 8–16)
AST SERPL-CCNC: 27 U/L (ref 10–40)
BASOPHILS # BLD AUTO: 0.03 K/UL (ref 0–0.2)
BASOPHILS NFR BLD: 0.6 % (ref 0–1.9)
BILIRUB SERPL-MCNC: 0.8 MG/DL (ref 0.1–1)
BUN SERPL-MCNC: 17 MG/DL (ref 6–20)
CALCIUM SERPL-MCNC: 10 MG/DL (ref 8.7–10.5)
CHLORIDE SERPL-SCNC: 105 MMOL/L (ref 95–110)
CO2 SERPL-SCNC: 25 MMOL/L (ref 23–29)
CREAT SERPL-MCNC: 0.7 MG/DL (ref 0.5–1.4)
DIFFERENTIAL METHOD: NORMAL
EOSINOPHIL # BLD AUTO: 0.3 K/UL (ref 0–0.5)
EOSINOPHIL NFR BLD: 5.5 % (ref 0–8)
ERYTHROCYTE [DISTWIDTH] IN BLOOD BY AUTOMATED COUNT: 12.4 % (ref 11.5–14.5)
EST. GFR  (AFRICAN AMERICAN): >60 ML/MIN/1.73 M^2
EST. GFR  (NON AFRICAN AMERICAN): >60 ML/MIN/1.73 M^2
GLUCOSE SERPL-MCNC: 97 MG/DL (ref 70–110)
HCT VFR BLD AUTO: 42.7 % (ref 37–48.5)
HGB BLD-MCNC: 14.3 G/DL (ref 12–16)
IMM GRANULOCYTES # BLD AUTO: 0.01 K/UL (ref 0–0.04)
IMM GRANULOCYTES NFR BLD AUTO: 0.2 % (ref 0–0.5)
LYMPHOCYTES # BLD AUTO: 1.4 K/UL (ref 1–4.8)
LYMPHOCYTES NFR BLD: 30 % (ref 18–48)
MCH RBC QN AUTO: 29.2 PG (ref 27–31)
MCHC RBC AUTO-ENTMCNC: 33.5 G/DL (ref 32–36)
MCV RBC AUTO: 87 FL (ref 82–98)
MONOCYTES # BLD AUTO: 0.4 K/UL (ref 0.3–1)
MONOCYTES NFR BLD: 8.6 % (ref 4–15)
NEUTROPHILS # BLD AUTO: 2.6 K/UL (ref 1.8–7.7)
NEUTROPHILS NFR BLD: 55.1 % (ref 38–73)
NRBC BLD-RTO: 0 /100 WBC
PLATELET # BLD AUTO: 235 K/UL (ref 150–450)
PMV BLD AUTO: 9.4 FL (ref 9.2–12.9)
POTASSIUM SERPL-SCNC: 4.7 MMOL/L (ref 3.5–5.1)
PROT SERPL-MCNC: 7.1 G/DL (ref 6–8.4)
RBC # BLD AUTO: 4.9 M/UL (ref 4–5.4)
SODIUM SERPL-SCNC: 137 MMOL/L (ref 136–145)
WBC # BLD AUTO: 4.77 K/UL (ref 3.9–12.7)

## 2022-06-24 PROCEDURE — 1159F MED LIST DOCD IN RCRD: CPT | Mod: CPTII,S$GLB,, | Performed by: INTERNAL MEDICINE

## 2022-06-24 PROCEDURE — 3008F BODY MASS INDEX DOCD: CPT | Mod: CPTII,S$GLB,, | Performed by: INTERNAL MEDICINE

## 2022-06-24 PROCEDURE — 36415 COLL VENOUS BLD VENIPUNCTURE: CPT | Performed by: INTERNAL MEDICINE

## 2022-06-24 PROCEDURE — 99999 PR PBB SHADOW E&M-EST. PATIENT-LVL IV: ICD-10-PCS | Mod: PBBFAC,,, | Performed by: INTERNAL MEDICINE

## 2022-06-24 PROCEDURE — 85025 COMPLETE CBC W/AUTO DIFF WBC: CPT | Performed by: INTERNAL MEDICINE

## 2022-06-24 PROCEDURE — 99215 PR OFFICE/OUTPT VISIT, EST, LEVL V, 40-54 MIN: ICD-10-PCS | Mod: S$GLB,,, | Performed by: INTERNAL MEDICINE

## 2022-06-24 PROCEDURE — 3078F PR MOST RECENT DIASTOLIC BLOOD PRESSURE < 80 MM HG: ICD-10-PCS | Mod: CPTII,S$GLB,, | Performed by: INTERNAL MEDICINE

## 2022-06-24 PROCEDURE — 3078F DIAST BP <80 MM HG: CPT | Mod: CPTII,S$GLB,, | Performed by: INTERNAL MEDICINE

## 2022-06-24 PROCEDURE — 1159F PR MEDICATION LIST DOCUMENTED IN MEDICAL RECORD: ICD-10-PCS | Mod: CPTII,S$GLB,, | Performed by: INTERNAL MEDICINE

## 2022-06-24 PROCEDURE — 3074F SYST BP LT 130 MM HG: CPT | Mod: CPTII,S$GLB,, | Performed by: INTERNAL MEDICINE

## 2022-06-24 PROCEDURE — 80053 COMPREHEN METABOLIC PANEL: CPT | Performed by: INTERNAL MEDICINE

## 2022-06-24 PROCEDURE — 3008F PR BODY MASS INDEX (BMI) DOCUMENTED: ICD-10-PCS | Mod: CPTII,S$GLB,, | Performed by: INTERNAL MEDICINE

## 2022-06-24 PROCEDURE — 3074F PR MOST RECENT SYSTOLIC BLOOD PRESSURE < 130 MM HG: ICD-10-PCS | Mod: CPTII,S$GLB,, | Performed by: INTERNAL MEDICINE

## 2022-06-24 PROCEDURE — 99999 PR PBB SHADOW E&M-EST. PATIENT-LVL IV: CPT | Mod: PBBFAC,,, | Performed by: INTERNAL MEDICINE

## 2022-06-24 PROCEDURE — 99215 OFFICE O/P EST HI 40 MIN: CPT | Mod: S$GLB,,, | Performed by: INTERNAL MEDICINE

## 2022-06-24 NOTE — PROGRESS NOTES
PROGRESS NOTE    Subjective:       Patient ID: Ksenia Arce is a 46 y.o. female.  MRN: [id number]  : 1975    Chief Complaint: Breast cancer     History of Present Illness:   Ksenia Arce is a 46 y.o. female who presents with  breast cancer. See Oncology history for details.      She has been on Anastrozole since the end of .  Diagnosed in 2016, s/p neoadjuvant chemotherapy.   She had a left mastectomy with reconstruction with flap reconstruction, then underwent RT.      She is compliant with right mammograms, has been doing left mammograms with ultrasounds.      She reports mild lower back pain and some neuropathic pain along the surgical incisions in the lower abdomen. Symptoms are mostly during the day, self limited.      Most recent colonoscopy in .       Oncology History:     As per outside notes, Dr. Armstrong  [facility] upper inner quadrant of left breast [id number] stage IIIA, ER positive, ND positive, HER2 negative, dx in   Neoadjuvant chemotherapy ddAC followed by weekly taxol, final path showed residual disease in breast and  LN  Treated with adjuvant radiation  Initially treated with Lupron and Arimidex, started end of .  After GOPAL/BSO, Lupron was discontinued approximately 2018  This spring is 5 years of an AI. Discussed extending therapy. Considering her pathology, I think this is reasonable with follow up of bone density     FH: Paternal GM had breast cancer in her 70's      Genetics reportedly negative in   Oncology History   Malignant neoplasm of left breast   2020 Initial Diagnosis    Malignant neoplasm of left breast     3/18/2022 Cancer Staged    Staging form: Breast, AJCC 8th Edition  - Clinical: cT2, cN2, cM0, ER+, ND+, HER2-         History:  Past Medical History:   Diagnosis Date    Breast cancer 2016    H/O total hysterectomy     Mental disorder     Migraine headache     Neuropathy       Past  "Surgical History:   Procedure Laterality Date    BREAST CAPSULECTOMY      BREAST RECONSTRUCTION      COLONOSCOPY N/A 9/17/2021    Procedure: COLONOSCOPY;  Surgeon: Diony Montes MD;  Location: [facility];  Service: Endoscopy;  Laterality: N/A;    HYSTERECTOMY      MASTECTOMY Left     TONSILLECTOMY       Family History   Problem Relation Age of Onset    Heart disease Father     Clotting disorder Mother      Social History     Tobacco Use    Smoking status: Never Smoker    Smokeless tobacco: Never Used   Substance and Sexual Activity    Alcohol use: Yes    Drug use: Never    Sexual activity: Yes     Partners: Male        ROS:   Review of Systems   Constitutional: Negative for fever, malaise/fatigue and weight loss.   HENT: Negative for congestion, hearing loss, nosebleeds and sore throat.    Eyes: Negative for double vision and photophobia.   Respiratory: Negative for cough, hemoptysis, sputum production, shortness of breath and wheezing.    Cardiovascular: Negative for chest pain, palpitations, orthopnea and leg swelling.   Gastrointestinal: Negative for abdominal pain, blood in stool, constipation, diarrhea, heartburn, nausea and vomiting.   Genitourinary: Negative for dysuria, hematuria and urgency.   Musculoskeletal: Positive for back pain. Negative for joint pain and myalgias.   Skin: Negative for itching and rash.   Neurological: Positive for sensory change and headaches. Negative for dizziness, tingling, seizures and weakness.   Endo/Heme/Allergies: Negative for polydipsia. Does not bruise/bleed easily.   Psychiatric/Behavioral: Negative for depression and memory loss. The patient is nervous/anxious. The patient does not have insomnia.         Objective:     Vitals:    06/24/22 1305   BP: 108/72   Pulse: 80   Resp: 18   Temp: 97.9 °F (36.6 °C)   TempSrc: Oral   SpO2: 96%   Weight: 69.4 kg (153 lb 1.8 oz)   Height: 5' 5" (1.651 m)   PainSc:   4     Wt Readings from Last 10 Encounters:   06/24/22 " 69.4 kg (153 lb 1.8 oz)   03/18/22 68.2 kg (150 lb 7.4 oz)   01/28/22 69 kg (152 lb 1.9 oz)   01/10/22 69.8 kg (153 lb 14.1 oz)   09/17/21 68 kg (150 lb)   08/17/21 69.1 kg (152 lb 5.4 oz)   07/28/21 71.5 kg (157 lb 8.3 oz)   06/24/21 70.5 kg (155 lb 5 oz)   02/23/21 71.6 kg (157 lb 15.4 oz)   01/28/21 71.6 kg (157 lb 13.6 oz)       Physical Examination:   Physical Exam  Vitals and nursing note reviewed.   Constitutional:       General: She is not in acute distress.     Appearance: She is not diaphoretic.   HENT:      Head: Normocephalic.      Mouth/Throat:      Pharynx: No oropharyngeal exudate.   Eyes:      General: No scleral icterus.     Conjunctiva/sclera: Conjunctivae normal.   Neck:      Thyroid: No thyromegaly.   Cardiovascular:      Rate and Rhythm: Normal rate and regular rhythm.      Heart sounds: Normal heart sounds. No murmur heard.  Pulmonary:      Effort: Pulmonary effort is normal. No respiratory distress.      Breath sounds: No stridor. No wheezing or rales.   Chest:      Chest wall: No tenderness.   Abdominal:      General: Bowel sounds are normal. There is no distension.      Palpations: Abdomen is soft. There is no mass.      Tenderness: There is no abdominal tenderness. There is no rebound.   Musculoskeletal:         General: No tenderness or deformity. Normal range of motion.      Cervical back: Neck supple.   Lymphadenopathy:      Cervical: No cervical adenopathy.   Skin:     General: Skin is warm and dry.      Findings: No erythema or rash.      Comments: Right mastectomy with flap reconstruction. No breast mass or axillary adenopathy bilaterally    Neurological:      Mental Status: She is alert and oriented to person, place, and time.      Cranial Nerves: No cranial nerve deficit.      Coordination: Coordination normal.      Gait: Gait is intact.   Psychiatric:         Mood and Affect: Affect normal.         Cognition and Memory: Memory normal.         Judgment: Judgment normal.           Diagnostic Tests:  Significant Imaging: I have reviewed and interpreted all pertinent imaging results/findings.    Laboratory Data:  All pertinent labs have been reviewed.  Labs:   Lab Results   Component Value Date    WBC 4.77 06/24/2022    RBC 4.90 06/24/2022    HGB 14.3 06/24/2022    HCT 42.7 06/24/2022    MCV 87 06/24/2022     06/24/2022    GLU 97 06/24/2022     06/24/2022    K 4.7 06/24/2022    BUN 17 06/24/2022    CREATININE 0.7 06/24/2022    AST 27 06/24/2022    ALT 44 06/24/2022    BILITOT 0.8 06/24/2022       Assessment/Plan:   Malignant neoplasm of upper-inner quadrant of left female breast, unspecified estrogen receptor status  Secondary adenocarcinoma of lymph node  Use of anastrozole    ILC upper inner quadrant of left breast [id number] stage IIIA, ER positive, [place] positive, HER2 negative, dx in 2016     Continue anastrozole, recommend 10 years of treatment as tolerated given node positive disease.   Continue active surveillance. Mammogram right breast was normal in March. I have discussed with Dr. Pérez, breast radiology, who is recommending MRI for high risk disease. MRI and mammogram can be alternated every 6 months. I will see her in September after her MRI.      RTC 3 months.      -     CBC Auto Differential; Standing  -     Comprehensive Metabolic Panel; Standing  -     MRI Breast w/wo Contrast, w/CAD, Bilateral; Future; Expected date: 06/24/2022    Osteopenia of multiple sites  Continue Prolia q 6 months.     Peripheral neuropathy due to chemotherapy  Recent worsening. Increase gabapentin to 300 mg po bid, currently only taking at night. RTC earlier in case of worsening back pain, will send for imaging as needed.      ECOG SCORE    0 - Fully active-able to carry on all pre-disease performance without restriction           Discussion:   No follow-ups on file.    Plan was discussed with the patient at length, and she verbalized understanding. Ksenia was given an opportunity to ask  questions that were answered to her satisfaction, and she was advised to call in the interval if any problems or questions arise.    Electronically signed by Kandy Coles MD        Route Chart for Scheduling    Med Onc Chart Routing      Follow up with physician . 9/30, see me, labs and Prolia same day    Follow up with ANNE    Infusion scheduling note    Injection scheduling note    Labs CMP and CBC   Lab interval:  9/30   Imaging MRI   Breast MRI the week of 9/24, before my visit    Pharmacy appointment    Other referrals            Therapy Plan Information  Medications  denosumab (PROLIA) injection 60 mg  60 mg, Subcutaneous, Every 26 weeks

## 2022-07-01 ENCOUNTER — PATIENT MESSAGE (OUTPATIENT)
Dept: INTERNAL MEDICINE | Facility: CLINIC | Age: 47
End: 2022-07-01
Payer: COMMERCIAL

## 2022-07-01 RX ORDER — MELOXICAM 7.5 MG/1
7.5 TABLET ORAL DAILY
Qty: 10 TABLET | Refills: 1 | Status: SHIPPED | OUTPATIENT
Start: 2022-07-01 | End: 2023-02-09 | Stop reason: ALTCHOICE

## 2022-07-01 RX ORDER — CYCLOBENZAPRINE HCL 10 MG
10 TABLET ORAL 3 TIMES DAILY PRN
Qty: 30 TABLET | Refills: 0 | Status: SHIPPED | OUTPATIENT
Start: 2022-07-01 | End: 2022-07-11

## 2022-07-05 ENCOUNTER — TELEPHONE (OUTPATIENT)
Dept: HEMATOLOGY/ONCOLOGY | Facility: CLINIC | Age: 47
End: 2022-07-05
Payer: COMMERCIAL

## 2022-09-23 ENCOUNTER — HOSPITAL ENCOUNTER (OUTPATIENT)
Dept: RADIOLOGY | Facility: HOSPITAL | Age: 47
Discharge: HOME | End: 2022-09-23
Attending: INTERNAL MEDICINE
Payer: COMMERCIAL

## 2022-09-23 DIAGNOSIS — C50.212 MALIGNANT NEOPLASM OF UPPER-INNER QUADRANT OF LEFT FEMALE BREAST, UNSPECIFIED ESTROGEN RECEPTOR STATUS: ICD-10-CM

## 2022-09-23 PROCEDURE — 77049 MRI BREAST C-+ W/CAD BI: CPT | Mod: TC

## 2022-09-23 PROCEDURE — 77049 MRI BREAST W/WO CONTRAST, W/CAD, BILATERAL: ICD-10-PCS | Mod: 26,,, | Performed by: RADIOLOGY

## 2022-09-23 PROCEDURE — A9577 INJ MULTIHANCE: HCPCS | Performed by: INTERNAL MEDICINE

## 2022-09-23 PROCEDURE — 77049 MRI BREAST C-+ W/CAD BI: CPT | Mod: 26,,, | Performed by: RADIOLOGY

## 2022-09-23 PROCEDURE — 25500020 PHARM REV CODE 255: Performed by: INTERNAL MEDICINE

## 2022-09-23 RX ADMIN — GADOBENATE DIMEGLUMINE 15 ML: 529 INJECTION, SOLUTION INTRAVENOUS at 11:09

## 2022-09-30 ENCOUNTER — OFFICE VISIT (OUTPATIENT)
Dept: HEMATOLOGY/ONCOLOGY | Facility: CLINIC | Age: 47
End: 2022-09-30
Payer: COMMERCIAL

## 2022-09-30 ENCOUNTER — INFUSION (OUTPATIENT)
Dept: INFUSION THERAPY | Facility: HOSPITAL | Age: 47
End: 2022-09-30
Attending: INTERNAL MEDICINE
Payer: COMMERCIAL

## 2022-09-30 ENCOUNTER — LAB VISIT (OUTPATIENT)
Dept: LAB | Facility: HOSPITAL | Age: 47
End: 2022-09-30
Attending: INTERNAL MEDICINE
Payer: COMMERCIAL

## 2022-09-30 VITALS
TEMPERATURE: 98 F | OXYGEN SATURATION: 97 % | DIASTOLIC BLOOD PRESSURE: 77 MMHG | RESPIRATION RATE: 18 BRPM | SYSTOLIC BLOOD PRESSURE: 120 MMHG | HEART RATE: 72 BPM | WEIGHT: 153.25 LBS | HEIGHT: 65 IN | BODY MASS INDEX: 25.53 KG/M2

## 2022-09-30 DIAGNOSIS — M85.89 OSTEOPENIA OF MULTIPLE SITES: Primary | ICD-10-CM

## 2022-09-30 DIAGNOSIS — C77.9 SECONDARY ADENOCARCINOMA OF LYMPH NODE: ICD-10-CM

## 2022-09-30 DIAGNOSIS — Z79.811 USE OF ANASTROZOLE: ICD-10-CM

## 2022-09-30 DIAGNOSIS — M85.89 OSTEOPENIA OF MULTIPLE SITES: ICD-10-CM

## 2022-09-30 DIAGNOSIS — T45.1X5A PERIPHERAL NEUROPATHY DUE TO CHEMOTHERAPY: ICD-10-CM

## 2022-09-30 DIAGNOSIS — G62.0 PERIPHERAL NEUROPATHY DUE TO CHEMOTHERAPY: ICD-10-CM

## 2022-09-30 DIAGNOSIS — C50.212 MALIGNANT NEOPLASM OF UPPER-INNER QUADRANT OF LEFT FEMALE BREAST, UNSPECIFIED ESTROGEN RECEPTOR STATUS: ICD-10-CM

## 2022-09-30 DIAGNOSIS — C50.212 MALIGNANT NEOPLASM OF UPPER-INNER QUADRANT OF LEFT FEMALE BREAST, UNSPECIFIED ESTROGEN RECEPTOR STATUS: Primary | ICD-10-CM

## 2022-09-30 LAB
ALBUMIN SERPL BCP-MCNC: 4.3 G/DL (ref 3.5–5.2)
ALP SERPL-CCNC: 79 U/L (ref 55–135)
ALT SERPL W/O P-5'-P-CCNC: 44 U/L (ref 10–44)
ANION GAP SERPL CALC-SCNC: 9 MMOL/L (ref 8–16)
AST SERPL-CCNC: 24 U/L (ref 10–40)
BASOPHILS # BLD AUTO: 0.03 K/UL (ref 0–0.2)
BASOPHILS NFR BLD: 0.5 % (ref 0–1.9)
BILIRUB SERPL-MCNC: 0.8 MG/DL (ref 0.1–1)
BUN SERPL-MCNC: 16 MG/DL (ref 6–20)
CALCIUM SERPL-MCNC: 10 MG/DL (ref 8.7–10.5)
CHLORIDE SERPL-SCNC: 105 MMOL/L (ref 95–110)
CO2 SERPL-SCNC: 26 MMOL/L (ref 23–29)
CREAT SERPL-MCNC: 0.7 MG/DL (ref 0.5–1.4)
DIFFERENTIAL METHOD: NORMAL
EOSINOPHIL # BLD AUTO: 0.3 K/UL (ref 0–0.5)
EOSINOPHIL NFR BLD: 4.8 % (ref 0–8)
ERYTHROCYTE [DISTWIDTH] IN BLOOD BY AUTOMATED COUNT: 12.2 % (ref 11.5–14.5)
EST. GFR  (NO RACE VARIABLE): >60 ML/MIN/1.73 M^2
GLUCOSE SERPL-MCNC: 88 MG/DL (ref 70–110)
HCT VFR BLD AUTO: 41.6 % (ref 37–48.5)
HGB BLD-MCNC: 14.3 G/DL (ref 12–16)
IMM GRANULOCYTES # BLD AUTO: 0.01 K/UL (ref 0–0.04)
IMM GRANULOCYTES NFR BLD AUTO: 0.2 % (ref 0–0.5)
LYMPHOCYTES # BLD AUTO: 1.3 K/UL (ref 1–4.8)
LYMPHOCYTES NFR BLD: 23.6 % (ref 18–48)
MCH RBC QN AUTO: 29.9 PG (ref 27–31)
MCHC RBC AUTO-ENTMCNC: 34.4 G/DL (ref 32–36)
MCV RBC AUTO: 87 FL (ref 82–98)
MONOCYTES # BLD AUTO: 0.5 K/UL (ref 0.3–1)
MONOCYTES NFR BLD: 8.8 % (ref 4–15)
NEUTROPHILS # BLD AUTO: 3.5 K/UL (ref 1.8–7.7)
NEUTROPHILS NFR BLD: 62.1 % (ref 38–73)
NRBC BLD-RTO: 0 /100 WBC
PLATELET # BLD AUTO: 244 K/UL (ref 150–450)
PMV BLD AUTO: 9.8 FL (ref 9.2–12.9)
POTASSIUM SERPL-SCNC: 4.3 MMOL/L (ref 3.5–5.1)
PROT SERPL-MCNC: 7.3 G/DL (ref 6–8.4)
RBC # BLD AUTO: 4.79 M/UL (ref 4–5.4)
SODIUM SERPL-SCNC: 140 MMOL/L (ref 136–145)
WBC # BLD AUTO: 5.67 K/UL (ref 3.9–12.7)

## 2022-09-30 PROCEDURE — 96372 THER/PROPH/DIAG INJ SC/IM: CPT

## 2022-09-30 PROCEDURE — 3008F BODY MASS INDEX DOCD: CPT | Mod: CPTII,S$GLB,, | Performed by: INTERNAL MEDICINE

## 2022-09-30 PROCEDURE — 85025 COMPLETE CBC W/AUTO DIFF WBC: CPT | Performed by: INTERNAL MEDICINE

## 2022-09-30 PROCEDURE — 99214 OFFICE O/P EST MOD 30 MIN: CPT | Mod: S$GLB,,, | Performed by: INTERNAL MEDICINE

## 2022-09-30 PROCEDURE — 3008F PR BODY MASS INDEX (BMI) DOCUMENTED: ICD-10-PCS | Mod: CPTII,S$GLB,, | Performed by: INTERNAL MEDICINE

## 2022-09-30 PROCEDURE — 63600175 PHARM REV CODE 636 W HCPCS: Mod: JG | Performed by: INTERNAL MEDICINE

## 2022-09-30 PROCEDURE — 36415 COLL VENOUS BLD VENIPUNCTURE: CPT | Performed by: INTERNAL MEDICINE

## 2022-09-30 PROCEDURE — 1159F MED LIST DOCD IN RCRD: CPT | Mod: CPTII,S$GLB,, | Performed by: INTERNAL MEDICINE

## 2022-09-30 PROCEDURE — 99214 PR OFFICE/OUTPT VISIT, EST, LEVL IV, 30-39 MIN: ICD-10-PCS | Mod: S$GLB,,, | Performed by: INTERNAL MEDICINE

## 2022-09-30 PROCEDURE — 1159F PR MEDICATION LIST DOCUMENTED IN MEDICAL RECORD: ICD-10-PCS | Mod: CPTII,S$GLB,, | Performed by: INTERNAL MEDICINE

## 2022-09-30 PROCEDURE — 3074F SYST BP LT 130 MM HG: CPT | Mod: CPTII,S$GLB,, | Performed by: INTERNAL MEDICINE

## 2022-09-30 PROCEDURE — 3078F PR MOST RECENT DIASTOLIC BLOOD PRESSURE < 80 MM HG: ICD-10-PCS | Mod: CPTII,S$GLB,, | Performed by: INTERNAL MEDICINE

## 2022-09-30 PROCEDURE — 80053 COMPREHEN METABOLIC PANEL: CPT | Performed by: INTERNAL MEDICINE

## 2022-09-30 PROCEDURE — 99999 PR PBB SHADOW E&M-EST. PATIENT-LVL V: ICD-10-PCS | Mod: PBBFAC,,, | Performed by: INTERNAL MEDICINE

## 2022-09-30 PROCEDURE — 3074F PR MOST RECENT SYSTOLIC BLOOD PRESSURE < 130 MM HG: ICD-10-PCS | Mod: CPTII,S$GLB,, | Performed by: INTERNAL MEDICINE

## 2022-09-30 PROCEDURE — 3078F DIAST BP <80 MM HG: CPT | Mod: CPTII,S$GLB,, | Performed by: INTERNAL MEDICINE

## 2022-09-30 PROCEDURE — 99999 PR PBB SHADOW E&M-EST. PATIENT-LVL V: CPT | Mod: PBBFAC,,, | Performed by: INTERNAL MEDICINE

## 2022-09-30 RX ORDER — ANASTROZOLE 1 MG/1
1 TABLET ORAL DAILY
Qty: 90 TABLET | Refills: 6 | Status: SHIPPED | OUTPATIENT
Start: 2022-09-30 | End: 2023-05-05 | Stop reason: SDUPTHER

## 2022-09-30 RX ADMIN — DENOSUMAB 60 MG: 60 INJECTION SUBCUTANEOUS at 01:09

## 2022-09-30 NOTE — PROGRESS NOTES
PROGRESS NOTE    Subjective:       Patient ID: Ksenia Arce is a 46 y.o. female.  MRN: [id number]  : 1975    Chief Complaint: Left Breast cancer     History of Present Illness:   Ksenia Arce is a 46 y.o. female who presents with  breast cancer. See Oncology history for details.      She has been on Anastrozole since the end of .  Diagnosed in 2016, s/p neoadjuvant chemotherapy.   She had a left mastectomy with reconstruction with flap reconstruction, then underwent RT.    Interim history:  She presents today to discuss symptoms, labs and recent MRI. Has stable neuropathy and arthralgias.      She had a recent CT chest abd pelvis when she was having abdominal pain. There were no abnormal findings and her symptoms have resolved.      Most recent colonoscopy in .     Oncology History:     As per outside notes, Dr. Armstrong  [facility] upper inner quadrant of left breast [id number] stage IIIA, ER positive, MN positive, HER2 negative, dx in   Neoadjuvant chemotherapy ddAC followed by weekly taxol, final path showed residual disease in breast and  LN  Treated with adjuvant radiation  Initially treated with Lupron and Arimidex, started end of .  After GOPAL/BSO, Lupron was discontinued approximately 2018  This spring is 5 years of an AI. Discussed extending therapy. Considering her pathology, I think this is reasonable with follow up of bone density     FH: Paternal GM had breast cancer in her 70's      Genetics reportedly negative in   Oncology History   Malignant neoplasm of left breast   2020 Initial Diagnosis    Malignant neoplasm of left breast     3/18/2022 Cancer Staged    Staging form: Breast, AJCC 8th Edition  - Clinical: cT2, cN2, cM0, ER+, MN+, HER2-           History:  Past Medical History:   Diagnosis Date    Breast cancer 2016    H/O total hysterectomy     Mental disorder     Migraine headache     Neuropathy       Past  "Surgical History:   Procedure Laterality Date    BREAST CAPSULECTOMY      BREAST RECONSTRUCTION      COLONOSCOPY N/A 9/17/2021    Procedure: COLONOSCOPY;  Surgeon: Diony Montes MD;  Location: [facility];  Service: Endoscopy;  Laterality: N/A;    HYSTERECTOMY      MASTECTOMY Left     TONSILLECTOMY       Family History   Problem Relation Age of Onset    Heart disease Father     Clotting disorder Mother      Social History     Tobacco Use    Smoking status: Never    Smokeless tobacco: Never   Substance and Sexual Activity    Alcohol use: Yes    Drug use: Never    Sexual activity: Yes     Partners: Male        ROS:   Review of Systems   Constitutional:  Negative for fever, malaise/fatigue and weight loss.   HENT:  Negative for congestion, hearing loss, nosebleeds and sore throat.    Eyes:  Negative for double vision and photophobia.   Respiratory:  Negative for cough, hemoptysis, sputum production, shortness of breath and wheezing.    Cardiovascular:  Negative for chest pain, palpitations, orthopnea and leg swelling.   Gastrointestinal:  Negative for abdominal pain, blood in stool, constipation, diarrhea, heartburn, nausea and vomiting.   Genitourinary:  Negative for dysuria, hematuria and urgency.   Musculoskeletal:  Positive for back pain. Negative for joint pain and myalgias.   Skin:  Negative for itching and rash.   Neurological:  Positive for sensory change. Negative for dizziness, tingling, seizures, weakness and headaches.   Endo/Heme/Allergies:  Negative for polydipsia. Does not bruise/bleed easily.   Psychiatric/Behavioral:  Negative for depression and memory loss. The patient is nervous/anxious. The patient does not have insomnia.       Objective:     Vitals:    09/30/22 1239   BP: 120/77   Pulse: 72   Resp: 18   Temp: 98.2 °F (36.8 °C)   TempSrc: Oral   SpO2: 97%   Weight: 69.5 kg (153 lb 3.5 oz)   Height: 5' 5" (1.651 m)   PainSc: 0-No pain     Wt Readings from Last 10 Encounters:   09/30/22 69.5 kg (153 " lb 3.5 oz)   06/24/22 69.4 kg (153 lb 1.8 oz)   03/18/22 68.2 kg (150 lb 7.4 oz)   01/28/22 69 kg (152 lb 1.9 oz)   01/10/22 69.8 kg (153 lb 14.1 oz)   09/17/21 68 kg (150 lb)   08/17/21 69.1 kg (152 lb 5.4 oz)   07/28/21 71.5 kg (157 lb 8.3 oz)   06/24/21 70.5 kg (155 lb 5 oz)   02/23/21 71.6 kg (157 lb 15.4 oz)       Physical Examination:   Physical Exam  Vitals and nursing note reviewed.   Constitutional:       General: She is not in acute distress.     Appearance: She is not diaphoretic.   HENT:      Head: Normocephalic.      Mouth/Throat:      Pharynx: No oropharyngeal exudate.   Eyes:      General: No scleral icterus.     Conjunctiva/sclera: Conjunctivae normal.   Neck:      Thyroid: No thyromegaly.   Cardiovascular:      Rate and Rhythm: Normal rate and regular rhythm.      Heart sounds: Normal heart sounds. No murmur heard.  Pulmonary:      Effort: Pulmonary effort is normal. No respiratory distress.      Breath sounds: No stridor. No wheezing or rales.   Chest:      Chest wall: No tenderness.   Abdominal:      General: Bowel sounds are normal. There is no distension.      Palpations: Abdomen is soft. There is no mass.      Tenderness: There is no abdominal tenderness. There is no rebound.   Musculoskeletal:         General: No tenderness or deformity. Normal range of motion.      Cervical back: Neck supple.   Lymphadenopathy:      Cervical: No cervical adenopathy.   Skin:     General: Skin is warm and dry.      Findings: No erythema or rash.      Comments: Left mastectomy with flap reconstruction. No breast mass or axillary adenopathy bilaterally    Neurological:      Mental Status: She is alert and oriented to person, place, and time.      Cranial Nerves: No cranial nerve deficit.      Coordination: Coordination normal.      Gait: Gait is intact.   Psychiatric:         Mood and Affect: Affect normal.         Cognition and Memory: Memory normal.         Judgment: Judgment normal.        Diagnostic  Tests:  Significant Imaging: I have reviewed and interpreted all pertinent imaging results/findings.    Laboratory Data:  All pertinent labs have been reviewed.  Labs:   Lab Results   Component Value Date    WBC 5.67 09/30/2022    RBC 4.79 09/30/2022    HGB 14.3 09/30/2022    HCT 41.6 09/30/2022    MCV 87 09/30/2022     09/30/2022    GLU 88 09/30/2022     09/30/2022    K 4.3 09/30/2022    BUN 16 09/30/2022    CREATININE 0.7 09/30/2022    AST 24 09/30/2022    ALT 44 09/30/2022    BILITOT 0.8 09/30/2022       Assessment/Plan:   Malignant neoplasm of upper-inner quadrant of left female breast, unspecified estrogen receptor status  Secondary adenocarcinoma of lymph node  Use of anastrozole    [facility] upper inner quadrant of left breast [id number] stage IIIA, ER positive, WV positive, HER2 negative, dx in 2016     Continue anastrozole, recommend 10 years of treatment as tolerated given node positive disease. Continue supportive care as discussed below.     Continue active surveillance. Mammogram right breast was normal in March and bilateral MRI is normal in September. We will continue to alternate MRI/mammogram q 6 months. I will see her in March after her annual right mammogram.     AI related Arthralgias   Referred to IO to discuss acupuncture.     Osteopenia of multiple sites  Continue Prolia q 6 months. Ok to receive today.   Next Dexa scan is due in March 2024.     Peripheral neuropathy due to chemotherapy  Continue gabapentin. Will refer to Integrative oncology     Anxiety   Refer to Integrative oncology. She is interested in yoga and meditation.          ECOG SCORE    0 - Fully active-able to carry on all pre-disease performance without restriction           Discussion:   No follow-ups on file.    Plan was discussed with the patient at length, and she verbalized understanding. Ksenia was given an opportunity to ask questions that were answered to her satisfaction, and she was advised to call in the  interval if any problems or questions arise.    Electronically signed by Kandy Coles MD    Route Chart for Scheduling    Med Onc Chart Routing      Follow up with physician 6 months. cbc, cmp, md appt and prolia all same day in 6 months. Refer to Integrative oncology   Follow up with ANNE    Infusion scheduling note    Injection scheduling note Prolia 6 months   Labs CBC and CMP   Lab interval:     Imaging Mammogram   right screening mammogram end of March 2023   Pharmacy appointment    Other referrals          Therapy Plan Information  Medications  denosumab (PROLIA) injection 60 mg  60 mg, Subcutaneous, Every 26 weeks

## 2022-10-20 ENCOUNTER — OFFICE VISIT (OUTPATIENT)
Dept: OBSTETRICS AND GYNECOLOGY | Facility: CLINIC | Age: 47
End: 2022-10-20
Payer: COMMERCIAL

## 2022-10-20 VITALS
BODY MASS INDEX: 25.68 KG/M2 | SYSTOLIC BLOOD PRESSURE: 110 MMHG | WEIGHT: 154.31 LBS | DIASTOLIC BLOOD PRESSURE: 84 MMHG

## 2022-10-20 DIAGNOSIS — Z01.419 ENCOUNTER FOR ANNUAL ROUTINE GYNECOLOGICAL EXAMINATION: Primary | ICD-10-CM

## 2022-10-20 DIAGNOSIS — N95.2 VAGINAL ATROPHY: ICD-10-CM

## 2022-10-20 PROCEDURE — 3074F SYST BP LT 130 MM HG: CPT | Mod: CPTII,S$GLB,, | Performed by: OBSTETRICS & GYNECOLOGY

## 2022-10-20 PROCEDURE — 99999 PR PBB SHADOW E&M-EST. PATIENT-LVL III: ICD-10-PCS | Mod: PBBFAC,,, | Performed by: OBSTETRICS & GYNECOLOGY

## 2022-10-20 PROCEDURE — 99386 PR PREVENTIVE VISIT,NEW,40-64: ICD-10-PCS | Mod: S$GLB,,, | Performed by: OBSTETRICS & GYNECOLOGY

## 2022-10-20 PROCEDURE — 1160F PR REVIEW ALL MEDS BY PRESCRIBER/CLIN PHARMACIST DOCUMENTED: ICD-10-PCS | Mod: CPTII,S$GLB,, | Performed by: OBSTETRICS & GYNECOLOGY

## 2022-10-20 PROCEDURE — 99999 PR PBB SHADOW E&M-EST. PATIENT-LVL III: CPT | Mod: PBBFAC,,, | Performed by: OBSTETRICS & GYNECOLOGY

## 2022-10-20 PROCEDURE — 99386 PREV VISIT NEW AGE 40-64: CPT | Mod: S$GLB,,, | Performed by: OBSTETRICS & GYNECOLOGY

## 2022-10-20 PROCEDURE — 3074F PR MOST RECENT SYSTOLIC BLOOD PRESSURE < 130 MM HG: ICD-10-PCS | Mod: CPTII,S$GLB,, | Performed by: OBSTETRICS & GYNECOLOGY

## 2022-10-20 PROCEDURE — 3079F PR MOST RECENT DIASTOLIC BLOOD PRESSURE 80-89 MM HG: ICD-10-PCS | Mod: CPTII,S$GLB,, | Performed by: OBSTETRICS & GYNECOLOGY

## 2022-10-20 PROCEDURE — 1160F RVW MEDS BY RX/DR IN RCRD: CPT | Mod: CPTII,S$GLB,, | Performed by: OBSTETRICS & GYNECOLOGY

## 2022-10-20 PROCEDURE — 1159F MED LIST DOCD IN RCRD: CPT | Mod: CPTII,S$GLB,, | Performed by: OBSTETRICS & GYNECOLOGY

## 2022-10-20 PROCEDURE — 3079F DIAST BP 80-89 MM HG: CPT | Mod: CPTII,S$GLB,, | Performed by: OBSTETRICS & GYNECOLOGY

## 2022-10-20 PROCEDURE — 1159F PR MEDICATION LIST DOCUMENTED IN MEDICAL RECORD: ICD-10-PCS | Mod: CPTII,S$GLB,, | Performed by: OBSTETRICS & GYNECOLOGY

## 2022-10-20 NOTE — PROGRESS NOTES
Chief Complaint   Patient presents with    Well Woman       HISTORY OF PRESENT ILLNESS:   Ksenia Arce is a 47 y.o. female  with h/o breast cancer  2016 s/p masectomy and treatment, currently on oral therapy since , s/p TLH/BSO who presents for well woman exam.  Patient's last menstrual period was 2013..  She complaints of vaginal dryness and discomfort during intercourse. Does coconut oil during intercourse and tried astroglide but seems to irritate. Did replens but didn't seem to notice a difference.       Past Medical History:   Diagnosis Date    Breast cancer 2016    H/O total hysterectomy     Mental disorder     Migraine headache     Neuropathy        Past Surgical History:   Procedure Laterality Date    BREAST CAPSULECTOMY      BREAST RECONSTRUCTION      COLONOSCOPY N/A 2021    Procedure: COLONOSCOPY;  Surgeon: Diony Montes MD;  Location: [facility];  Service: Endoscopy;  Laterality: N/A;    HYSTERECTOMY      MASTECTOMY Left     TONSILLECTOMY         Social History     Socioeconomic History    Marital status:    Tobacco Use    Smoking status: Never    Smokeless tobacco: Never   Substance and Sexual Activity    Alcohol use: Yes    Drug use: Never    Sexual activity: Yes     Partners: Male       Family History   Problem Relation Age of Onset    Heart disease Father     Clotting disorder Mother        OB History    Para Term  AB Living   2 2 2         SAB IAB Ectopic Multiple Live Births                  # Outcome Date GA Lbr Cabrera/2nd Weight Sex Delivery Anes PTL Lv   2 Term            1 Term                COMPREHENSIVE GYN HISTORY:  PAP History: Denies abnormal Paps  Infection History: Denies STDs. Denies PID.  Benign History: Denies uterine fibroids. Denies ovarian cysts. Denies endometriosis Denies other conditions.  Cancer History: Denies cervical cancer. Denies uterine cancer or hyperplasia. Denies ovarian cancer. Denies vulvar cancer or pre-cancer. Denies vaginal  cancer or pre-cancer. Denies breast cancer. Denies colon cancer.  Cycle: 13/ monthly   H/o breast cancer s/p treatment and masectomy  TLH/BSO 2018    ROS:  GENERAL: Denies weight gain or weight loss. Feeling well overall.   SKIN: Denies rash or lesions.   HEAD: Denies headache.   NODES: Denies enlarged lymph nodes.   CHEST: Denies shortness of breath.   ABDOMEN: No abdominal pain, constipation, diarrhea, nausea, vomiting or rectal bleeding.   URINARY: No frequency, dysuria, hematuria, or burning on urination.  REPRODUCTIVE: See HPI.   BREASTS: The patient denies pain, lumps, or nipple discharge.       /84   Wt 70 kg (154 lb 5.2 oz)   LMP 11/27/2013 Comment: GOPAL  BMI 25.68 kg/m²   APPEARANCE: Well nourished, well developed, in no acute distress.  NECK: Neck symmetric   ABDOMEN: Soft. No tenderness or masses. No hernias. No hepatosplenomegaly.  BREASTS: Symmetrical, no skin changes or visible lesions on right, on left has well healed scar c/w masectomy and revision. No palpable masses, nipple discharge or adenopathy bilaterally.   PELVIC:   VULVA: No lesions. Normal female genitalia.  URETHRAL MEATUS: Normal size and location, no lesions, no prolapse.  URETHRA: No masses, tenderness, prolapse or scarring.  VAGINA: atrophic, no discharge, no significant cystocele or rectocele.  CERVIX: absent, well healed cuff   UTERUS: absent   ADNEXA: No masses or tenderness.      Lipid profile: Date:   Lab Results   Component Value Date    CHOL 142 01/31/2022    CHOL 296 (H) 07/28/2021    CHOL 282 (H) 01/28/2021     Lab Results   Component Value Date    HDL 55 01/31/2022    HDL 64 07/28/2021    HDL 70 01/28/2021     Lab Results   Component Value Date    LDLCALC 72.6 01/31/2022    LDLCALC 193.8 (H) 07/28/2021    LDLCALC 176.6 (H) 01/28/2021     Lab Results   Component Value Date    TRIG 72 01/31/2022    TRIG 191 (H) 07/28/2021    TRIG 177 (H) 01/28/2021     Lab Results   Component Value Date    CHOLHDL 38.7 01/31/2022     CHOLHDL 21.6 07/28/2021    CHOLHDL 24.8 01/28/2021       1. Encounter for annual routine gynecological examination    2. Vaginal atrophy        Plan:   1. Routine gyn annual exam. s/p normal breast exam and MMG scheduled with oncologist.  Pap with HPV cotesting not indicated since no cervix and no h/o abnormal.   2. Discussed vaginal atrophy as causing vaginal dryness and pain during intercourse. Recommend lubricants during intercourse and vaginal moisturizer like replens or coconut oil more frequently and even when not having intercourse. Though with her history would not be first like could consider topical estrogen. Discussed risk of absorption is very low but wouldn't be a zero risk.           F/u in 1 yr or PRN

## 2022-10-21 RX ORDER — ATORVASTATIN CALCIUM 40 MG/1
40 TABLET, FILM COATED ORAL DAILY
Qty: 90 TABLET | Refills: 1 | Status: SHIPPED | OUTPATIENT
Start: 2022-10-21 | End: 2023-05-04 | Stop reason: SDUPTHER

## 2022-10-21 NOTE — TELEPHONE ENCOUNTER
No new care gaps identified.  [facility] Embedded Care Gaps. Reference number: [id number]. 10/21/2022   1:08:14 PM CDT

## 2022-10-25 ENCOUNTER — PATIENT MESSAGE (OUTPATIENT)
Dept: INTERNAL MEDICINE | Facility: CLINIC | Age: 47
End: 2022-10-25
Payer: COMMERCIAL

## 2022-10-29 RX ORDER — GALCANEZUMAB 120 MG/ML
INJECTION, SOLUTION SUBCUTANEOUS
Qty: 1 ML | Refills: 2 | Status: SHIPPED | OUTPATIENT
Start: 2022-10-29 | End: 2023-02-09

## 2022-11-11 ENCOUNTER — PATIENT MESSAGE (OUTPATIENT)
Dept: NEUROLOGY | Facility: CLINIC | Age: 47
End: 2022-11-11
Payer: COMMERCIAL

## 2022-11-17 ENCOUNTER — OFFICE VISIT (OUTPATIENT)
Dept: INTERNAL MEDICINE | Facility: CLINIC | Age: 47
End: 2022-11-17
Payer: COMMERCIAL

## 2022-11-17 VITALS
WEIGHT: 158.94 LBS | HEIGHT: 65 IN | HEART RATE: 76 BPM | OXYGEN SATURATION: 98 % | TEMPERATURE: 98 F | BODY MASS INDEX: 26.48 KG/M2 | DIASTOLIC BLOOD PRESSURE: 74 MMHG | SYSTOLIC BLOOD PRESSURE: 114 MMHG

## 2022-11-17 DIAGNOSIS — J06.9 UPPER RESPIRATORY TRACT INFECTION, UNSPECIFIED TYPE: ICD-10-CM

## 2022-11-17 DIAGNOSIS — G43.009 MIGRAINE WITHOUT AURA AND WITHOUT STATUS MIGRAINOSUS, NOT INTRACTABLE: ICD-10-CM

## 2022-11-17 DIAGNOSIS — C50.212 MALIGNANT NEOPLASM OF UPPER-INNER QUADRANT OF LEFT FEMALE BREAST, UNSPECIFIED ESTROGEN RECEPTOR STATUS: ICD-10-CM

## 2022-11-17 DIAGNOSIS — E78.5 HYPERLIPIDEMIA, UNSPECIFIED HYPERLIPIDEMIA TYPE: Primary | ICD-10-CM

## 2022-11-17 DIAGNOSIS — F41.1 GENERALIZED ANXIETY DISORDER: ICD-10-CM

## 2022-11-17 PROCEDURE — 99214 PR OFFICE/OUTPT VISIT, EST, LEVL IV, 30-39 MIN: ICD-10-PCS | Mod: S$GLB,,, | Performed by: INTERNAL MEDICINE

## 2022-11-17 PROCEDURE — 1160F RVW MEDS BY RX/DR IN RCRD: CPT | Mod: CPTII,S$GLB,, | Performed by: INTERNAL MEDICINE

## 2022-11-17 PROCEDURE — 3078F DIAST BP <80 MM HG: CPT | Mod: CPTII,S$GLB,, | Performed by: INTERNAL MEDICINE

## 2022-11-17 PROCEDURE — 1159F PR MEDICATION LIST DOCUMENTED IN MEDICAL RECORD: ICD-10-PCS | Mod: CPTII,S$GLB,, | Performed by: INTERNAL MEDICINE

## 2022-11-17 PROCEDURE — 1160F PR REVIEW ALL MEDS BY PRESCRIBER/CLIN PHARMACIST DOCUMENTED: ICD-10-PCS | Mod: CPTII,S$GLB,, | Performed by: INTERNAL MEDICINE

## 2022-11-17 PROCEDURE — 1159F MED LIST DOCD IN RCRD: CPT | Mod: CPTII,S$GLB,, | Performed by: INTERNAL MEDICINE

## 2022-11-17 PROCEDURE — 99999 PR PBB SHADOW E&M-EST. PATIENT-LVL IV: ICD-10-PCS | Mod: PBBFAC,,, | Performed by: INTERNAL MEDICINE

## 2022-11-17 PROCEDURE — 3078F PR MOST RECENT DIASTOLIC BLOOD PRESSURE < 80 MM HG: ICD-10-PCS | Mod: CPTII,S$GLB,, | Performed by: INTERNAL MEDICINE

## 2022-11-17 PROCEDURE — 3008F BODY MASS INDEX DOCD: CPT | Mod: CPTII,S$GLB,, | Performed by: INTERNAL MEDICINE

## 2022-11-17 PROCEDURE — 3074F SYST BP LT 130 MM HG: CPT | Mod: CPTII,S$GLB,, | Performed by: INTERNAL MEDICINE

## 2022-11-17 PROCEDURE — 3074F PR MOST RECENT SYSTOLIC BLOOD PRESSURE < 130 MM HG: ICD-10-PCS | Mod: CPTII,S$GLB,, | Performed by: INTERNAL MEDICINE

## 2022-11-17 PROCEDURE — 99214 OFFICE O/P EST MOD 30 MIN: CPT | Mod: S$GLB,,, | Performed by: INTERNAL MEDICINE

## 2022-11-17 PROCEDURE — 3008F PR BODY MASS INDEX (BMI) DOCUMENTED: ICD-10-PCS | Mod: CPTII,S$GLB,, | Performed by: INTERNAL MEDICINE

## 2022-11-17 PROCEDURE — 99999 PR PBB SHADOW E&M-EST. PATIENT-LVL IV: CPT | Mod: PBBFAC,,, | Performed by: INTERNAL MEDICINE

## 2022-11-17 RX ORDER — METHYLPREDNISOLONE 4 MG/1
TABLET ORAL
Qty: 21 EACH | Refills: 0 | Status: SHIPPED | OUTPATIENT
Start: 2022-11-17 | End: 2022-12-29 | Stop reason: SDUPTHER

## 2022-11-17 NOTE — PROGRESS NOTES
[facility] Note    Chief Complaint      Chief Complaint   Patient presents with    Follow-up     6 mth    Hyperlipidemia     Discuss labs     History of Present Illness      Ksenia Arce is a 47 y.o. female who presents today for chief complaint follow up chronic issues .     Follow-up  Pertinent negatives include no chest pain, chills, coughing, fever, nausea or vomiting.   Hyperlipidemia  Pertinent negatives include no chest pain or shortness of breath.    Last seen in January for annual 1.22  Was seeing mark white but will stop seeing so would like me to fill her prozac and prn ativan   She is on lipitro for HLD    She thinks she has plantar fascitis, has right heep pain in the morning, she has a plantar fascitis sock which helps   Saw león for ho breast cancer   Saw dr roberts and rina simpson who recommended about genetci testing   Seeing Neuro in Jan for headache emgality is not working,  has not had eye exam,   She is on nurtec rescue but trying to use more often, had done botox in the past   She has a sore throat from today, no fever, no cough     Breast cancer - had mri 93.22 alternated q 6 mo with mmg, is on arimidex, sees León only , had seen kris in th epast    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 [employer]    Mom is asim salcedo      cscope 9.2021 for diarrhea 10 year repeat   Active Problem List with Overview Notes    Diagnosis Date Noted    Peripheral neuropathy due to chemotherapy 06/24/2022    Osteopenia of multiple sites 03/18/2022    Use of anastrozole 03/18/2022    Secondary adenocarcinoma of lymph node 03/18/2022    Hyperlipidemia 01/31/2022    Diarrhea 09/17/2021    Advice given about COVID-19 virus infection 01/28/2021    Psoriasis 07/29/2020    Malignant neoplasm of left breast 01/23/2020     lobualr breast cancer of L breast dx in 5/2016 found on BSE, felt  mass, had us and MMG was told was cyst but 2/2 pain sought eval by Dr surgeon Sr Lopez who did bx  -had mastectomy on L side and LN dissection , was in 6/9, chemo mastectomy radiation, taxol and doxirubicin, radiation x36, completed in 5/17, has been on arimidex since then, will beon for 10 total   -[facility]  Onc Julia Armstrong - she follows [facility]  [facility]  Gyn Sy   Plastics Mliss Yossi   Had brca testing which was negative, er positive, her2 neg         Migraines 01/23/2020    Generalized anxiety disorder 01/23/2020     On daily prozac, sees adam flores, uses prn ativan 2-3x a month       Herpes simplex 01/23/2020     Takes valcyte prn outbreak, oral lesions      History of total abdominal hysterectomy 01/23/2020     S/p with Yossi          Health Maintenance   Topic Date Due    Mammogram  03/24/2023    TETANUS VACCINE  06/01/2025    Lipid Panel  01/31/2027    Hepatitis C Screening  Completed       Past Medical History:   Diagnosis Date    Breast cancer 05/2016    H/O total hysterectomy     Mental disorder     Migraine headache     Neuropathy        Past Surgical History:   Procedure Laterality Date    BREAST CAPSULECTOMY      BREAST RECONSTRUCTION      COLONOSCOPY N/A 9/17/2021    Procedure: COLONOSCOPY;  Surgeon: Diony Montes MD;  Location: [facility];  Service: Endoscopy;  Laterality: N/A;    HYSTERECTOMY      MASTECTOMY Left     TONSILLECTOMY         family history includes Clotting disorder in her mother; Heart disease in her father.    Social History     Tobacco Use    Smoking status: Never    Smokeless tobacco: Never   Substance Use Topics    Alcohol use: Yes    Drug use: Never       Review of Systems   Constitutional:  Negative for chills, fever, malaise/fatigue and weight loss.   Respiratory:  Negative for cough, sputum production, shortness of breath and wheezing.    Cardiovascular:  Negative for chest pain, palpitations, orthopnea and leg swelling.    Gastrointestinal:  Negative for constipation, diarrhea, nausea and vomiting.   Genitourinary:  Negative for dysuria, frequency, hematuria and urgency.      Outpatient Encounter Medications as of 11/17/2022   Medication Sig Note Dispense Refill    albuterol (PROVENTIL HFA) 90 mcg/actuation inhaler Inhale 2 puffs into the lungs every 6 (six) hours as needed for Wheezing. Rescue  18 g 0    anastrozole (ARIMIDEX) 1 mg Tab Take 1 tablet (1 mg total) by mouth once daily.  90 tablet 6    atorvastatin (LIPITOR) 40 MG tablet Take 1 tablet (40 mg total) by mouth once daily.  90 tablet 1    FLUoxetine 40 MG capsule Take 1 capsule (40 mg total) by mouth every morning.  90 capsule 0    gabapentin (NEURONTIN) 300 MG capsule Take 1 capsule (300 mg total) by mouth 3 (three) times daily.  270 capsule 1    galcanezumab-gnlm (EMGALITY PEN) 120 mg/mL PnIj Inject one pen under the skin every 30 days. Keep in fridge. Remove for 30 minutes prior to the injection.  1 mL 2    LORazepam (ATIVAN) 0.5 MG tablet Take 1 tablet by mouth daily as needed. 7/29/2020: Take as needed      naproxen sodium (ANAPROX) 550 MG tablet Take 1 tablet (550 mg total) by mouth every 8 (eight) hours.  30 tablet 1    ondansetron (ZOFRAN) 4 MG tablet Take 2 tablets (8 mg total) by mouth every 8 (eight) hours as needed for Nausea.  21 tablet 0    rimegepant 75 mg odt Place 1 tablet on the tongue every other day; alternatively the 1 tablet may be placed under the tongue  16 tablet 1    valACYclovir (VALTREX) 1000 MG tablet Take 1 tablet (1,000 mg total) by mouth 2 (two) times daily as needed. 7/29/2020: Take as needed 30 tablet 3    [DISCONTINUED] cholecalciferol, vitamin D3, 25 mcg (1,000 unit) Chew Take 1 tablet by mouth once daily.       meloxicam (MOBIC) 7.5 MG tablet Take 1 tablet (7.5 mg total) by mouth once daily. (Patient not taking: Reported on 11/17/2022)  10 tablet 1    [DISCONTINUED] FLUoxetine 40 MG capsule Take 1 capsule by mouth once daily.        "[DISCONTINUED] promethazine-dextromethorphan (PROMETHAZINE-DM) 6.25-15 mg/5 mL Syrp Take 5 mLs by mouth every 6 (six) hours as needed. (Patient not taking: No sig reported)  140 mL 0     No facility-administered encounter medications on file as of 11/17/2022.        Review of patient's allergies indicates:  No Known Allergies        Physical Exam      Vital Signs  Temp: 98.4 °F (36.9 °C)  Pulse: 76  SpO2: 98 %  BP: 114/74  BP Location: Right arm  Patient Position: Sitting  Height and Weight  Height: 5' 5" (165.1 cm)  Weight: 72.1 kg (158 lb 15.2 oz)  BSA (Calculated - sq m): 1.82 sq meters  BMI (Calculated): 26.5  Weight in (lb) to have BMI = 25: 149.9]    Physical Exam  Vitals reviewed.   Constitutional:       General: She is not in acute distress.     Appearance: She is well-developed. She is not diaphoretic.   HENT:      Head: Normocephalic and atraumatic.      Right Ear: External ear normal.      Left Ear: External ear normal.      Nose: Nose normal.   Eyes:      General:         Right eye: No discharge.         Left eye: No discharge.      Conjunctiva/sclera: Conjunctivae normal.   Pulmonary:      Effort: Pulmonary effort is normal. No respiratory distress.   Musculoskeletal:         General: Normal range of motion.      Cervical back: Normal range of motion.   Skin:     Coloration: Skin is not pale.      Findings: No rash.   Neurological:      Mental Status: She is alert and oriented to person, place, and time.   Psychiatric:         Behavior: Behavior normal.         Thought Content: Thought content normal.        Laboratory:  CBC:  Recent Labs   Lab Result Units 09/30/22  1212   WBC K/uL 5.67   RBC M/uL 4.79   Hemoglobin g/dL 14.3   Hematocrit % 41.6   Platelets K/uL 244   MCV fL 87   MCH pg 29.9   MCHC g/dL 34.4     CMP:  Recent Labs   Lab Result Units 09/30/22  1212   Glucose mg/dL 88   Calcium mg/dL 10.0   Albumin g/dL 4.3   Total Protein g/dL 7.3   Sodium mmol/L 140   Potassium mmol/L 4.3   CO2 mmol/L 26 "   Chloride mmol/L 105   BUN mg/dL 16   Alkaline Phosphatase U/L 79   ALT U/L 44   AST U/L 24   Total Bilirubin mg/dL 0.8     URINALYSIS:  No results for input(s): COLORU, CLARITYU, SPECGRAV, PHUR, PROTEINUA, GLUCOSEU, BILIRUBINCON, BLOODU, WBCU, RBCU, BACTERIA, MUCUS, NITRITE, LEUKOCYTESUR, UROBILINOGEN, HYALINECASTS in the last 2160 hours.   LIPIDS:  No results for input(s): TSH, HDL, CHOL, TRIG, LDLCALC, CHOLHDL, NONHDLCHOL, TOTALCHOLEST in the last 2160 hours.  TSH:  No results for input(s): TSH in the last 2160 hours.  A1C:  No results for input(s): HGBA1C in the last 2160 hours.    Radiology:      Assessment/Plan     Ksenia Arce is a 47 y.o.female with:    1. Hyperlipidemia, unspecified hyperlipidemia type  -     Lipid Panel; Future; Expected date: 11/17/2022    2. Malignant neoplasm of upper-inner quadrant of left female breast, unspecified estrogen receptor status  Overview:  lobualr breast cancer of L breast dx in 5/2016 found on BSE, felt mass, had us and MMG was told was cyst but 2/2 pain sought eval by Dr surgeon Sr Lopez who did bx  -had mastectomy on L side and LN dissection , was in 6/9, chemo mastectomy radiation, taxol and doxirubicin, radiation x36, completed in 5/17, has been on arimidex since then, will beon for 10 total   -gets mmg and us q6 mos [facility]  Onc Julia Armstrong - she follows [facility]  Gyn Sy   Plastics [facility]   Had brca testing which was negative, er positive, her2 neg       Orders:  -     Ambulatory referral/consult to Genetics; Future; Expected date: 11/24/2022    3. Generalized anxiety disorder  Overview:  On daily prozac, sees adam flores, uses prn ativan 2-3x a month     Assessment & Plan:  Ok to refill meds       4. Migraine without aura and without status migrainosus, not intractable  Assessment & Plan:  Per neuro            Use of the [employer] Patient Portal discussed and encouraged during today's visit  -Continue current medications and  maintain follow up with specialists.  Return to clinic in 6 months annua labs pr ior .  Future Appointments   Date Time Provider Department Center   1/12/2023  8:40 AM Shadia Valdivia MD [facility]  [place] [facility]   3/30/2023 10:00 AM MORENA JAMISON [facility] Natalio Felipe MD  11/17/2022 3:43 PM    Primary Care Internal Medicine

## 2022-11-21 ENCOUNTER — TELEPHONE (OUTPATIENT)
Dept: HEMATOLOGY/ONCOLOGY | Facility: CLINIC | Age: 47
End: 2022-11-21
Payer: COMMERCIAL

## 2022-11-23 ENCOUNTER — PATIENT MESSAGE (OUTPATIENT)
Dept: OTOLARYNGOLOGY | Facility: CLINIC | Age: 47
End: 2022-11-23

## 2022-11-23 ENCOUNTER — OFFICE VISIT (OUTPATIENT)
Dept: OTOLARYNGOLOGY | Facility: CLINIC | Age: 47
End: 2022-11-23
Payer: COMMERCIAL

## 2022-11-23 ENCOUNTER — TELEPHONE (OUTPATIENT)
Dept: OTOLARYNGOLOGY | Facility: CLINIC | Age: 47
End: 2022-11-23
Payer: COMMERCIAL

## 2022-11-23 VITALS
WEIGHT: 158 LBS | HEART RATE: 77 BPM | DIASTOLIC BLOOD PRESSURE: 94 MMHG | SYSTOLIC BLOOD PRESSURE: 130 MMHG | BODY MASS INDEX: 26.29 KG/M2

## 2022-11-23 DIAGNOSIS — J06.9 UPPER RESPIRATORY TRACT INFECTION, UNSPECIFIED TYPE: Primary | ICD-10-CM

## 2022-11-23 PROCEDURE — 3080F PR MOST RECENT DIASTOLIC BLOOD PRESSURE >= 90 MM HG: ICD-10-PCS | Mod: CPTII,S$GLB,, | Performed by: OTOLARYNGOLOGY

## 2022-11-23 PROCEDURE — 1159F MED LIST DOCD IN RCRD: CPT | Mod: CPTII,S$GLB,, | Performed by: OTOLARYNGOLOGY

## 2022-11-23 PROCEDURE — 99999 PR PBB SHADOW E&M-EST. PATIENT-LVL III: ICD-10-PCS | Mod: PBBFAC,,, | Performed by: OTOLARYNGOLOGY

## 2022-11-23 PROCEDURE — 99204 PR OFFICE/OUTPT VISIT, NEW, LEVL IV, 45-59 MIN: ICD-10-PCS | Mod: S$GLB,,, | Performed by: OTOLARYNGOLOGY

## 2022-11-23 PROCEDURE — 3008F BODY MASS INDEX DOCD: CPT | Mod: CPTII,S$GLB,, | Performed by: OTOLARYNGOLOGY

## 2022-11-23 PROCEDURE — 1159F PR MEDICATION LIST DOCUMENTED IN MEDICAL RECORD: ICD-10-PCS | Mod: CPTII,S$GLB,, | Performed by: OTOLARYNGOLOGY

## 2022-11-23 PROCEDURE — 3008F PR BODY MASS INDEX (BMI) DOCUMENTED: ICD-10-PCS | Mod: CPTII,S$GLB,, | Performed by: OTOLARYNGOLOGY

## 2022-11-23 PROCEDURE — 3075F SYST BP GE 130 - 139MM HG: CPT | Mod: CPTII,S$GLB,, | Performed by: OTOLARYNGOLOGY

## 2022-11-23 PROCEDURE — 99204 OFFICE O/P NEW MOD 45 MIN: CPT | Mod: S$GLB,,, | Performed by: OTOLARYNGOLOGY

## 2022-11-23 PROCEDURE — 99999 PR PBB SHADOW E&M-EST. PATIENT-LVL III: CPT | Mod: PBBFAC,,, | Performed by: OTOLARYNGOLOGY

## 2022-11-23 PROCEDURE — 3075F PR MOST RECENT SYSTOLIC BLOOD PRESS GE 130-139MM HG: ICD-10-PCS | Mod: CPTII,S$GLB,, | Performed by: OTOLARYNGOLOGY

## 2022-11-23 PROCEDURE — 3080F DIAST BP >= 90 MM HG: CPT | Mod: CPTII,S$GLB,, | Performed by: OTOLARYNGOLOGY

## 2022-11-23 RX ORDER — FLUTICASONE PROPIONATE 50 MCG
2 SPRAY, SUSPENSION (ML) NASAL DAILY
Qty: 16 G | Refills: 3 | Status: SHIPPED | OUTPATIENT
Start: 2022-11-23 | End: 2023-02-21

## 2022-11-23 NOTE — TELEPHONE ENCOUNTER
Returned call to patient and explained that we do not have any physicians in today for her to be squeezed in. Patient stated she would await call from [facility] to see if they could fit her in. Was thanked for calling.   ----- Message from Mireya Murrieta sent at 11/23/2022  8:44 AM CST -----  Type:  Same Day Appointment Request    Caller is requesting a same day appointment.  Caller declined first available appointment listed below.    Name of Caller: pt  When is the first available appointment? 11/30   Symptoms: sinus green drip - ears itching & pain   Best Call Back Number:[phone]  Additional Information: pt is an [employer] employee has seen her pcp and urgent care    -

## 2022-11-30 NOTE — PROGRESS NOTES
Chief Complaint   Patient presents with    sinus issues/painful throat         47 y.o. female presents with recent increased nasal congestion, post nasal drainage and throat pain. Seen by PCP and UC and given a medrol dose eda but she has not started it yet. Not currently using nasal sprays. No recent fevers. No previous nasal surgery. Has a history of migraines. She is using Mucinex for her symptoms.       Review of Systems     Constitutional: Negative for fatigue and unexpected weight change.   HENT: per HPI.  Eyes: Negative for visual disturbance.   Respiratory: Negative for shortness of breath, hemoptysis  Cardiovascular: Negative for chest pain and palpitations.   Genitourinary: Negative for dysuria and difficulty urinating.   Musculoskeletal: Negative for decreased ROM, back pain.   Skin: Negative for rash, sunburn, itching.   Neurological: Negative for dizziness and seizures.   Hematological: Negative for adenopathy. Does not bruise/bleed easily.   Psychiatric/Behavioral: Negative for agitation. The patient is not nervous/anxious.   Endocrine: Negative for rapid weight loss/weight gain, heat/cold intolerance.     Past Medical History:   Diagnosis Date    Breast cancer 05/2016    H/O total hysterectomy     Mental disorder     Migraine headache     Neuropathy        Past Surgical History:   Procedure Laterality Date    BREAST CAPSULECTOMY      BREAST RECONSTRUCTION      COLONOSCOPY N/A 9/17/2021    Procedure: COLONOSCOPY;  Surgeon: Diony Montes MD;  Location: [facility];  Service: Endoscopy;  Laterality: N/A;    HYSTERECTOMY      MASTECTOMY Left     TONSILLECTOMY         family history includes Clotting disorder in her mother; Heart disease in her father.    Pt  reports that she has never smoked. She has never used smokeless tobacco. She reports current alcohol use. She reports that she does not use drugs.    Review of patient's allergies indicates:  No Known Allergies     Physical Exam    Vitals:     11/23/22 1314   BP: (!) 130/94   Pulse: 77     Body mass index is 26.29 kg/m².    Physical Exam  Vitals and nursing note reviewed.   Constitutional:       General: She is not in acute distress.     Appearance: She is well-developed. She is not diaphoretic.   HENT:      Head: Normocephalic and atraumatic.      Right Ear: Hearing, tympanic membrane, ear canal and external ear normal. No decreased hearing noted. No middle ear effusion. Tympanic membrane has normal mobility.      Left Ear: Hearing, tympanic membrane, ear canal and external ear normal. No decreased hearing noted.  No middle ear effusion. Tympanic membrane has normal mobility.      Nose: Mucosal edema and rhinorrhea present. Rhinorrhea is clear.      Comments: Bilateral nasal cavities inspected, no polyps or purulent fluid seen.        Mouth/Throat:      Mouth: Mucous membranes are moist. No oral lesions.      Tongue: No lesions.      Palate: No mass and lesions.      Pharynx: Oropharynx is clear. Uvula midline. No pharyngeal swelling, oropharyngeal exudate or posterior oropharyngeal erythema.   Eyes:      General: Lids are normal.         Right eye: No discharge.         Left eye: No discharge.      Conjunctiva/sclera: Conjunctivae normal.      Pupils: Pupils are equal, round, and reactive to light.   Neck:      Thyroid: No thyroid mass or thyromegaly.      Trachea: Trachea and phonation normal. No tracheal tenderness or tracheal deviation.   Cardiovascular:      Heart sounds: Normal heart sounds.   Pulmonary:      Breath sounds: Normal breath sounds. No stridor.   Abdominal:      Palpations: Abdomen is soft.   Musculoskeletal:      Cervical back: Normal range of motion and neck supple. No edema or erythema.   Lymphadenopathy:      Cervical: No cervical adenopathy.   Skin:     General: Skin is warm and dry.      Coloration: Skin is not pale.      Findings: No erythema or rash.   Neurological:      Mental Status: She is alert and oriented to person,  place, and time.          Assessment     1. Upper respiratory tract infection, unspecified type          Plan  In summary, Ms. Arce is a 47 year old female with recent URI. Recommend Flonase and nasal saline for improved mucociliary clearance as well as the already prescribed Medrol Dosepak for her acute symptoms. All questions were answered. Return to clinic if symptoms fail to improve or worsen.

## 2022-12-28 ENCOUNTER — PATIENT MESSAGE (OUTPATIENT)
Dept: INTERNAL MEDICINE | Facility: CLINIC | Age: 47
End: 2022-12-28
Payer: COMMERCIAL

## 2022-12-28 DIAGNOSIS — J06.9 UPPER RESPIRATORY TRACT INFECTION, UNSPECIFIED TYPE: ICD-10-CM

## 2022-12-29 ENCOUNTER — PATIENT MESSAGE (OUTPATIENT)
Dept: INTERNAL MEDICINE | Facility: CLINIC | Age: 47
End: 2022-12-29
Payer: COMMERCIAL

## 2022-12-29 RX ORDER — METHYLPREDNISOLONE 4 MG/1
TABLET ORAL
Qty: 21 EACH | Refills: 0 | Status: SHIPPED | OUTPATIENT
Start: 2022-12-29 | End: 2023-02-09

## 2022-12-29 RX ORDER — AMOXICILLIN AND CLAVULANATE POTASSIUM 875; 125 MG/1; MG/1
1 TABLET, FILM COATED ORAL EVERY 12 HOURS
Qty: 14 TABLET | Refills: 0 | Status: SHIPPED | OUTPATIENT
Start: 2022-12-29 | End: 2023-02-09 | Stop reason: ALTCHOICE

## 2023-01-20 ENCOUNTER — PATIENT MESSAGE (OUTPATIENT)
Dept: HEMATOLOGY/ONCOLOGY | Facility: CLINIC | Age: 48
End: 2023-01-20
Payer: COMMERCIAL

## 2023-01-23 ENCOUNTER — PATIENT MESSAGE (OUTPATIENT)
Dept: HEMATOLOGY/ONCOLOGY | Facility: CLINIC | Age: 48
End: 2023-01-23
Payer: COMMERCIAL

## 2023-01-23 ENCOUNTER — TELEPHONE (OUTPATIENT)
Dept: HEMATOLOGY/ONCOLOGY | Facility: CLINIC | Age: 48
End: 2023-01-23
Payer: COMMERCIAL

## 2023-01-23 NOTE — TELEPHONE ENCOUNTER
Spoke with Ksenia and she is r/s virtually for this Thursday, 1/26 for 12:30pm.    ----- Message from Maddie Thomas sent at 1/23/2023  8:28 AM CST -----  Regarding: Reschedule Existing Appointment  Appt Date: 1/23    Type of appt : LORIN    Physician: Shen    Reason for rescheduling? Can't make it    Caller:  self    Contact Preference: [phone]

## 2023-01-26 ENCOUNTER — OFFICE VISIT (OUTPATIENT)
Dept: HEMATOLOGY/ONCOLOGY | Facility: CLINIC | Age: 48
End: 2023-01-26
Payer: COMMERCIAL

## 2023-01-26 ENCOUNTER — PATIENT MESSAGE (OUTPATIENT)
Dept: HEMATOLOGY/ONCOLOGY | Facility: CLINIC | Age: 48
End: 2023-01-26

## 2023-01-26 ENCOUNTER — TELEPHONE (OUTPATIENT)
Dept: HEMATOLOGY/ONCOLOGY | Facility: CLINIC | Age: 48
End: 2023-01-26
Payer: COMMERCIAL

## 2023-01-26 DIAGNOSIS — Z71.83 ENCOUNTER FOR NONPROCREATIVE GENETIC COUNSELING: Primary | ICD-10-CM

## 2023-01-26 DIAGNOSIS — C50.919 INVASIVE LOBULAR CARCINOMA OF BREAST IN FEMALE: ICD-10-CM

## 2023-01-26 DIAGNOSIS — Z80.3 FAMILY HISTORY OF BREAST CANCER: ICD-10-CM

## 2023-01-26 PROCEDURE — 99215 PR OFFICE/OUTPT VISIT, EST, LEVL V, 40-54 MIN: ICD-10-PCS | Mod: 95,,, | Performed by: NURSE PRACTITIONER

## 2023-01-26 PROCEDURE — 99215 OFFICE O/P EST HI 40 MIN: CPT | Mod: 95,,, | Performed by: NURSE PRACTITIONER

## 2023-01-26 NOTE — PROGRESS NOTES
"Cancer Genetics  Hereditary and High-Risk Clinic  Department of Hematology and Oncology  [facility]    [facility]    Date of Service:  23  Visit Provider:  Saman Craig DNP  Collaborating Physician:  Kathy Warner MD    Patient ID  Name: Ksenia Arce    : 1975    MRN: [id number]      Referring Provider  Lourdes Felipe MD  [address]  [address] [place] [address]    Televisit Information  The patient location is: [place], [place].    The chief complaint leading to consultation is:  As below.    Visit type: audiovisual.    Face-to-face time with patient:  Approximately 37 minutes.    Approximately 51 minutes in total were spent on this encounter, which includes face-to-face time and non-face-to-face time preparing to see the patient (e.g., review of tests), obtaining and/or reviewing separately obtained history, documenting clinical information in the electronic or other health record, independently interpreting results (not separately reported) and communicating results to the patient/family/caregiver, or care coordination (not separately reported).  Each patient to whom he or she provides medical services by telemedicine is:  (1) informed of the relationship between the physician and patient and the respective role of any other health care provider with respect to management of the patient; and (2) notified that he or she may decline to receive medical services by telemedicine and may withdraw from such care at any time.  Notes:  As below.    SUBJECTIVE      Chief Complaint: Genetic Evaluation    History of Present Illness (HPI):  Ksenia Arce ("Ksenia"), 47 y.o., assigned female sex at birth, is established with the [facility] but new to me.  She was referred by  Dr. Barnes with [facility]  for cancer genetic risk assessment given her diagnosis of breast cancer.    Focused Medical History  Germline cancer " "genetic testing:  Yes  Patient has a [employer] brochure/paperwork packet dated 06/29/2016 but no results report on hand; she states, however, that she does recall undergoing genetic testing via saliva sample collection and being made aware that she "does not have the gene"  Cancer:  Yes  Infiltrating lobular carcinoma of the left breast, ER(+)TX(+)HER2(--), diagnosed at age 40 y (05/2016); underwent neoadjuvant chemotherapy, which was completed in 11/2016; underwent left mastectomy on 12/26/2016; 6/14 examined lymph nodes were positive; pathologic stage was IIIa; radiation therapy was completed in 05/2017; has been on anastrozole since 05/2017  Colon polyp:  No  Colonoscopy 09/17/2021, age 45 y:  No polyps; 10-year repeat was recommended at that time  Other benign tumor/mass:  No  Pancreatitis or pancreatic cyst:  No  Blood disorder:  No    NF1-Specific History  Neurofibromas:   No  Café-au-lait spots:    No  Freckles in the underarms/groin: No  Lisch nodule(s) of the eyes:   No  Short stature:     No (Height 5'5")  Macrocephaly:     No  Skeletal abnormality:  No    Focused Surgical History  Hysterectomy  Bilateral salpingo-oophorectomy    Tobacco Use  Never smoker    Ancestry  Ashkenazi Christianity:  No  Consanguinity:  No    Family Oncologic History  ** The pedigree below was placed into this note in a size that produced a legible font.  If it is appearing small/illegible on your screen, expand this note window horizontally. **    Hereditary cancer genetic testing in blood relatives:  No  Other than noted, no known history of cancer in relatives depicted in the pedigree or in:  maternal extended family, paternal extended family.  Other than noted, no known family history of benign tumors or colon polyps.    Review of Systems  See HPI.       OBJECTIVE       Physical Exam  Significantly limited secondary to the inherent nature of a virtual visit.  Very pleasant patient.  Constitutional      Appearance:  Appears well " developed and well nourished. No distress.   Neurological     Mental Status:  Alert and oriented.  Psychiatric         Mood and Affect:  Normal.     Thought Content:  Normal.     Speech:  Normal.     Behavior:  Normal.     Judgment:  Normal.  Genetics-specific     It is my assessment that the patient is ready to proceed with cancer genetic testing from a psychosocial perspective.    CANCER GENETIC COUNSELING      Cancer Genetics     Germline cancer genetic testing is the testing of genes associated with cancer, known as cancer susceptibility genes.  Just as these genes are inherited from parents--one copy of each gene from each parent--mutations in these genes can be inherited, as well.  A mutation in a cancer susceptibility gene adversely affects the gene's ability to prevent cancer; therefore, carriers of cancer susceptibility gene mutations may be at increased risk for certain cancers.     Causes of Cancer    Only approximately 5%-10% of cancers are caused by an inherited cancer susceptibility gene mutation; rather, the majority of cancers are sporadic.  Causes of sporadic cancers may include environmental risk factors, lifestyle risk factors, and non-modifiable risk factors.  It is important to note that members of a family often share not only their genetics but also other risk factors, including environmental and lifestyle risk factors, so cancers can be familial.     Potential Results of Genetic Testing, and Their Implications     Potential results of genetic testing include positive, negative, and variant of unknown significance (VUS).    Positive:  A positive result indicates the presence of at least one clinically significant gene mutation, and the individual's associated cancer risks vary depending upon the cancer susceptibility gene(s) in which there is/are a mutation(s).  With a positive result, depending upon the specific result and the individual's clinical history, modified risk management may be  recommended, including measures for risk reduction and/or surveillance; however, there are not always effective strategies for modified risk management.  Negative:  A negative result indicates that no clinically significant mutations were identified in the gene(s) tested.    VUS:  A VUS indicates that there is not presently enough data for the laboratory to make a determination as to whether the genetic variant is clinically significant.  VUSs are not typically acted upon clinically.       Genetic Mutation Inheritance     When an individual tests positive for a gene mutation, her first-degree relatives each have a 50% chance of carrying the same mutation, and other, more distant blood relatives can also be at risk of carrying the same mutation.       Genetic Discrimination     Genetic discrimination occurs when individuals are discriminated against on the basis of their genetic information.    The Genetic Information Nondiscrimination Act of 2008 ([place]) is U[place]. federal legislation that provides some protections against use of an individual's genetic information by their health insurer and by their employer.      Title I of [place] prohibits most health insurers from utilizing an individual's genetic information to make decisions regarding insurance eligibility or premium charges.  This protection does not apply to health insurance obtained through a job with the [employer], and it is unclear whether it applies to health insurance obtained through the Federal Employees Health Benefits Plan.    Title II of [place] prohibits covered entities, in many cases, from requesting or requiring the genetic information of employees and applicants and from using said information to make employment decisions.  This protection does not apply to employers with fewer than 15 employees or to the .    [place] does not protect individuals from genetic discrimination by any other type of policy or entity, including but not limited to life  "insurance, disability insurance, long-term care insurance,  benefits, and [facility] benefits.    Genetic Testing Logistics    There is a potential for the patient to incur out-of-pocket costs related to genetic testing.    One can expect this genetic testing to take approximately three weeks to result.    Post-test genetic counseling can be conducted once the genetic testing results are available.     Cancer Genetics Impression    NF1 is a tumor-suppressor gene with which current national clinical guidelines associates breast cancer but which was not included on Ksenia's original germline cancer-genetic testing panel, which was performed through [facility] laboratory.  Germline testing of NF1 is offered through other genetic testing laboratories; however, there is low index of suspicion for NF1 in her given lack of personal history consistent with such.    If all of the currently recommended breast cancer susceptibility genes were tested in Ksenia, likely no further hereditary cancer susceptibility gene testing will be necessary for her at this time based on her reported personal and family history.      We can make this determination once I receive and review her ~2016 genetics report from [facility].  I have sent Ksenia the information she needs to obtain this.    ASSESSMENT / PLAN      Ksenia Arce ("Ksenia"), 47 y.o., presented today for cancer genetic risk assessment given her diagnosis of breast cancer.  Cancer genetic risk assessment and pre-test cancer genetic counseling were conducted.    If all of the currently recommended breast cancer susceptibility genes were tested in Ksenia, likely no further hereditary cancer susceptibility gene testing will be necessary for her at this time based on her reported personal and family history.  We can make this determination once I receive and review her ~2016 genetics report from [facility].  I have sent Ksenia the information she needs to " obtain this.        ICD-10-CM ICD-9-CM   1. Encounter for nonprocreative genetic counseling  Z71.83 V26.33   2. Invasive lobular carcinoma of breast in female  C50.919 174.9   3. Family history of breast cancer  Z80.3 V16.3     1. Encounter for nonprocreative genetic counseling    2. Invasive lobular carcinoma of breast in female  - Ambulatory referral/consult to Genetics    3. Family history of breast cancer     Follow-up:  Follow up for pending review of [facility] genetics report from ~2016.    Questions were encouraged and answered to the patient's satisfaction, and she verbalized understanding of the information and agreement with the plan.           Saman Craig, DNP, APRN, FNP-BC, AOCNP, CGRA  Nurse Practitioner, Hereditary and High-Risk Clinic  Department of Hematology and Oncology  [facility]

## 2023-01-26 NOTE — TELEPHONE ENCOUNTER
Attempted to call and confirm today's virtual consult for 12:30 after she did not check in by 12:35pm. Straight to , left a brief message with my direct call back number.

## 2023-02-09 ENCOUNTER — OFFICE VISIT (OUTPATIENT)
Dept: NEUROLOGY | Facility: CLINIC | Age: 48
End: 2023-02-09
Payer: COMMERCIAL

## 2023-02-09 VITALS
HEART RATE: 77 BPM | SYSTOLIC BLOOD PRESSURE: 121 MMHG | HEIGHT: 65 IN | DIASTOLIC BLOOD PRESSURE: 83 MMHG | WEIGHT: 152.75 LBS | BODY MASS INDEX: 25.45 KG/M2

## 2023-02-09 DIAGNOSIS — G43.019 INTRACTABLE MIGRAINE WITHOUT AURA AND WITHOUT STATUS MIGRAINOSUS: Primary | ICD-10-CM

## 2023-02-09 PROCEDURE — 3074F SYST BP LT 130 MM HG: CPT | Mod: CPTII,S$GLB,, | Performed by: PSYCHIATRY & NEUROLOGY

## 2023-02-09 PROCEDURE — 1159F PR MEDICATION LIST DOCUMENTED IN MEDICAL RECORD: ICD-10-PCS | Mod: CPTII,S$GLB,, | Performed by: PSYCHIATRY & NEUROLOGY

## 2023-02-09 PROCEDURE — 3079F DIAST BP 80-89 MM HG: CPT | Mod: CPTII,S$GLB,, | Performed by: PSYCHIATRY & NEUROLOGY

## 2023-02-09 PROCEDURE — 3008F BODY MASS INDEX DOCD: CPT | Mod: CPTII,S$GLB,, | Performed by: PSYCHIATRY & NEUROLOGY

## 2023-02-09 PROCEDURE — 3074F PR MOST RECENT SYSTOLIC BLOOD PRESSURE < 130 MM HG: ICD-10-PCS | Mod: CPTII,S$GLB,, | Performed by: PSYCHIATRY & NEUROLOGY

## 2023-02-09 PROCEDURE — 99214 OFFICE O/P EST MOD 30 MIN: CPT | Mod: S$GLB,,, | Performed by: PSYCHIATRY & NEUROLOGY

## 2023-02-09 PROCEDURE — 1159F MED LIST DOCD IN RCRD: CPT | Mod: CPTII,S$GLB,, | Performed by: PSYCHIATRY & NEUROLOGY

## 2023-02-09 PROCEDURE — 3079F PR MOST RECENT DIASTOLIC BLOOD PRESSURE 80-89 MM HG: ICD-10-PCS | Mod: CPTII,S$GLB,, | Performed by: PSYCHIATRY & NEUROLOGY

## 2023-02-09 PROCEDURE — 3008F PR BODY MASS INDEX (BMI) DOCUMENTED: ICD-10-PCS | Mod: CPTII,S$GLB,, | Performed by: PSYCHIATRY & NEUROLOGY

## 2023-02-09 PROCEDURE — 99214 PR OFFICE/OUTPT VISIT, EST, LEVL IV, 30-39 MIN: ICD-10-PCS | Mod: S$GLB,,, | Performed by: PSYCHIATRY & NEUROLOGY

## 2023-02-09 PROCEDURE — 99999 PR PBB SHADOW E&M-EST. PATIENT-LVL III: CPT | Mod: PBBFAC,,, | Performed by: PSYCHIATRY & NEUROLOGY

## 2023-02-09 PROCEDURE — 99999 PR PBB SHADOW E&M-EST. PATIENT-LVL III: ICD-10-PCS | Mod: PBBFAC,,, | Performed by: PSYCHIATRY & NEUROLOGY

## 2023-02-09 RX ORDER — BUTALBITAL, ACETAMINOPHEN AND CAFFEINE 50; 325; 40 MG/1; MG/1; MG/1
1 TABLET ORAL EVERY 6 HOURS PRN
Qty: 20 TABLET | Refills: 0 | Status: SHIPPED | OUTPATIENT
Start: 2023-02-09 | End: 2023-03-11

## 2023-02-09 RX ORDER — UBROGEPANT 100 MG/1
100 TABLET ORAL SEE ADMIN INSTRUCTIONS
Qty: 16 TABLET | Refills: 5 | Status: SHIPPED | OUTPATIENT
Start: 2023-02-09 | End: 2023-05-09 | Stop reason: SDUPTHER

## 2023-02-09 RX ORDER — GABAPENTIN 300 MG/1
300 CAPSULE ORAL 3 TIMES DAILY
Qty: 270 CAPSULE | Refills: 1 | Status: SHIPPED | OUTPATIENT
Start: 2023-02-09 | End: 2024-01-30

## 2023-02-09 RX ORDER — ATOGEPANT 60 MG/1
60 TABLET ORAL DAILY
Qty: 30 TABLET | Refills: 5 | Status: SHIPPED | OUTPATIENT
Start: 2023-02-09 | End: 2023-05-09 | Stop reason: SDUPTHER

## 2023-02-09 RX ORDER — NAPROXEN SODIUM 550 MG/1
550 TABLET ORAL EVERY 8 HOURS
Qty: 30 TABLET | Refills: 1 | Status: SHIPPED | OUTPATIENT
Start: 2023-02-09 | End: 2023-05-09 | Stop reason: SDUPTHER

## 2023-02-09 NOTE — PROGRESS NOTES
Subjective:       Patient ID: Ksenia Arce is a 47 y.o. female.    Chief Complaint: Migraine      Not doing as well lately.  + stress ( her mother had surgery and staying with her), but stress is not severe and her h/a freq increased prior to this stressor.   No tobacco  Social and infrequent etoh    Past Medical History:   Diagnosis Date    Breast cancer 05/2016    ER+ ILC    History of antineoplastic chemotherapy 2016 6/30/2016-11/16/2016    History of therapeutic radiation 2017    left chest, completed 5/10/17, dose 9500 cGy    Mental disorder     Migraine headache     Neuropathy       Past Surgical History:   Procedure Laterality Date    BREAST CAPSULECTOMY      BREAST RECONSTRUCTION      COLONOSCOPY N/A 09/17/2021    Procedure: COLONOSCOPY;  Surgeon: Diony Montes MD;  Location: [facility];  Service: Endoscopy;  Laterality: N/A;    MASTECTOMY Left 12/26/2016    TONSILLECTOMY      TOTAL ABDOMINAL HYSTERECTOMY W/ BILATERAL SALPINGOOPHORECTOMY  2018        Current Outpatient Medications:     anastrozole (ARIMIDEX) 1 mg Tab, Take 1 tablet (1 mg total) by mouth once daily., Disp: 90 tablet, Rfl: 6    atorvastatin (LIPITOR) 40 MG tablet, Take 1 tablet (40 mg total) by mouth once daily., Disp: 90 tablet, Rfl: 1    FLUoxetine 40 MG capsule, Take 1 capsule (40 mg total) by mouth every morning., Disp: 90 capsule, Rfl: 0    fluticasone propionate (FLONASE) 50 mcg/actuation nasal spray, 2 sprays (100 mcg total) by Each Nostril route once daily., Disp: 16 g, Rfl: 3    LORazepam (ATIVAN) 0.5 MG tablet, Take 1 tablet by mouth daily as needed., Disp: , Rfl:     ondansetron (ZOFRAN) 4 MG tablet, Take 2 tablets (8 mg total) by mouth every 8 (eight) hours as needed for Nausea., Disp: 21 tablet, Rfl: 0    promethazine-dextromethorphan (PROMETHAZINE-DM) 6.25-15 mg/5 mL Syrp, Take 5 ml by mouth every 4 to 6 hours as needed for cough, Disp: 120 mL, Rfl: 0    albuterol (PROVENTIL HFA) 90 mcg/actuation inhaler, Inhale 2 puffs  into the lungs every 6 (six) hours as needed for Wheezing. Rescue, Disp: 18 g, Rfl: 0    amoxicillin-clavulanate 875-125mg (AUGMENTIN) 875-125 mg per tablet, Take 1 tablet by mouth every 12 (twelve) hours., Disp: 14 tablet, Rfl: 0    atogepant (QULIPTA) 60 mg Tab, Take 60 mg by mouth once daily., Disp: 30 tablet, Rfl: 5    butalbital-acetaminophen-caffeine -40 mg (FIORICET, ESGIC) -40 mg per tablet, Take 1 tablet by mouth every 6 (six) hours as needed for Pain or Headaches., Disp: 20 tablet, Rfl: 0    gabapentin (NEURONTIN) 300 MG capsule, Take 1 capsule (300 mg total) by mouth 3 (three) times daily., Disp: 270 capsule, Rfl: 1    naproxen sodium (ANAPROX) 550 MG tablet, Take 1 tablet (550 mg total) by mouth every 8 (eight) hours., Disp: 30 tablet, Rfl: 1    ubrogepant (UBRELVY) 100 mg tablet, Take 1 tablet (100 mg total) by mouth As instructed for Migraine. May repeat once , after 2 hours. No more than 2 in 24 hours, Disp: 16 tablet, Rfl: 5    valACYclovir (VALTREX) 1000 MG tablet, Take 1 tablet (1,000 mg total) by mouth 2 (two) times daily as needed., Disp: 30 tablet, Rfl: 3   Review of patient's allergies indicates:  No Known Allergies     Review of Systems   Eyes:  Negative for visual disturbance.   Neurological:  Negative for syncope.   Psychiatric/Behavioral:  Negative for sleep disturbance (but sleeps only 6 hours).          Objective:      Physical Exam  Neurological:      Mental Status: Mental status is at baseline.         Assessment:       1. Intractable migraine without aura and without status migrainosus        Plan:              Migraine w/o aura now much more frequent and prolonged for a few months.(12-15/month and lasting several hours) Pt dc'd emgality after December injection bc seemed to stop working. Nurtec only works temporarily.  Recall in past had Botox which worked for a long while then stopped working and al;so had TPM, a beta blocker, and elavil  Plan change emgality to quilipta  , change nurtec to ubrelvy with adjunct therapy with naproxen and fioricet ( naproxen does help and she will now also take one if there is a weather front and this is one of her triggers. Has never had fioricet trial).   Encouraged increased sleep time to 7 hours.           Shadia Valdivia MD   02/09/2023   9:01 AM

## 2023-02-13 ENCOUNTER — TELEPHONE (OUTPATIENT)
Dept: PHARMACY | Facility: CLINIC | Age: 48
End: 2023-02-13
Payer: COMMERCIAL

## 2023-02-20 ENCOUNTER — TELEPHONE (OUTPATIENT)
Dept: HEMATOLOGY/ONCOLOGY | Facility: CLINIC | Age: 48
End: 2023-02-20
Payer: COMMERCIAL

## 2023-02-20 ENCOUNTER — PATIENT MESSAGE (OUTPATIENT)
Dept: HEMATOLOGY/ONCOLOGY | Facility: CLINIC | Age: 48
End: 2023-02-20
Payer: COMMERCIAL

## 2023-02-20 NOTE — TELEPHONE ENCOUNTER
----- Message from Francisco Adan sent at 2/20/2023 10:05 AM CST -----  Pt would like to be called back asap regarding scheduling a f/u appt after Mammogram screening on 3/30/23    Pt can be reached at [phone].    Thanks

## 2023-02-20 NOTE — TELEPHONE ENCOUNTER
Spoke with patient and scheduled f/u w/dr crawford. Pt v/u and will be here for f/u on the 31st and agreed to schedle

## 2023-02-20 NOTE — TELEPHONE ENCOUNTER
She sees no signs of a clot, instructions given for er  since tomorrow is Esdras Rodriguez. Appt made for Wednesday and er instructions given  It red, swollen, warm, any different pain and temp above 100.

## 2023-02-23 ENCOUNTER — TELEPHONE (OUTPATIENT)
Dept: HEMATOLOGY/ONCOLOGY | Facility: CLINIC | Age: 48
End: 2023-02-23
Payer: COMMERCIAL

## 2023-02-23 NOTE — TELEPHONE ENCOUNTER
"Returned call spoke to pt.   Pt will come tomorrow to see Dr. Coles for evaluation of pain to axilla.         ----- Message from Werner Hennessy sent at 2/23/2023  9:42 AM CST -----  Consult/Advisory:          Name Of Caller: Self      Contact Preference?: [phone]       Provider Name: Sanket      Does patient feel the need to be seen today? No      What is the nature of the call?: Calling to speak w/ Pilar about if Dr. Coles wanted her to be seen this week or not          Additional Notes:  "Thank you for all that you do for our patients"      "

## 2023-02-24 ENCOUNTER — TELEPHONE (OUTPATIENT)
Dept: RADIOLOGY | Facility: HOSPITAL | Age: 48
End: 2023-02-24
Payer: COMMERCIAL

## 2023-02-24 ENCOUNTER — TELEPHONE (OUTPATIENT)
Dept: HEMATOLOGY/ONCOLOGY | Facility: CLINIC | Age: 48
End: 2023-02-24
Payer: COMMERCIAL

## 2023-02-24 ENCOUNTER — OFFICE VISIT (OUTPATIENT)
Dept: HEMATOLOGY/ONCOLOGY | Facility: CLINIC | Age: 48
End: 2023-02-24
Payer: COMMERCIAL

## 2023-02-24 ENCOUNTER — HOSPITAL ENCOUNTER (OUTPATIENT)
Dept: RADIOLOGY | Facility: HOSPITAL | Age: 48
Discharge: HOME | End: 2023-02-24
Attending: INTERNAL MEDICINE
Payer: COMMERCIAL

## 2023-02-24 VITALS
DIASTOLIC BLOOD PRESSURE: 83 MMHG | HEART RATE: 76 BPM | WEIGHT: 152.13 LBS | HEIGHT: 65 IN | OXYGEN SATURATION: 99 % | RESPIRATION RATE: 18 BRPM | TEMPERATURE: 98 F | SYSTOLIC BLOOD PRESSURE: 125 MMHG | BODY MASS INDEX: 25.34 KG/M2

## 2023-02-24 DIAGNOSIS — C50.919 INVASIVE LOBULAR CARCINOMA OF BREAST IN FEMALE: ICD-10-CM

## 2023-02-24 DIAGNOSIS — C50.919 INVASIVE LOBULAR CARCINOMA OF BREAST IN FEMALE: Primary | ICD-10-CM

## 2023-02-24 PROCEDURE — 77049 MRI BREAST C-+ W/CAD BI: CPT | Mod: TC

## 2023-02-24 PROCEDURE — 25500020 PHARM REV CODE 255: Performed by: INTERNAL MEDICINE

## 2023-02-24 PROCEDURE — 99214 OFFICE O/P EST MOD 30 MIN: CPT | Mod: S$GLB,,, | Performed by: INTERNAL MEDICINE

## 2023-02-24 PROCEDURE — 77049 MRI BREAST C-+ W/CAD BI: CPT | Mod: 26,,, | Performed by: RADIOLOGY

## 2023-02-24 PROCEDURE — 1159F PR MEDICATION LIST DOCUMENTED IN MEDICAL RECORD: ICD-10-PCS | Mod: CPTII,S$GLB,, | Performed by: INTERNAL MEDICINE

## 2023-02-24 PROCEDURE — 99999 PR PBB SHADOW E&M-EST. PATIENT-LVL IV: CPT | Mod: PBBFAC,,, | Performed by: INTERNAL MEDICINE

## 2023-02-24 PROCEDURE — 3074F SYST BP LT 130 MM HG: CPT | Mod: CPTII,S$GLB,, | Performed by: INTERNAL MEDICINE

## 2023-02-24 PROCEDURE — 3079F PR MOST RECENT DIASTOLIC BLOOD PRESSURE 80-89 MM HG: ICD-10-PCS | Mod: CPTII,S$GLB,, | Performed by: INTERNAL MEDICINE

## 2023-02-24 PROCEDURE — 3074F PR MOST RECENT SYSTOLIC BLOOD PRESSURE < 130 MM HG: ICD-10-PCS | Mod: CPTII,S$GLB,, | Performed by: INTERNAL MEDICINE

## 2023-02-24 PROCEDURE — 3079F DIAST BP 80-89 MM HG: CPT | Mod: CPTII,S$GLB,, | Performed by: INTERNAL MEDICINE

## 2023-02-24 PROCEDURE — 99214 PR OFFICE/OUTPT VISIT, EST, LEVL IV, 30-39 MIN: ICD-10-PCS | Mod: S$GLB,,, | Performed by: INTERNAL MEDICINE

## 2023-02-24 PROCEDURE — 99999 PR PBB SHADOW E&M-EST. PATIENT-LVL IV: ICD-10-PCS | Mod: PBBFAC,,, | Performed by: INTERNAL MEDICINE

## 2023-02-24 PROCEDURE — A9577 INJ MULTIHANCE: HCPCS | Performed by: INTERNAL MEDICINE

## 2023-02-24 PROCEDURE — 3008F BODY MASS INDEX DOCD: CPT | Mod: CPTII,S$GLB,, | Performed by: INTERNAL MEDICINE

## 2023-02-24 PROCEDURE — 1159F MED LIST DOCD IN RCRD: CPT | Mod: CPTII,S$GLB,, | Performed by: INTERNAL MEDICINE

## 2023-02-24 PROCEDURE — 77049 MRI BREAST W/WO CONTRAST, W/CAD, BILATERAL: ICD-10-PCS | Mod: 26,,, | Performed by: RADIOLOGY

## 2023-02-24 PROCEDURE — 3008F PR BODY MASS INDEX (BMI) DOCUMENTED: ICD-10-PCS | Mod: CPTII,S$GLB,, | Performed by: INTERNAL MEDICINE

## 2023-02-24 RX ADMIN — GADOBENATE DIMEGLUMINE 15 ML: 529 INJECTION, SOLUTION INTRAVENOUS at 04:02

## 2023-02-24 NOTE — PROGRESS NOTES
PROGRESS NOTE    Subjective:       Patient ID: Ksenia Arce is a 47 y.o. female.  MRN: [id number]  : 1975    Chief Complaint: Left Breast cancer     History of Present Illness:   Ksenia Arce is a 47 y.o. female who presents with  breast cancer. See Oncology history for details.      She has been on Anastrozole since the end of .  Diagnosed in 2016, s/p neoadjuvant chemotherapy.   She had a left mastectomy with reconstruction with flap reconstruction, then underwent RT.    Interim history:  She presents today to discuss recent symptoms.   She has felt left axillary pain and fullness for about a month. She has also noticed some tingling on the left fingers.      Oncology History:     As per outside notes, Dr. Armstrong  [facility] upper inner quadrant of left breast [id number] stage IIIA, ER positive, CO positive, HER2 negative, dx in   Neoadjuvant chemotherapy ddAC followed by weekly taxol, final path showed residual disease in breast and  LN  Treated with adjuvant radiation  Initially treated with Lupron and Arimidex, started end of .  After GOPAL/BSO, Lupron was discontinued approximately   This spring is 5 years of an AI. Discussed extending therapy. Considering her pathology, I think this is reasonable with follow up of bone density     FH: Paternal GM had breast cancer in her 70's      Genetics reportedly negative in 2016  Oncology History   Malignant neoplasm of left breast   2016 Initial Diagnosis    Malignant neoplasm of left breast     3/18/2022 Cancer Staged    Staging form: Breast, AJCC 8th Edition  - Clinical: cT2, cN2, cM0, ER+, CO+, HER2-           History:  Past Medical History:   Diagnosis Date    Breast cancer 2016    ER+ [facility]    History of antineoplastic chemotherapy 2016    2016-2016    History of therapeutic radiation 2017    left chest, completed 5/10/17, dose 9500 cGy    Mental disorder     Migraine  headache     Neuropathy       Past Surgical History:   Procedure Laterality Date    BREAST CAPSULECTOMY      BREAST RECONSTRUCTION      COLONOSCOPY N/A 09/17/2021    Procedure: COLONOSCOPY;  Surgeon: Diony Montes MD;  Location: [facility];  Service: Endoscopy;  Laterality: N/A;    MASTECTOMY Left 12/26/2016    TONSILLECTOMY      TOTAL ABDOMINAL HYSTERECTOMY W/ BILATERAL SALPINGOOPHORECTOMY  2018     Family History   Problem Relation Age of Onset    Colon polyps Mother     Crohn's disease Mother     Clotting disorder Mother     Heart disease Father     Breast cancer Maternal Cousin 59        stage 0    Breast cancer Paternal Grandmother 72     Social History     Tobacco Use    Smoking status: Never    Smokeless tobacco: Never   Substance and Sexual Activity    Alcohol use: Yes    Drug use: Never    Sexual activity: Yes     Partners: Male        ROS:   Review of Systems   Constitutional:  Negative for fever, malaise/fatigue and weight loss.   HENT:  Negative for congestion, hearing loss, nosebleeds and sore throat.    Eyes:  Negative for double vision and photophobia.   Respiratory:  Negative for cough, hemoptysis, sputum production, shortness of breath and wheezing.    Cardiovascular:  Negative for chest pain, palpitations, orthopnea and leg swelling.   Gastrointestinal:  Negative for abdominal pain, blood in stool, constipation, diarrhea, heartburn, nausea and vomiting.   Genitourinary:  Negative for dysuria, hematuria and urgency.   Musculoskeletal:  Positive for back pain. Negative for joint pain and myalgias.   Skin:  Negative for itching and rash.   Neurological:  Positive for sensory change. Negative for dizziness, tingling, seizures, weakness and headaches.   Endo/Heme/Allergies:  Negative for polydipsia. Does not bruise/bleed easily.   Psychiatric/Behavioral:  Negative for depression and memory loss. The patient is nervous/anxious. The patient does not have insomnia.       Objective:     Vitals:     "02/24/23 1502   BP: 125/83   Pulse: 76   Resp: 18   Temp: 98.3 °F (36.8 °C)   TempSrc: Oral   SpO2: 99%   Weight: 69 kg (152 lb 1.9 oz)   Height: 5' 5" (1.651 m)   PainSc:   6   PainLoc: Arm     Wt Readings from Last 10 Encounters:   02/24/23 69 kg (152 lb 1.9 oz)   02/09/23 69.3 kg (152 lb 12.5 oz)   11/23/22 71.7 kg (158 lb)   11/17/22 72.1 kg (158 lb 15.2 oz)   10/20/22 70 kg (154 lb 5.2 oz)   09/30/22 69.5 kg (153 lb 3.5 oz)   06/24/22 69.4 kg (153 lb 1.8 oz)   03/18/22 68.2 kg (150 lb 7.4 oz)   01/28/22 69 kg (152 lb 1.9 oz)   01/10/22 69.8 kg (153 lb 14.1 oz)       Physical Examination:   Physical Exam  Vitals and nursing note reviewed.   Constitutional:       General: She is not in acute distress.     Appearance: She is not diaphoretic.   HENT:      Head: Normocephalic.      Mouth/Throat:      Pharynx: No oropharyngeal exudate.   Eyes:      General: No scleral icterus.     Conjunctiva/sclera: Conjunctivae normal.   Neck:      Thyroid: No thyromegaly.   Cardiovascular:      Rate and Rhythm: Normal rate and regular rhythm.      Heart sounds: Normal heart sounds. No murmur heard.  Pulmonary:      Effort: Pulmonary effort is normal. No respiratory distress.      Breath sounds: No stridor. No wheezing or rales.   Chest:      Chest wall: No tenderness.   Abdominal:      General: Bowel sounds are normal. There is no distension.      Palpations: Abdomen is soft. There is no mass.      Tenderness: There is no abdominal tenderness. There is no rebound.   Musculoskeletal:         General: No tenderness or deformity. Normal range of motion.      Cervical back: Neck supple.   Lymphadenopathy:      Cervical: No cervical adenopathy.   Skin:     General: Skin is warm and dry.      Findings: No erythema or rash.      Comments: Left mastectomy with flap reconstruction. No breast mass or axillary adenopathy bilaterally    Neurological:      Mental Status: She is alert and oriented to person, place, and time.      Cranial " Nerves: No cranial nerve deficit.      Coordination: Coordination normal.      Gait: Gait is intact.   Psychiatric:         Mood and Affect: Affect normal.         Cognition and Memory: Memory normal.         Judgment: Judgment normal.        Diagnostic Tests:  Significant Imaging: I have reviewed and interpreted all pertinent imaging results/findings.    Laboratory Data:  All pertinent labs have been reviewed.  Labs:   Lab Results   Component Value Date    WBC 5.67 09/30/2022    RBC 4.79 09/30/2022    HGB 14.3 09/30/2022    HCT 41.6 09/30/2022    MCV 87 09/30/2022     09/30/2022    GLU 88 09/30/2022     09/30/2022    K 4.3 09/30/2022    BUN 16 09/30/2022    CREATININE 0.7 09/30/2022    AST 24 09/30/2022    ALT 44 09/30/2022    BILITOT 0.8 09/30/2022       Assessment/Plan:   Malignant neoplasm of upper-inner quadrant of left female breast, unspecified estrogen receptor status  Secondary adenocarcinoma of lymph node  Use of anastrozole    [facility] upper inner quadrant of left breast [id number] stage IIIA, ER positive, VA positive, HER2 negative, dx in 2016     Continue anastrozole, recommend 10 years of treatment as tolerated given node positive disease. Continue supportive care as discussed below.     Continue active surveillance. Mammogram right breast was normal in March and bilateral MRI is normal in September. Given new symptoms, will repeat MRI and axillary US  at this time and make further recommendations based on results.     AI related Arthralgias   Referred to IO to discuss acupuncture.     Osteopenia of multiple sites  Continue Prolia q 6 months.   Next Dexa scan is due in March 2024.     Peripheral neuropathy due to chemotherapy  Continue gabapentin.        ECOG SCORE               Discussion:   No follow-ups on file.    Plan was discussed with the patient at length, and she verbalized understanding. Ksenia was given an opportunity to ask questions that were answered to her satisfaction, and she was  advised to call in the interval if any problems or questions arise.    Electronically signed by Kandy Coles MD    Route Chart for Scheduling    Med Onc Chart Routing      Follow up with physician . Appts scheduled   Follow up with ANNE    Infusion scheduling note    Injection scheduling note    Labs    Imaging MRI   mri and left breast us asap   Pharmacy appointment    Other referrals          Therapy Plan Information  Medications  denosumab (PROLIA) injection 60 mg  60 mg, Subcutaneous, Every 26 weeks

## 2023-03-02 ENCOUNTER — HOSPITAL ENCOUNTER (OUTPATIENT)
Dept: RADIOLOGY | Facility: HOSPITAL | Age: 48
Discharge: HOME | End: 2023-03-02
Attending: INTERNAL MEDICINE
Payer: COMMERCIAL

## 2023-03-02 DIAGNOSIS — C50.919 INVASIVE LOBULAR CARCINOMA OF BREAST IN FEMALE: ICD-10-CM

## 2023-03-02 PROCEDURE — 76882 US LMTD JT/FCL EVL NVASC XTR: CPT | Mod: TC,LT

## 2023-03-02 PROCEDURE — 76882 US LMTD JT/FCL EVL NVASC XTR: CPT | Mod: 26,LT,, | Performed by: RADIOLOGY

## 2023-03-02 PROCEDURE — 76882 US AXILLA ONLY (BREAST IMAGING) LEFT: ICD-10-PCS | Mod: 26,LT,, | Performed by: RADIOLOGY

## 2023-03-21 ENCOUNTER — CLINICAL SUPPORT (OUTPATIENT)
Dept: OTHER | Facility: CLINIC | Age: 48
End: 2023-03-21
Payer: COMMERCIAL

## 2023-03-21 DIAGNOSIS — Z00.8 ENCOUNTER FOR OTHER GENERAL EXAMINATION: ICD-10-CM

## 2023-03-23 VITALS
WEIGHT: 148 LBS | SYSTOLIC BLOOD PRESSURE: 123 MMHG | HEIGHT: 65 IN | BODY MASS INDEX: 24.66 KG/M2 | DIASTOLIC BLOOD PRESSURE: 86 MMHG

## 2023-03-23 LAB
GLUCOSE SERPL-MCNC: 97 MG/DL (ref 60–140)
HDLC SERPL-MCNC: 60 MG/DL
POC CHOLESTEROL, LDL (DOCK): 68 MG/DL
POC CHOLESTEROL, TOTAL: 145 MG/DL
TRIGL SERPL-MCNC: 92 MG/DL

## 2023-04-03 ENCOUNTER — PATIENT MESSAGE (OUTPATIENT)
Dept: NEUROLOGY | Facility: CLINIC | Age: 48
End: 2023-04-03
Payer: COMMERCIAL

## 2023-04-03 DIAGNOSIS — G43.019 INTRACTABLE MIGRAINE WITHOUT AURA AND WITHOUT STATUS MIGRAINOSUS: Primary | ICD-10-CM

## 2023-04-03 RX ORDER — BUTALBITAL, ACETAMINOPHEN, CAFFEINE AND CODEINE PHOSPHATE 300; 50; 40; 30 MG/1; MG/1; MG/1; MG/1
CAPSULE ORAL
Qty: 8 CAPSULE | Refills: 0 | Status: SHIPPED | OUTPATIENT
Start: 2023-04-03 | End: 2023-05-09 | Stop reason: SDUPTHER

## 2023-04-26 ENCOUNTER — HOSPITAL ENCOUNTER (OUTPATIENT)
Dept: RADIOLOGY | Facility: HOSPITAL | Age: 48
Discharge: HOME | End: 2023-04-26
Attending: INTERNAL MEDICINE
Payer: COMMERCIAL

## 2023-04-26 DIAGNOSIS — C50.212 MALIGNANT NEOPLASM OF UPPER-INNER QUADRANT OF LEFT FEMALE BREAST, UNSPECIFIED ESTROGEN RECEPTOR STATUS: ICD-10-CM

## 2023-04-26 PROCEDURE — 77067 SCR MAMMO BI INCL CAD: CPT | Mod: 26,52,, | Performed by: RADIOLOGY

## 2023-04-26 PROCEDURE — 77063 BREAST TOMOSYNTHESIS BI: CPT | Mod: 26,52,, | Performed by: RADIOLOGY

## 2023-04-26 PROCEDURE — 77063 MAMMO DIGITAL SCREENING RIGHT WITH TOMO: ICD-10-PCS | Mod: 26,52,, | Performed by: RADIOLOGY

## 2023-04-26 PROCEDURE — 77067 SCR MAMMO BI INCL CAD: CPT | Mod: TC,52

## 2023-04-26 PROCEDURE — 77067 MAMMO DIGITAL SCREENING RIGHT WITH TOMO: ICD-10-PCS | Mod: 26,52,, | Performed by: RADIOLOGY

## 2023-04-27 ENCOUNTER — PATIENT MESSAGE (OUTPATIENT)
Dept: HEMATOLOGY/ONCOLOGY | Facility: CLINIC | Age: 48
End: 2023-04-27
Payer: COMMERCIAL

## 2023-04-27 ENCOUNTER — TELEPHONE (OUTPATIENT)
Dept: RADIOLOGY | Facility: HOSPITAL | Age: 48
End: 2023-04-27
Payer: COMMERCIAL

## 2023-04-27 DIAGNOSIS — C50.919 INVASIVE LOBULAR CARCINOMA OF BREAST IN FEMALE: Primary | ICD-10-CM

## 2023-04-27 NOTE — TELEPHONE ENCOUNTER
Spoke with patient and explained mammogram findings.Patient expressed understanding of results. Patient scheduled abnormal mammogram follow up appointment at [facility] on 5/1/2023.

## 2023-05-01 ENCOUNTER — HOSPITAL ENCOUNTER (OUTPATIENT)
Dept: RADIOLOGY | Facility: HOSPITAL | Age: 48
Discharge: HOME | End: 2023-05-01
Attending: INTERNAL MEDICINE
Payer: COMMERCIAL

## 2023-05-01 DIAGNOSIS — C50.919 INVASIVE LOBULAR CARCINOMA OF BREAST IN FEMALE: ICD-10-CM

## 2023-05-01 DIAGNOSIS — R92.8 ABNORMAL FINDING ON BREAST IMAGING: ICD-10-CM

## 2023-05-01 PROCEDURE — 76642 US BREAST RIGHT LIMITED: ICD-10-PCS | Mod: 26,RT,, | Performed by: RADIOLOGY

## 2023-05-01 PROCEDURE — 76642 ULTRASOUND BREAST LIMITED: CPT | Mod: 26,RT,, | Performed by: RADIOLOGY

## 2023-05-01 PROCEDURE — 77061 MAMMO DIGITAL DIAGNOSTIC RIGHT WITH TOMO: ICD-10-PCS | Mod: 26,RT,, | Performed by: RADIOLOGY

## 2023-05-01 PROCEDURE — 77061 BREAST TOMOSYNTHESIS UNI: CPT | Mod: TC,RT

## 2023-05-01 PROCEDURE — 76642 ULTRASOUND BREAST LIMITED: CPT | Mod: TC,RT

## 2023-05-01 PROCEDURE — 77065 DX MAMMO INCL CAD UNI: CPT | Mod: 26,RT,, | Performed by: RADIOLOGY

## 2023-05-01 PROCEDURE — 77065 MAMMO DIGITAL DIAGNOSTIC RIGHT WITH TOMO: ICD-10-PCS | Mod: 26,RT,, | Performed by: RADIOLOGY

## 2023-05-01 PROCEDURE — 77061 BREAST TOMOSYNTHESIS UNI: CPT | Mod: 26,RT,, | Performed by: RADIOLOGY

## 2023-05-04 ENCOUNTER — PATIENT MESSAGE (OUTPATIENT)
Dept: OBSTETRICS AND GYNECOLOGY | Facility: CLINIC | Age: 48
End: 2023-05-04
Payer: COMMERCIAL

## 2023-05-04 RX ORDER — ATORVASTATIN CALCIUM 40 MG/1
40 TABLET, FILM COATED ORAL DAILY
Qty: 90 TABLET | Refills: 2 | Status: SHIPPED | OUTPATIENT
Start: 2023-05-04 | End: 2023-12-01

## 2023-05-04 NOTE — TELEPHONE ENCOUNTER
Refill Decision Note   Ksenia Arce  is requesting a refill authorization.  Brief Assessment and Rationale for Refill:  Approve     Medication Therapy Plan:       Medication Reconciliation Completed: No   Comments:     No Care Gaps recommended.     Note composed:11:49 AM 05/04/2023

## 2023-05-04 NOTE — TELEPHONE ENCOUNTER
No care due was identified.  [facility] Embedded Care Due Messages. Reference number: [id number].   5/04/2023 9:06:19 AM CDT

## 2023-05-05 ENCOUNTER — OFFICE VISIT (OUTPATIENT)
Dept: HEMATOLOGY/ONCOLOGY | Facility: CLINIC | Age: 48
End: 2023-05-05
Payer: COMMERCIAL

## 2023-05-05 ENCOUNTER — LAB VISIT (OUTPATIENT)
Dept: LAB | Facility: HOSPITAL | Age: 48
End: 2023-05-05
Attending: INTERNAL MEDICINE
Payer: COMMERCIAL

## 2023-05-05 VITALS
BODY MASS INDEX: 24.99 KG/M2 | HEIGHT: 65 IN | RESPIRATION RATE: 20 BRPM | OXYGEN SATURATION: 99 % | WEIGHT: 150 LBS | DIASTOLIC BLOOD PRESSURE: 77 MMHG | HEART RATE: 80 BPM | SYSTOLIC BLOOD PRESSURE: 118 MMHG

## 2023-05-05 DIAGNOSIS — C50.919 INVASIVE LOBULAR CARCINOMA OF BREAST IN FEMALE: Primary | ICD-10-CM

## 2023-05-05 DIAGNOSIS — C50.212 MALIGNANT NEOPLASM OF UPPER-INNER QUADRANT OF LEFT FEMALE BREAST, UNSPECIFIED ESTROGEN RECEPTOR STATUS: ICD-10-CM

## 2023-05-05 DIAGNOSIS — C50.919 INVASIVE LOBULAR CARCINOMA OF BREAST IN FEMALE: ICD-10-CM

## 2023-05-05 DIAGNOSIS — C77.9 SECONDARY ADENOCARCINOMA OF LYMPH NODE: ICD-10-CM

## 2023-05-05 DIAGNOSIS — Z79.811 USE OF ANASTROZOLE: ICD-10-CM

## 2023-05-05 DIAGNOSIS — M85.89 OSTEOPENIA OF MULTIPLE SITES: ICD-10-CM

## 2023-05-05 LAB
ALBUMIN SERPL BCP-MCNC: 4.2 G/DL (ref 3.5–5.2)
ALP SERPL-CCNC: 79 U/L (ref 55–135)
ALT SERPL W/O P-5'-P-CCNC: 68 U/L (ref 10–44)
ANION GAP SERPL CALC-SCNC: 8 MMOL/L (ref 8–16)
AST SERPL-CCNC: 29 U/L (ref 10–40)
BASOPHILS # BLD AUTO: 0.03 K/UL (ref 0–0.2)
BASOPHILS NFR BLD: 0.6 % (ref 0–1.9)
BILIRUB SERPL-MCNC: 0.7 MG/DL (ref 0.1–1)
BUN SERPL-MCNC: 19 MG/DL (ref 6–20)
CALCIUM SERPL-MCNC: 10.1 MG/DL (ref 8.7–10.5)
CHLORIDE SERPL-SCNC: 106 MMOL/L (ref 95–110)
CO2 SERPL-SCNC: 27 MMOL/L (ref 23–29)
CREAT SERPL-MCNC: 0.7 MG/DL (ref 0.5–1.4)
DIFFERENTIAL METHOD: ABNORMAL
EOSINOPHIL # BLD AUTO: 0.3 K/UL (ref 0–0.5)
EOSINOPHIL NFR BLD: 4.7 % (ref 0–8)
ERYTHROCYTE [DISTWIDTH] IN BLOOD BY AUTOMATED COUNT: 12.2 % (ref 11.5–14.5)
EST. GFR  (NO RACE VARIABLE): >60 ML/MIN/1.73 M^2
GLUCOSE SERPL-MCNC: 85 MG/DL (ref 70–110)
HCT VFR BLD AUTO: 42.9 % (ref 37–48.5)
HGB BLD-MCNC: 14.5 G/DL (ref 12–16)
IMM GRANULOCYTES # BLD AUTO: 0.01 K/UL (ref 0–0.04)
IMM GRANULOCYTES NFR BLD AUTO: 0.2 % (ref 0–0.5)
LYMPHOCYTES # BLD AUTO: 1.4 K/UL (ref 1–4.8)
LYMPHOCYTES NFR BLD: 26.7 % (ref 18–48)
MCH RBC QN AUTO: 29.6 PG (ref 27–31)
MCHC RBC AUTO-ENTMCNC: 33.8 G/DL (ref 32–36)
MCV RBC AUTO: 88 FL (ref 82–98)
MONOCYTES # BLD AUTO: 0.5 K/UL (ref 0.3–1)
MONOCYTES NFR BLD: 10.1 % (ref 4–15)
NEUTROPHILS # BLD AUTO: 3.1 K/UL (ref 1.8–7.7)
NEUTROPHILS NFR BLD: 57.7 % (ref 38–73)
NRBC BLD-RTO: 0 /100 WBC
PLATELET # BLD AUTO: 253 K/UL (ref 150–450)
PMV BLD AUTO: 9.1 FL (ref 9.2–12.9)
POTASSIUM SERPL-SCNC: 4.9 MMOL/L (ref 3.5–5.1)
PROT SERPL-MCNC: 7.2 G/DL (ref 6–8.4)
RBC # BLD AUTO: 4.9 M/UL (ref 4–5.4)
SODIUM SERPL-SCNC: 141 MMOL/L (ref 136–145)
WBC # BLD AUTO: 5.35 K/UL (ref 3.9–12.7)

## 2023-05-05 PROCEDURE — 3078F PR MOST RECENT DIASTOLIC BLOOD PRESSURE < 80 MM HG: ICD-10-PCS | Mod: CPTII,S$GLB,, | Performed by: INTERNAL MEDICINE

## 2023-05-05 PROCEDURE — 3074F PR MOST RECENT SYSTOLIC BLOOD PRESSURE < 130 MM HG: ICD-10-PCS | Mod: CPTII,S$GLB,, | Performed by: INTERNAL MEDICINE

## 2023-05-05 PROCEDURE — 36415 COLL VENOUS BLD VENIPUNCTURE: CPT | Performed by: INTERNAL MEDICINE

## 2023-05-05 PROCEDURE — 99999 PR PBB SHADOW E&M-EST. PATIENT-LVL V: ICD-10-PCS | Mod: PBBFAC,,, | Performed by: INTERNAL MEDICINE

## 2023-05-05 PROCEDURE — 1159F MED LIST DOCD IN RCRD: CPT | Mod: CPTII,S$GLB,, | Performed by: INTERNAL MEDICINE

## 2023-05-05 PROCEDURE — 3008F PR BODY MASS INDEX (BMI) DOCUMENTED: ICD-10-PCS | Mod: CPTII,S$GLB,, | Performed by: INTERNAL MEDICINE

## 2023-05-05 PROCEDURE — 85025 COMPLETE CBC W/AUTO DIFF WBC: CPT | Performed by: INTERNAL MEDICINE

## 2023-05-05 PROCEDURE — 3078F DIAST BP <80 MM HG: CPT | Mod: CPTII,S$GLB,, | Performed by: INTERNAL MEDICINE

## 2023-05-05 PROCEDURE — 99999 PR PBB SHADOW E&M-EST. PATIENT-LVL V: CPT | Mod: PBBFAC,,, | Performed by: INTERNAL MEDICINE

## 2023-05-05 PROCEDURE — 80053 COMPREHEN METABOLIC PANEL: CPT | Performed by: INTERNAL MEDICINE

## 2023-05-05 PROCEDURE — 1159F PR MEDICATION LIST DOCUMENTED IN MEDICAL RECORD: ICD-10-PCS | Mod: CPTII,S$GLB,, | Performed by: INTERNAL MEDICINE

## 2023-05-05 PROCEDURE — 3008F BODY MASS INDEX DOCD: CPT | Mod: CPTII,S$GLB,, | Performed by: INTERNAL MEDICINE

## 2023-05-05 PROCEDURE — 3074F SYST BP LT 130 MM HG: CPT | Mod: CPTII,S$GLB,, | Performed by: INTERNAL MEDICINE

## 2023-05-05 PROCEDURE — 99214 PR OFFICE/OUTPT VISIT, EST, LEVL IV, 30-39 MIN: ICD-10-PCS | Mod: S$GLB,,, | Performed by: INTERNAL MEDICINE

## 2023-05-05 PROCEDURE — 99214 OFFICE O/P EST MOD 30 MIN: CPT | Mod: S$GLB,,, | Performed by: INTERNAL MEDICINE

## 2023-05-05 RX ORDER — ANASTROZOLE 1 MG/1
1 TABLET ORAL DAILY
Qty: 90 TABLET | Refills: 6 | Status: SHIPPED | OUTPATIENT
Start: 2023-05-05 | End: 2023-10-06 | Stop reason: SDUPTHER

## 2023-05-05 NOTE — PROGRESS NOTES
PROGRESS NOTE    Subjective:       Patient ID: Ksenia Arce is a 47 y.o. female.  MRN: [id number]  : 1975    Chief Complaint: Left Breast cancer     History of Present Illness:   Ksenia Arce is a 47 y.o. female who presents with  breast cancer. See Oncology history for details.      She has been on Anastrozole since the end of .  Diagnosed in 2016, s/p neoadjuvant chemotherapy.   She had a left mastectomy with reconstruction with flap reconstruction, then underwent RT.    Interim history:  She presents to discuss recent imaging.   Had a screening mammogram that was incomplete, followed by diagnostic right breast mammogram and US imaging for further evaluation.Results were discussed, no abnormality was found.    Reports stress associated with the need for additional imaging.   Has stable joint pains and neuropathy.      Oncology History:     As per outside notes, Dr. Armstrong  [facility] upper inner quadrant of left breast [id number] stage IIIA, ER positive, OR positive, HER2 negative, dx in   Neoadjuvant chemotherapy ddAC followed by weekly taxol, final path showed residual disease in breast and  LN  Treated with adjuvant radiation  Initially treated with Lupron and Arimidex, started end of .  After GOPAL/BSO, Lupron was discontinued approximately 2018  This spring is 5 years of an AI. Discussed extending therapy. Considering her pathology, I think this is reasonable with follow up of bone density     FH: Paternal GM had breast cancer in her 70's      Genetics reportedly negative in   Oncology History   Malignant neoplasm of left breast   2016 Initial Diagnosis    Malignant neoplasm of left breast       3/18/2022 Cancer Staged    Staging form: Breast, AJCC 8th Edition  - Clinical: cT2, cN2, cM0, ER+, OR+, HER2-           History:  Past Medical History:   Diagnosis Date    Breast cancer 2016    ER+ [facility]    History of antineoplastic  chemotherapy 2016    6/30/2016-11/16/2016    History of therapeutic radiation 2017    left chest, completed 5/10/17, dose 9500 cGy    Mental disorder     Migraine headache     Neuropathy       Past Surgical History:   Procedure Laterality Date    BREAST CAPSULECTOMY      BREAST RECONSTRUCTION      COLONOSCOPY N/A 09/17/2021    Procedure: COLONOSCOPY;  Surgeon: Diony Montes MD;  Location: [facility];  Service: Endoscopy;  Laterality: N/A;    MASTECTOMY Left 12/26/2016    TONSILLECTOMY      TOTAL ABDOMINAL HYSTERECTOMY W/ BILATERAL SALPINGOOPHORECTOMY  2018     Family History   Problem Relation Age of Onset    Colon polyps Mother     Crohn's disease Mother     Clotting disorder Mother     Heart disease Father     Breast cancer Maternal Cousin 59        stage 0    Breast cancer Paternal Grandmother 72     Social History     Tobacco Use    Smoking status: Never    Smokeless tobacco: Never   Substance and Sexual Activity    Alcohol use: Yes    Drug use: Never    Sexual activity: Yes     Partners: Male        ROS:   Review of Systems   Constitutional:  Negative for fever, malaise/fatigue and weight loss.   HENT:  Negative for congestion, hearing loss, nosebleeds and sore throat.    Eyes:  Negative for double vision and photophobia.   Respiratory:  Negative for cough, hemoptysis, sputum production, shortness of breath and wheezing.    Cardiovascular:  Negative for chest pain, palpitations, orthopnea and leg swelling.   Gastrointestinal:  Negative for abdominal pain, blood in stool, constipation, diarrhea, heartburn, nausea and vomiting.   Genitourinary:  Negative for dysuria, hematuria and urgency.   Musculoskeletal:  Positive for back pain. Negative for joint pain and myalgias.   Skin:  Negative for itching and rash.   Neurological:  Positive for sensory change. Negative for dizziness, tingling, seizures, weakness and headaches.   Endo/Heme/Allergies:  Negative for polydipsia. Does not bruise/bleed easily.  "  Psychiatric/Behavioral:  Negative for depression and memory loss. The patient is nervous/anxious. The patient does not have insomnia.       Objective:     Vitals:    05/05/23 1333   BP: 118/77   Pulse: 80   Resp: 20   SpO2: 99%   Weight: 68.1 kg (150 lb 0.4 oz)   Height: 5' 5" (1.651 m)   PainSc: 0-No pain       Wt Readings from Last 10 Encounters:   05/05/23 68.1 kg (150 lb 0.4 oz)   03/21/23 67.1 kg (148 lb)   02/24/23 69 kg (152 lb 1.9 oz)   02/09/23 69.3 kg (152 lb 12.5 oz)   11/23/22 71.7 kg (158 lb)   11/17/22 72.1 kg (158 lb 15.2 oz)   10/20/22 70 kg (154 lb 5.2 oz)   09/30/22 69.5 kg (153 lb 3.5 oz)   06/24/22 69.4 kg (153 lb 1.8 oz)   03/18/22 68.2 kg (150 lb 7.4 oz)       Physical Examination:   Physical Exam  Vitals and nursing note reviewed.   Constitutional:       General: She is not in acute distress.     Appearance: She is not diaphoretic.   HENT:      Head: Normocephalic.      Mouth/Throat:      Pharynx: No oropharyngeal exudate.   Eyes:      General: No scleral icterus.     Conjunctiva/sclera: Conjunctivae normal.   Neck:      Thyroid: No thyromegaly.   Cardiovascular:      Rate and Rhythm: Normal rate and regular rhythm.      Heart sounds: Normal heart sounds. No murmur heard.  Pulmonary:      Effort: Pulmonary effort is normal. No respiratory distress.      Breath sounds: No stridor. No wheezing or rales.   Chest:      Chest wall: No tenderness.   Abdominal:      General: Bowel sounds are normal. There is no distension.      Palpations: Abdomen is soft. There is no mass.      Tenderness: There is no abdominal tenderness. There is no rebound.   Musculoskeletal:         General: No tenderness or deformity. Normal range of motion.      Cervical back: Neck supple.   Lymphadenopathy:      Cervical: No cervical adenopathy.   Skin:     General: Skin is warm and dry.      Findings: No erythema or rash.      Comments: Left mastectomy with flap reconstruction. No breast mass or axillary adenopathy " bilaterally    Neurological:      Mental Status: She is alert and oriented to person, place, and time.      Cranial Nerves: No cranial nerve deficit.      Coordination: Coordination normal.      Gait: Gait is intact.   Psychiatric:         Mood and Affect: Affect normal.         Cognition and Memory: Memory normal.         Judgment: Judgment normal.        Diagnostic Tests:  Significant Imaging: I have reviewed and interpreted all pertinent imaging results/findings.    Laboratory Data:  All pertinent labs have been reviewed.  Labs:   Lab Results   Component Value Date    WBC 5.67 09/30/2022    RBC 4.79 09/30/2022    HGB 14.3 09/30/2022    HCT 41.6 09/30/2022    MCV 87 09/30/2022     09/30/2022    GLU 88 09/30/2022     09/30/2022    K 4.3 09/30/2022    BUN 16 09/30/2022    CREATININE 0.7 09/30/2022    AST 24 09/30/2022    ALT 44 09/30/2022    BILITOT 0.8 09/30/2022       Assessment/Plan:   Malignant neoplasm of upper-inner quadrant of left female breast, unspecified estrogen receptor status  Secondary adenocarcinoma of lymph node  Use of anastrozole    [facility] upper inner quadrant of left breast [id number] stage IIIA, ER positive, [place] positive, HER2 negative, dx in 2016     Continue anastrozole, recommend 10 years of treatment as tolerated given node positive disease. Continue supportive care as discussed below.     Continue active surveillance. Mammogram normal.   Alternated every 6 months MRI and mammogram. Next screening MRI is due in September.     AI related Arthralgias   Referred to IO to discuss acupuncture.     Osteopenia of multiple sites  Continue Prolia q 6 months. Due at this time, will schedule.   Next Dexa scan is due in March 2024.     Peripheral neuropathy due to chemotherapy  Continue gabapentin.     Anxiety   On Fluoxetine and lorazepam. Refer to onc-psych.        ECOG SCORE    0 - Fully active-able to carry on all pre-disease performance without restriction           Discussion:   No  follow-ups on file.    Plan was discussed with the patient at length, and she verbalized understanding. Ksenia was given an opportunity to ask questions that were answered to her satisfaction, and she was advised to call in the interval if any problems or questions arise.    Electronically signed by Kandy Coles MD    Route Chart for Scheduling    Med Onc Chart Routing      Follow up with physician . Refer to integrative onc and onc-psych asap. see me first week of October after MRI   Follow up with ANNE    Infusion scheduling note    Injection scheduling note Prolia asap   Labs CBC and CMP   Scheduling:  Preferred lab:  Lab interval:  labs today   Imaging MRI   after 9/23   Pharmacy appointment    Other referrals             Therapy Plan Information  Medications  denosumab (PROLIA) injection 60 mg  60 mg, Subcutaneous, Every 26 weeks

## 2023-05-09 ENCOUNTER — TELEPHONE (OUTPATIENT)
Dept: INFUSION THERAPY | Facility: HOSPITAL | Age: 48
End: 2023-05-09
Payer: COMMERCIAL

## 2023-05-09 ENCOUNTER — OFFICE VISIT (OUTPATIENT)
Dept: NEUROLOGY | Facility: CLINIC | Age: 48
End: 2023-05-09
Payer: COMMERCIAL

## 2023-05-09 VITALS
SYSTOLIC BLOOD PRESSURE: 120 MMHG | HEIGHT: 65 IN | BODY MASS INDEX: 24.61 KG/M2 | HEART RATE: 80 BPM | WEIGHT: 147.69 LBS | DIASTOLIC BLOOD PRESSURE: 89 MMHG

## 2023-05-09 DIAGNOSIS — G43.009 MIGRAINE WITHOUT AURA AND WITHOUT STATUS MIGRAINOSUS, NOT INTRACTABLE: Primary | ICD-10-CM

## 2023-05-09 PROCEDURE — 99214 OFFICE O/P EST MOD 30 MIN: CPT | Mod: S$GLB,,, | Performed by: PSYCHIATRY & NEUROLOGY

## 2023-05-09 PROCEDURE — 3008F PR BODY MASS INDEX (BMI) DOCUMENTED: ICD-10-PCS | Mod: CPTII,S$GLB,, | Performed by: PSYCHIATRY & NEUROLOGY

## 2023-05-09 PROCEDURE — 3079F PR MOST RECENT DIASTOLIC BLOOD PRESSURE 80-89 MM HG: ICD-10-PCS | Mod: CPTII,S$GLB,, | Performed by: PSYCHIATRY & NEUROLOGY

## 2023-05-09 PROCEDURE — 99999 PR PBB SHADOW E&M-EST. PATIENT-LVL III: CPT | Mod: PBBFAC,,, | Performed by: PSYCHIATRY & NEUROLOGY

## 2023-05-09 PROCEDURE — 3074F SYST BP LT 130 MM HG: CPT | Mod: CPTII,S$GLB,, | Performed by: PSYCHIATRY & NEUROLOGY

## 2023-05-09 PROCEDURE — 99214 PR OFFICE/OUTPT VISIT, EST, LEVL IV, 30-39 MIN: ICD-10-PCS | Mod: S$GLB,,, | Performed by: PSYCHIATRY & NEUROLOGY

## 2023-05-09 PROCEDURE — 99999 PR PBB SHADOW E&M-EST. PATIENT-LVL III: ICD-10-PCS | Mod: PBBFAC,,, | Performed by: PSYCHIATRY & NEUROLOGY

## 2023-05-09 PROCEDURE — 3008F BODY MASS INDEX DOCD: CPT | Mod: CPTII,S$GLB,, | Performed by: PSYCHIATRY & NEUROLOGY

## 2023-05-09 PROCEDURE — 3074F PR MOST RECENT SYSTOLIC BLOOD PRESSURE < 130 MM HG: ICD-10-PCS | Mod: CPTII,S$GLB,, | Performed by: PSYCHIATRY & NEUROLOGY

## 2023-05-09 PROCEDURE — 3079F DIAST BP 80-89 MM HG: CPT | Mod: CPTII,S$GLB,, | Performed by: PSYCHIATRY & NEUROLOGY

## 2023-05-09 RX ORDER — ATOGEPANT 60 MG/1
60 TABLET ORAL DAILY
Qty: 30 TABLET | Refills: 9 | Status: SHIPPED | OUTPATIENT
Start: 2023-05-09 | End: 2024-03-06 | Stop reason: SDUPTHER

## 2023-05-09 RX ORDER — NAPROXEN SODIUM 550 MG/1
550 TABLET ORAL EVERY 8 HOURS
Qty: 30 TABLET | Refills: 2 | Status: SHIPPED | OUTPATIENT
Start: 2023-05-09

## 2023-05-09 RX ORDER — BUTALBITAL, ACETAMINOPHEN, CAFFEINE AND CODEINE PHOSPHATE 300; 50; 40; 30 MG/1; MG/1; MG/1; MG/1
CAPSULE ORAL
Qty: 12 CAPSULE | Refills: 0 | Status: SHIPPED | OUTPATIENT
Start: 2023-05-09 | End: 2023-11-02 | Stop reason: SDUPTHER

## 2023-05-09 RX ORDER — UBROGEPANT 100 MG/1
100 TABLET ORAL SEE ADMIN INSTRUCTIONS
Qty: 10 TABLET | Refills: 9 | Status: SHIPPED | OUTPATIENT
Start: 2023-05-09 | End: 2024-03-06 | Stop reason: SDUPTHER

## 2023-05-09 NOTE — PROGRESS NOTES
Subjective:       Patient ID: Ksenia Arce is a 47 y.o. female.    Chief Complaint: Migraine      HPI  Past Medical History:   Diagnosis Date    Breast cancer 05/2016    ER+ [facility]    History of antineoplastic chemotherapy 2016    6/30/2016-11/16/2016    History of therapeutic radiation 2017    left chest, completed 5/10/17, dose 9500 cGy    Mental disorder     Migraine headache     Neuropathy       Past Surgical History:   Procedure Laterality Date    BREAST CAPSULECTOMY      BREAST RECONSTRUCTION      COLONOSCOPY N/A 09/17/2021    Procedure: COLONOSCOPY;  Surgeon: Diony Montes MD;  Location: [facility];  Service: Endoscopy;  Laterality: N/A;    MASTECTOMY Left 12/26/2016    TONSILLECTOMY      TOTAL ABDOMINAL HYSTERECTOMY W/ BILATERAL SALPINGOOPHORECTOMY  2018        Current Outpatient Medications:     albuterol (PROVENTIL HFA) 90 mcg/actuation inhaler, Inhale 2 puffs into the lungs every 6 (six) hours as needed for Wheezing. Rescue, Disp: 18 g, Rfl: 0    anastrozole (ARIMIDEX) 1 mg Tab, Take 1 tablet (1 mg total) by mouth once daily., Disp: 90 tablet, Rfl: 6    atogepant (QULIPTA) 60 mg Tab, Take 1 tablet (60 mg) by mouth once daily., Disp: 30 tablet, Rfl: 9    atorvastatin (LIPITOR) 40 MG tablet, Take 1 tablet (40 mg total) by mouth once daily., Disp: 90 tablet, Rfl: 2    butalbitaL-acetaminop-caf-cod (FIORICET WITH CODEINE) -81-30 mg Cap, Take 1 capsule by mouth every 6 hours as needed for migraine, Disp: 12 capsule, Rfl: 0    FLUoxetine 40 MG capsule, Take 1 capsule (40 mg total) by mouth every morning., Disp: 90 capsule, Rfl: 0    gabapentin (NEURONTIN) 300 MG capsule, Take 1 capsule (300 mg total) by mouth 3 (three) times daily., Disp: 270 capsule, Rfl: 1    LORazepam (ATIVAN) 0.5 MG tablet, Take 1 tablet (0.5 mg total) by mouth daily as needed for Anxiety., Disp: 30 tablet, Rfl: 0    naproxen sodium (ANAPROX) 550 MG tablet, Take 1 tablet (550 mg total) by mouth every 8 (eight) hours., Disp: 30  tablet, Rfl: 2    ondansetron (ZOFRAN) 4 MG tablet, Take 2 tablets (8 mg total) by mouth every 8 (eight) hours as needed for Nausea., Disp: 21 tablet, Rfl: 0    promethazine-dextromethorphan (PROMETHAZINE-DM) 6.25-15 mg/5 mL Syrp, Take 5 ml by mouth every 4 to 6 hours as needed for cough, Disp: 120 mL, Rfl: 0    scopolamine (TRANSDERM-SCOP) 1.3-1.5 mg (1 mg over 3 days), Place 1 patch onto the skin every 72 hours., Disp: 6 patch, Rfl: 1    ubrogepant (UBRELVY) 100 mg tablet, Take 1 tablet (100 mg total) by mouth As instructed for Migraine. May repeat once , after 2 hours. No more than 2 in 24 hours, Disp: 10 tablet, Rfl: 9   Review of patient's allergies indicates:  No Known Allergies     Review of Systems        Objective:      Physical Exam  Neurological:      Mental Status: She is alert. Mental status is at baseline.         Assessment:       1. Migraine without aura and without status migrainosus, not intractable        Plan:          Episodic frequent migraine, frequency reduced from 12-15 a month now to 7 a month with Quilipta  She is satisfied with her abortive regimen.  Has taken only 2 of the Fioricet with codeine.      Can continue Quilipta, PRN Ubrelvy with Fioricet with codeine on rare occasions.  [place] I provided a refill for her.              Shadia Valdivia MD   06/25/2023   1:06 PM

## 2023-05-12 ENCOUNTER — INFUSION (OUTPATIENT)
Dept: INFUSION THERAPY | Facility: HOSPITAL | Age: 48
End: 2023-05-12
Payer: COMMERCIAL

## 2023-05-12 DIAGNOSIS — M85.89 OSTEOPENIA OF MULTIPLE SITES: Primary | ICD-10-CM

## 2023-05-12 PROCEDURE — 63600175 PHARM REV CODE 636 W HCPCS: Mod: JZ,JG | Performed by: INTERNAL MEDICINE

## 2023-05-12 PROCEDURE — 96372 THER/PROPH/DIAG INJ SC/IM: CPT

## 2023-05-12 RX ADMIN — DENOSUMAB 60 MG: 60 INJECTION SUBCUTANEOUS at 09:05

## 2023-05-12 NOTE — NURSING
Presents to clinic for Prolia injection w/o acute complaints. Denies jaw pain, recent/upcoming dental procedures. UTD on Ca/VitD supp. Medication administered per orders. Discharged in NAD  
- - -

## 2023-05-18 ENCOUNTER — OFFICE VISIT (OUTPATIENT)
Dept: PRIMARY CARE CLINIC | Facility: CLINIC | Age: 48
End: 2023-05-18
Payer: COMMERCIAL

## 2023-05-18 ENCOUNTER — PATIENT MESSAGE (OUTPATIENT)
Dept: PRIMARY CARE CLINIC | Facility: CLINIC | Age: 48
End: 2023-05-18

## 2023-05-18 VITALS
DIASTOLIC BLOOD PRESSURE: 68 MMHG | WEIGHT: 150.56 LBS | HEIGHT: 65 IN | BODY MASS INDEX: 25.08 KG/M2 | HEART RATE: 85 BPM | OXYGEN SATURATION: 99 % | SYSTOLIC BLOOD PRESSURE: 118 MMHG

## 2023-05-18 DIAGNOSIS — G62.0 PERIPHERAL NEUROPATHY DUE TO CHEMOTHERAPY: ICD-10-CM

## 2023-05-18 DIAGNOSIS — E78.5 HYPERLIPIDEMIA, UNSPECIFIED HYPERLIPIDEMIA TYPE: ICD-10-CM

## 2023-05-18 DIAGNOSIS — R73.09 ELEVATED GLUCOSE: ICD-10-CM

## 2023-05-18 DIAGNOSIS — F41.1 GENERALIZED ANXIETY DISORDER: ICD-10-CM

## 2023-05-18 DIAGNOSIS — Z00.00 WELLNESS EXAMINATION: Primary | ICD-10-CM

## 2023-05-18 DIAGNOSIS — T45.1X5A PERIPHERAL NEUROPATHY DUE TO CHEMOTHERAPY: ICD-10-CM

## 2023-05-18 DIAGNOSIS — T75.3XXA MOTION SICKNESS, INITIAL ENCOUNTER: ICD-10-CM

## 2023-05-18 PROCEDURE — 99396 PREV VISIT EST AGE 40-64: CPT | Mod: S$GLB,,, | Performed by: INTERNAL MEDICINE

## 2023-05-18 PROCEDURE — 3078F PR MOST RECENT DIASTOLIC BLOOD PRESSURE < 80 MM HG: ICD-10-PCS | Mod: CPTII,S$GLB,, | Performed by: INTERNAL MEDICINE

## 2023-05-18 PROCEDURE — 1159F PR MEDICATION LIST DOCUMENTED IN MEDICAL RECORD: ICD-10-PCS | Mod: CPTII,S$GLB,, | Performed by: INTERNAL MEDICINE

## 2023-05-18 PROCEDURE — 1159F MED LIST DOCD IN RCRD: CPT | Mod: CPTII,S$GLB,, | Performed by: INTERNAL MEDICINE

## 2023-05-18 PROCEDURE — 3008F PR BODY MASS INDEX (BMI) DOCUMENTED: ICD-10-PCS | Mod: CPTII,S$GLB,, | Performed by: INTERNAL MEDICINE

## 2023-05-18 PROCEDURE — 99999 PR PBB SHADOW E&M-EST. PATIENT-LVL III: CPT | Mod: PBBFAC,,, | Performed by: INTERNAL MEDICINE

## 2023-05-18 PROCEDURE — 3074F PR MOST RECENT SYSTOLIC BLOOD PRESSURE < 130 MM HG: ICD-10-PCS | Mod: CPTII,S$GLB,, | Performed by: INTERNAL MEDICINE

## 2023-05-18 PROCEDURE — 3078F DIAST BP <80 MM HG: CPT | Mod: CPTII,S$GLB,, | Performed by: INTERNAL MEDICINE

## 2023-05-18 PROCEDURE — 99396 PR PREVENTIVE VISIT,EST,40-64: ICD-10-PCS | Mod: S$GLB,,, | Performed by: INTERNAL MEDICINE

## 2023-05-18 PROCEDURE — 3074F SYST BP LT 130 MM HG: CPT | Mod: CPTII,S$GLB,, | Performed by: INTERNAL MEDICINE

## 2023-05-18 PROCEDURE — 3008F BODY MASS INDEX DOCD: CPT | Mod: CPTII,S$GLB,, | Performed by: INTERNAL MEDICINE

## 2023-05-18 PROCEDURE — 99999 PR PBB SHADOW E&M-EST. PATIENT-LVL III: ICD-10-PCS | Mod: PBBFAC,,, | Performed by: INTERNAL MEDICINE

## 2023-05-18 RX ORDER — SCOLOPAMINE TRANSDERMAL SYSTEM 1 MG/1
1 PATCH, EXTENDED RELEASE TRANSDERMAL
Qty: 6 PATCH | Refills: 1 | Status: SHIPPED | OUTPATIENT
Start: 2023-05-18 | End: 2023-08-29

## 2023-05-18 RX ORDER — FLUOXETINE HYDROCHLORIDE 40 MG/1
40 CAPSULE ORAL EVERY MORNING
Qty: 90 CAPSULE | Refills: 0 | Status: SHIPPED | OUTPATIENT
Start: 2023-05-18 | End: 2023-09-26 | Stop reason: SDUPTHER

## 2023-05-18 RX ORDER — LORAZEPAM 0.5 MG/1
0.5 TABLET ORAL DAILY PRN
Qty: 30 TABLET | Refills: 0 | Status: SHIPPED | OUTPATIENT
Start: 2023-05-18

## 2023-05-18 NOTE — PROGRESS NOTES
[facility] Note    Chief Complaint      Chief Complaint   Patient presents with    Annual Exam     History of Present Illness      Ksenia Arce is a 47 y.o. female who presents today for chief complaint annual wellness.     HPI   Saw dr traore in May h/o breast cancer  Prolia last week   Saw Neuro Dr Valdivia changed her migraine meds she in now on ubrelvy and qulipta, sympotms are much ebtter, she is doing well. Has fioricet for breakthrough   She enrolle din dig HLD THINKS HE HAVING MYOPATHY will pause lipitor and if pain imporved change to crestor   DEXA 3.22  Was seeing [facility] but will stop seeing so would like me to fill her prozac and prn ativan, she is going back to psych at [facility]   She is on lipitro for HLD     She thinks she has plantar fascitis, has right heep pain in the morning, she has a plantar fascitis sock which helps   Saw león for ho breast cancer   Saw dr roberts and had calvin who recommended about genetci testing   Seeing Neuro in Jan for headache emgality is not working,  has not had eye exam,   She is on nurtec rescue but trying to use more often, had done botox in the past   She has a sore throat from today, no fever, no cough      Breast cancer - had mri 93.22 alternated q 6 mo with mmg, is on arimidex plans to complete 8 years , sees León only , had seen kris in th epast.   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    Hyperlipidemia 01/31/2022    Diarrhea 09/17/2021    Advice given about COVID-19 virus infection 01/28/2021     Psoriasis 07/29/2020    Malignant neoplasm of left breast 01/23/2020     lobualr breast cancer of L breast dx in 5/2016 found on BSE, felt mass, had us and MMG was told was cyst but 2/2 pain sought eval by Dr surgeon Sr Lopez who did bx  -had mastectomy on L side and LN dissection , was in 6/9, chemo mastectomy radiation, taxol and doxirubicin, radiation x36, completed in 5/17, has been on arimidex since then, will beon for 10 total   -gets mmg and us q6 mos [facility]ner  Onc Julia Armstrong - she follows DEXA  Br Sue[facility] [place]  Gyn Sy   Plastics [facility]   Had brca testing which was negative, er positive, her2 neg         Migraines 01/23/2020    Generalized anxiety disorder 01/23/2020     On daily prozac, sees adam flores, uses prn ativan 2-3x a month       Herpes simplex 01/23/2020     Takes valcyte prn outbreak, oral lesions      History of total abdominal hysterectomy 01/23/2020     S/p with Sy          Health Maintenance   Topic Date Due    Mammogram  05/01/2024    TETANUS VACCINE  06/01/2025    Lipid Panel  03/21/2028    Hepatitis C Screening  Completed       Past Medical History:   Diagnosis Date    Breast cancer 05/2016    ER+ ILC    History of antineoplastic chemotherapy 2016    6/30/2016-11/16/2016    History of therapeutic radiation 2017    left chest, completed 5/10/17, dose 9500 cGy    Mental disorder     Migraine headache     Neuropathy        Past Surgical History:   Procedure Laterality Date    BREAST CAPSULECTOMY      BREAST RECONSTRUCTION      COLONOSCOPY N/A 09/17/2021    Procedure: COLONOSCOPY;  Surgeon: Diony Montes MD;  Location: [facility];  Service: Endoscopy;  Laterality: N/A;    MASTECTOMY Left 12/26/2016    TONSILLECTOMY      TOTAL ABDOMINAL HYSTERECTOMY W/ BILATERAL SALPINGOOPHORECTOMY  2018       family history includes Breast cancer (age of onset: 59) in her maternal cousin; Breast cancer (age of onset: 72) in her paternal grandmother; Clotting disorder in her mother;  Colon polyps in her mother; Coronary artery disease in her mother; Crohn's disease in her mother; Heart disease in her father.    Social History     Tobacco Use    Smoking status: Never     Passive exposure: Never    Smokeless tobacco: Never   Substance Use Topics    Alcohol use: Yes    Drug use: Never       Review of Systems   Constitutional:  Negative for chills, fever, malaise/fatigue and weight loss.   Respiratory:  Negative for cough, sputum production, shortness of breath and wheezing.    Cardiovascular:  Negative for chest pain, palpitations, orthopnea and leg swelling.   Gastrointestinal:  Negative for constipation, diarrhea, nausea and vomiting.   Genitourinary:  Negative for dysuria, frequency, hematuria and urgency.      Outpatient Encounter Medications as of 5/18/2023   Medication Sig Dispense Refill    albuterol (PROVENTIL HFA) 90 mcg/actuation inhaler Inhale 2 puffs into the lungs every 6 (six) hours as needed for Wheezing. Rescue 18 g 0    anastrozole (ARIMIDEX) 1 mg Tab Take 1 tablet (1 mg total) by mouth once daily. 90 tablet 6    atogepant (QULIPTA) 60 mg Tab Take 1 tablet (60 mg) by mouth once daily. 30 tablet 9    atorvastatin (LIPITOR) 40 MG tablet Take 1 tablet (40 mg total) by mouth once daily. 90 tablet 2    butalbitaL-acetaminop-caf-cod (FIORICET WITH CODEINE) -45-30 mg Cap Take 1 capsule by mouth every 6 hours as needed for migraine 12 capsule 0    gabapentin (NEURONTIN) 300 MG capsule Take 1 capsule (300 mg total) by mouth 3 (three) times daily. 270 capsule 1    naproxen sodium (ANAPROX) 550 MG tablet Take 1 tablet (550 mg total) by mouth every 8 (eight) hours. 30 tablet 2    ondansetron (ZOFRAN) 4 MG tablet Take 2 tablets (8 mg total) by mouth every 8 (eight) hours as needed for Nausea. 21 tablet 0    promethazine-dextromethorphan (PROMETHAZINE-DM) 6.25-15 mg/5 mL Syrp Take 5 ml by mouth every 4 to 6 hours as needed for cough 120 mL 0    ubrogepant (UBRELVY) 100 mg tablet Take 1  "tablet (100 mg total) by mouth As instructed for Migraine. May repeat once , after 2 hours. No more than 2 in 24 hours 10 tablet 9    [DISCONTINUED] FLUoxetine 40 MG capsule Take 1 capsule (40 mg total) by mouth every morning. 90 capsule 0    [DISCONTINUED] LORazepam (ATIVAN) 0.5 MG tablet Take 1 tablet by mouth daily as needed.      FLUoxetine 40 MG capsule Take 1 capsule (40 mg total) by mouth every morning. 90 capsule 0    LORazepam (ATIVAN) 0.5 MG tablet Take 1 tablet (0.5 mg total) by mouth daily as needed for Anxiety. 30 tablet 0    scopolamine (TRANSDERM-SCOP) 1.3-1.5 mg (1 mg over 3 days) Place 1 patch onto the skin every 72 hours. 6 patch 1    [DISCONTINUED] anastrozole (ARIMIDEX) 1 mg Tab Take 1 tablet (1 mg total) by mouth once daily. 90 tablet 6    [DISCONTINUED] atogepant (QULIPTA) 60 mg Tab Take 1 tablet (60 mg) by mouth once daily. 30 tablet 5    [DISCONTINUED] atorvastatin (LIPITOR) 40 MG tablet Take 1 tablet (40 mg total) by mouth once daily. 90 tablet 1    [DISCONTINUED] butalbitaL-acetaminop-caf-cod (FIORICET WITH CODEINE) -76-30 mg Cap Take 1 capsule by mouth every 6 hours as needed for migraine 8 capsule 0    [DISCONTINUED] naproxen sodium (ANAPROX) 550 MG tablet Take 1 tablet (550 mg total) by mouth every 8 (eight) hours. 30 tablet 1    [DISCONTINUED] ubrogepant (UBRELVY) 100 mg tablet Take 1 tablet (100 mg total) by mouth As instructed for Migraine. May repeat once , after 2 hours. No more than 2 in 24 hours 16 tablet 5     No facility-administered encounter medications on file as of 5/18/2023.        Review of patient's allergies indicates:  No Known Allergies        Physical Exam      Vital Signs  Pulse: 85  SpO2: 99 %  BP: 118/68  BP Location: Right arm  Patient Position: Sitting  Height and Weight  Height: 5' 5" (165.1 cm)  Weight: 68.3 kg (150 lb 9.2 oz)  BSA (Calculated - sq m): 1.77 sq meters  BMI (Calculated): 25.1  Weight in (lb) to have BMI = 25: 149.9]    Physical Exam  Vitals " reviewed.   Constitutional:       General: She is not in acute distress.     Appearance: She is well-developed. She is not diaphoretic.   HENT:      Head: Normocephalic and atraumatic.      Right Ear: External ear normal.      Left Ear: External ear normal.      Nose: Nose normal.   Eyes:      General:         Right eye: No discharge.         Left eye: No discharge.      Conjunctiva/sclera: Conjunctivae normal.   Pulmonary:      Effort: Pulmonary effort is normal. No respiratory distress.   Musculoskeletal:         General: Normal range of motion.      Cervical back: Normal range of motion.   Skin:     Coloration: Skin is not pale.      Findings: No rash.   Neurological:      Mental Status: She is alert and oriented to person, place, and time.   Psychiatric:         Behavior: Behavior normal.         Thought Content: Thought content normal.        Laboratory:  CBC:  Recent Labs   Lab Result Units 05/05/23  1412   WBC K/uL 5.35   RBC M/uL 4.90   Hemoglobin g/dL 14.5   Hematocrit % 42.9   Platelets K/uL 253   MCV fL 88   MCH pg 29.6   MCHC g/dL 33.8     CMP:  Recent Labs   Lab Result Units 05/05/23  1412   Glucose mg/dL 85   Calcium mg/dL 10.1   Albumin g/dL 4.2   Total Protein g/dL 7.2   Sodium mmol/L 141   Potassium mmol/L 4.9   CO2 mmol/L 27   Chloride mmol/L 106   BUN mg/dL 19   Alkaline Phosphatase U/L 79   ALT U/L 68*   AST U/L 29   Total Bilirubin mg/dL 0.7     URINALYSIS:  No results for input(s): COLORU, CLARITYU, SPECGRAV, PHUR, PROTEINUA, GLUCOSEU, BILIRUBINCON, BLOODU, WBCU, RBCU, BACTERIA, MUCUS, NITRITE, LEUKOCYTESUR, UROBILINOGEN, HYALINECASTS in the last 2160 hours.   LIPIDS:  No results for input(s): TSH, HDL, CHOL, TRIG, LDLCALC, CHOLHDL, NONHDLCHOL, TOTALCHOLEST in the last 2160 hours.  TSH:  No results for input(s): TSH in the last 2160 hours.  A1C:  No results for input(s): HGBA1C in the last 2160 hours.    Radiology:      Assessment/Plan     Ksenia Arce is a 47 y.o.female with:    1. Wellness  examination  -     Comprehensive Metabolic Panel; Future; Expected date: 05/18/2023  -     CBC Without Differential; Future; Expected date: 05/18/2023    2. Generalized anxiety disorder  Overview:  On daily prozac, mar flores, uses prn ativan 2-3x a month     Orders:  -     FLUoxetine 40 MG capsule; Take 1 capsule (40 mg total) by mouth every morning.  Dispense: 90 capsule; Refill: 0  -     LORazepam (ATIVAN) 0.5 MG tablet; Take 1 tablet (0.5 mg total) by mouth daily as needed for Anxiety.  Dispense: 30 tablet; Refill: 0    3. Motion sickness, initial encounter  -     scopolamine (TRANSDERM-SCOP) 1.3-1.5 mg (1 mg over 3 days); Place 1 patch onto the skin every 72 hours.  Dispense: 6 patch; Refill: 1    4. Hyperlipidemia, unspecified hyperlipidemia type  -     Lipid Panel; Future; Expected date: 05/18/2023    5. Elevated glucose  -     Hemoglobin A1C; Future; Expected date: 05/18/2023    6. Peripheral neuropathy due to chemotherapy  Assessment & Plan:  No issues            Use of the [employer] [facility] [employer] Patient Portal discussed and encouraged during today's visit  -Continue current medications and maintain follow up with specialists.  Return to clinic in 6 months .  Future Appointments   Date Time Provider Department Center   7/18/2023  8:15 AM Partha Garcia Jr., DPM [facility] [place]   7/19/2023  8:00 AM Rhonda Aguilar, PhD [facility] CAN [facility] [place]   10/5/2023 11:15 AM [facility] [id number] [facility] MRI [facility]   10/6/2023  1:20 PM Kandy Coles MD [facility] T[facility]   11/20/2023  8:00 AM [facility], [facility] [place]   11/27/2023  1:00 PM Lourdes Felipe MD [facility]       Lourdes Felipe MD  7/9/2023 3:09 PM    Primary Care Internal Medicine

## 2023-06-13 ENCOUNTER — PATIENT MESSAGE (OUTPATIENT)
Dept: HEMATOLOGY/ONCOLOGY | Facility: CLINIC | Age: 48
End: 2023-06-13
Payer: COMMERCIAL

## 2023-06-13 ENCOUNTER — TELEPHONE (OUTPATIENT)
Dept: HEMATOLOGY/ONCOLOGY | Facility: CLINIC | Age: 48
End: 2023-06-13
Payer: COMMERCIAL

## 2023-06-13 NOTE — NURSING
RN Navigator phoned patient regarding Integrative Oncology referral. [place] and prefers a return call next week. RN Navigator placed a reminder. Additionally, portal message sent to patient with Integrative Oncology services and support for review, GLENDA Lopez.

## 2023-06-29 ENCOUNTER — PATIENT MESSAGE (OUTPATIENT)
Dept: HEMATOLOGY/ONCOLOGY | Facility: CLINIC | Age: 48
End: 2023-06-29
Payer: COMMERCIAL

## 2023-06-29 ENCOUNTER — PATIENT MESSAGE (OUTPATIENT)
Dept: PRIMARY CARE CLINIC | Facility: CLINIC | Age: 48
End: 2023-06-29
Payer: COMMERCIAL

## 2023-07-13 DIAGNOSIS — M79.671 RIGHT FOOT PAIN: Primary | ICD-10-CM

## 2023-07-18 ENCOUNTER — OFFICE VISIT (OUTPATIENT)
Dept: PODIATRY | Facility: CLINIC | Age: 48
End: 2023-07-18
Payer: COMMERCIAL

## 2023-07-18 VITALS — RESPIRATION RATE: 18 BRPM | HEIGHT: 65 IN | BODY MASS INDEX: 24.94 KG/M2 | WEIGHT: 149.69 LBS

## 2023-07-18 DIAGNOSIS — M72.2 PLANTAR FASCIITIS OF RIGHT FOOT: Primary | ICD-10-CM

## 2023-07-18 DIAGNOSIS — M21.861 ACQUIRED POSTERIOR EQUINUS OF RIGHT LOWER EXTREMITY: ICD-10-CM

## 2023-07-18 PROCEDURE — 1160F RVW MEDS BY RX/DR IN RCRD: CPT | Mod: CPTII,S$GLB,, | Performed by: PODIATRIST

## 2023-07-18 PROCEDURE — 99203 PR OFFICE/OUTPT VISIT, NEW, LEVL III, 30-44 MIN: ICD-10-PCS | Mod: 25,S$GLB,, | Performed by: PODIATRIST

## 2023-07-18 PROCEDURE — 99203 OFFICE O/P NEW LOW 30 MIN: CPT | Mod: 25,S$GLB,, | Performed by: PODIATRIST

## 2023-07-18 PROCEDURE — 20550 PR INJECT TENDON SHEATH/LIGAMENT: ICD-10-PCS | Mod: RT,S$GLB,, | Performed by: PODIATRIST

## 2023-07-18 PROCEDURE — 1160F PR REVIEW ALL MEDS BY PRESCRIBER/CLIN PHARMACIST DOCUMENTED: ICD-10-PCS | Mod: CPTII,S$GLB,, | Performed by: PODIATRIST

## 2023-07-18 PROCEDURE — 20550 NJX 1 TENDON SHEATH/LIGAMENT: CPT | Mod: RT,S$GLB,, | Performed by: PODIATRIST

## 2023-07-18 PROCEDURE — 3008F PR BODY MASS INDEX (BMI) DOCUMENTED: ICD-10-PCS | Mod: CPTII,S$GLB,, | Performed by: PODIATRIST

## 2023-07-18 PROCEDURE — 99999 PR PBB SHADOW E&M-EST. PATIENT-LVL IV: ICD-10-PCS | Mod: PBBFAC,,, | Performed by: PODIATRIST

## 2023-07-18 PROCEDURE — 3008F BODY MASS INDEX DOCD: CPT | Mod: CPTII,S$GLB,, | Performed by: PODIATRIST

## 2023-07-18 PROCEDURE — 99999 PR PBB SHADOW E&M-EST. PATIENT-LVL IV: CPT | Mod: PBBFAC,,, | Performed by: PODIATRIST

## 2023-07-18 PROCEDURE — 1159F MED LIST DOCD IN RCRD: CPT | Mod: CPTII,S$GLB,, | Performed by: PODIATRIST

## 2023-07-18 PROCEDURE — 1159F PR MEDICATION LIST DOCUMENTED IN MEDICAL RECORD: ICD-10-PCS | Mod: CPTII,S$GLB,, | Performed by: PODIATRIST

## 2023-07-18 RX ORDER — DEXAMETHASONE SODIUM PHOSPHATE 4 MG/ML
4 INJECTION, SOLUTION INTRA-ARTICULAR; INTRALESIONAL; INTRAMUSCULAR; INTRAVENOUS; SOFT TISSUE
Status: DISCONTINUED | OUTPATIENT
Start: 2023-07-18 | End: 2023-07-18 | Stop reason: HOSPADM

## 2023-07-18 RX ADMIN — DEXAMETHASONE SODIUM PHOSPHATE 4 MG: 4 INJECTION, SOLUTION INTRA-ARTICULAR; INTRALESIONAL; INTRAMUSCULAR; INTRAVENOUS; SOFT TISSUE at 08:07

## 2023-07-18 NOTE — PROGRESS NOTES
[facility] PODIATRY  [address] [place] [address], [address]  [facility] [address]  Dept: [phone]  Dept Fax: [phone]    Partha Garcia Jr., DPM     Assessment:   MDM    Coding  1. Plantar fasciitis of right foot  Tendon Sheath      2. Acquired posterior equinus of right lower extremity            Plan:     Tendon Sheath    Date/Time: 7/18/2023 8:15 AM  Performed by: Partha Garcia Jr., DPM  Authorized by: Partha Garcia Jr., DPM     Consent Done?:  Yes (Verbal)  Indications:  Pain  Site marked: the procedure site was marked    Timeout: prior to procedure the correct patient, procedure, and site was verified    Prep: patient was prepped and draped in usual sterile fashion      Local anesthesia used?: Yes    Anesthesia:  Local infiltration  Local anesthetic:  Co-phenylcaine spray and bupivacaine 0.5% without epinephrine  Anesthetic total (ml):  2    Location:  Foot  Foot joint: R PF.  Ultrasonic guidance for needle placement?: No    Needle size:  25 G  Approach:  Medial  Medications:  4 mg dexAMETHasone 4 mg/mL  Patient tolerance:  Patient tolerated the procedure well with no immediate complications    Ksenia was seen today for heel pain.    Diagnoses and all orders for this visit:    Plantar fasciitis of right foot  -     Tendon Sheath    Acquired posterior equinus of right lower extremity        -pt seen, evaluated, and managed  -dx discussed in detail. All questions/concerns addressed  -all tx options discussed. All alternatives, risks, benefits of all txs discussed  -the patient was educated about the diagnosis  -We discussed conservative care options possible including but not limited to shoe wear and/or padding, bracing/strapping, at home ROM, formal PT, medical therapy, injection therapy  - The utilization of NSAIDs can be considered but their benefit has to be tempered against the risk of GI/ concerns  - A steroid injection can be undertaken.  We did discuss the potential  mechanism of action of this shot.  Understanding that multiple injections at the same anatomic site do have deleterious effects on the soft tissue.  Generic risks include: steroid flare (advised to ice if necessary), skin hypo-pgimentation (which can be permanent and unsightly), elevation of blood sugar, subcutaneous atrophy (can be permanent) and infection.   -xr/imaging on way out--> will review at nxt visit  -labs reviewed by me: okf or rx nsaids  -implemented icing/stretching regimen  -heel lifts dispensed        -rxs dispensed: none  -referrals: none  -WB: wbat      Follow up in about 4 weeks (around 8/15/2023).    Subjective:      Patient ID: Ksenia Arce is a 47 y.o. female.    Chief Complaint:   Chief Complaint   Patient presents with    Heel Pain     Right        CC - foot pain: patient presents to the podiatry clinic  with complaint of  right foot pain. Onset of the symptoms was several months ago. Precipitating event: unk. Current symptoms include: ability to bear weight, but with some pain, kita the heel, swelling and worsening symptoms after a period of inactivity. Aggravating factors: walking and certain shoegear. Symptoms have gradually worsened. Patient has had no prior foot problems. Evaluation to date: none. Treatment to date: elastic supporter which is somewhat effective and OTC analgesics which are not very effective. Patients rates pain 7/10 on pain scale.      HPI    Last Podiatry Enc: Visit date not found  Last Enc w/ Me: Visit date not found    Outside reports reviewed: historical medical records.  Family hx: as below  Past Medical History:   Diagnosis Date    Breast cancer 05/2016    ER+ ILC    History of antineoplastic chemotherapy 2016    6/30/2016-11/16/2016    History of therapeutic radiation 2017    left chest, completed 5/10/17, dose 9500 cGy    Mental disorder     Migraine headache     Neuropathy      Past Surgical History:   Procedure Laterality Date    BREAST CAPSULECTOMY       BREAST RECONSTRUCTION      COLONOSCOPY N/A 09/17/2021    Procedure: COLONOSCOPY;  Surgeon: Diony Montes MD;  Location: [facility];  Service: Endoscopy;  Laterality: N/A;    MASTECTOMY Left 12/26/2016    TONSILLECTOMY      TOTAL ABDOMINAL HYSTERECTOMY W/ BILATERAL SALPINGOOPHORECTOMY  2018     Family History   Problem Relation Age of Onset    Colon polyps Mother     Crohn's disease Mother     Clotting disorder Mother     Coronary artery disease Mother     Heart disease Father     Breast cancer Paternal Grandmother 72    Breast cancer Maternal Cousin 59        stage 0     Current Outpatient Medications   Medication Sig Dispense Refill    anastrozole (ARIMIDEX) 1 mg Tab Take 1 tablet (1 mg total) by mouth once daily. 90 tablet 6    atogepant (QULIPTA) 60 mg Tab Take 1 tablet (60 mg) by mouth once daily. 30 tablet 9    atorvastatin (LIPITOR) 40 MG tablet Take 1 tablet (40 mg total) by mouth once daily. 90 tablet 2    butalbitaL-acetaminop-caf-cod (FIORICET WITH CODEINE) -12-30 mg Cap Take 1 capsule by mouth every 6 hours as needed for migraine 12 capsule 0    FLUoxetine 40 MG capsule Take 1 capsule (40 mg total) by mouth every morning. 90 capsule 0    gabapentin (NEURONTIN) 300 MG capsule Take 1 capsule (300 mg total) by mouth 3 (three) times daily. 270 capsule 1    LORazepam (ATIVAN) 0.5 MG tablet Take 1 tablet (0.5 mg total) by mouth daily as needed for Anxiety. 30 tablet 0    naproxen sodium (ANAPROX) 550 MG tablet Take 1 tablet (550 mg total) by mouth every 8 (eight) hours. 30 tablet 2    ondansetron (ZOFRAN) 4 MG tablet Take 2 tablets (8 mg total) by mouth every 8 (eight) hours as needed for Nausea. 21 tablet 0    promethazine-dextromethorphan (PROMETHAZINE-DM) 6.25-15 mg/5 mL Syrp Take 5 ml by mouth every 4 to 6 hours as needed for cough 120 mL 0    scopolamine (TRANSDERM-SCOP) 1.3-1.5 mg (1 mg over 3 days) Place 1 patch onto the skin every 72 hours. 6 patch 1    ubrogepant (UBRELVY) 100 mg tablet Take 1  "tablet (100 mg total) by mouth As instructed for Migraine. May repeat once , after 2 hours. No more than 2 in 24 hours 10 tablet 9    albuterol (PROVENTIL HFA) 90 mcg/actuation inhaler Inhale 2 puffs into the lungs every 6 (six) hours as needed for Wheezing. Rescue 18 g 0     No current facility-administered medications for this visit.     Review of patient's allergies indicates:  No Known Allergies  Social History     Socioeconomic History    Marital status:    Tobacco Use    Smoking status: Never     Passive exposure: Never    Smokeless tobacco: Never   Substance and Sexual Activity    Alcohol use: Yes    Drug use: Never    Sexual activity: Yes     Partners: Male       ROS    REVIEW OF SYSTEMS: Negative as documented below as well as positive findings in bold.       Constitutional  Respiratory  Gastrointestinal  Skin   - Fever - Cough - Heartburn - Rash   - Chills - Spit blood - Nausea - Itching   - Weight Loss - Shortness of breath - Vomiting - Nail pain   - Malaise/Fatigue - Wheezing - Abdominal Pain  Wound/Ulcer   - Weight Gain   - Blood in Stool  Poor wound healing       - Diarrhea          Cardiovascular  Genitourinary  Neurological  HEENT   - Chest Pain - Dysuria - Burning Sensation of feet - Headache   - Palpitations - Hematuria - Tingling / Paresthesia - Congestion   - Pain at night in legs - Flank Pain - Dizziness - Sore Throat   - Cramping   - Tremor - Blurred Vision   - Leg Swelling   - Sensory Change - Double Vision   - Dizzy when standing   - Speech Change - Eye Redness       - Focal Weakness - Dry Eyes       - Loss of Consciousness          Endocrine  Musculoskeletal  Psychiatric   - Cold intolerance - Muscle Pain - Depression   - Heat intolerance - Neck Pain - Insomnia   - Anemia - Joint Pain - Memory Loss   -  Easy bruising, bleeding - Heel pain - Anxiety      Toe Pain        Leg/Ankle/Foot Pain         Objective:     Resp 18   Ht 5' 5" (1.651 m)   Wt 67.9 kg (149 lb 11.1 oz)   LMP " "11/27/2013 Comment: GOPAL  BMI 24.91 kg/m²   Vitals:    07/18/23 0811   Resp: 18   Weight: 67.9 kg (149 lb 11.1 oz)   Height: 5' 5" (1.651 m)   PainSc:   5   PainLoc: Foot       Physical Exam    General Appearance:   Patient appears well developed, well nourished  Patient appears stated age    Psychiatric:   Patient is oriented to time, place, and person.  Patient has appropriate mood and affect    Neck:  Trachea Midline  No visible masses    Respiratory/Ears:  No distress or labored breathing.  Able to differentiate between normal talking voice and whisper.  Able to follow commands    Eyes:  Visual Acuity intact  Lids and conjunctivae normal. No discoloration noted.    Foot Exam  Physical Exam  Ortho Exam  Ortho/SPM Exam  Foot/Ankle Musculoskeletal Exam    R LE exam con't:  V:  DP 2/4, PT 2/4   CRT< 3s to all digits tested   Tibial and popliteal lymph nodes are w/o abnormality   Edema: absent, varicosities: absent    N:  Patient displays normal ankle reflexes   SILT in SP/DP/T/Mary/Saph distributions    Ortho: +Motor EHL/FHL/TA/GA   equinus deformity present  There is moderate pain with palpation of R medial calcaneal tubercle  Compartments soft/compressible. No pain on passive stretch of big toe. No calf  Pain.    Derm:  skin intact, skin warm and dry, skin without ulcers or lesions, skin without induration, nails normal, texture turgor well hydrated      Imaging / Labs:      X-Ray Foot Complete Right    Result Date: 7/17/2023  EXAMINATION: XR FOOT COMPLETE 3 VIEW RIGHT CLINICAL HISTORY: . Pain in right foot TECHNIQUE: AP, lateral, and oblique views of the foot were performed. COMPARISON: None FINDINGS: There is no evidence to suggest acute fracture or dislocation.  The joint spaces appear to be relatively well maintained.  Small dorsal and plantar calcaneal enthesophytes are noted.     As above Electronically signed by: Jackson Velasquez DO Date:    07/17/2023 Time:    08:51        Note: This was dictated using a " computer transcription program. Although proofread, it may contain computer transcription errors and phonetic errors. Other human proofreading errors may also exist. Corrections may be performed at a later time. Please contact us for any clarification if needed.    Partha Garcia DPM  [facility]

## 2023-07-18 NOTE — PATIENT INSTRUCTIONS
Heel Pain (Plantar Fasciitis)        Heel pain is most often caused by plantar fasciitis, a condition that is sometimes also called heel spur syndrome when a spur is present. Heel pain may also be due to other causes, such as a stress fracture, tendonitis, arthritis, nerve irritation or, rarely, a cyst.    Because there are several potential causes, it is important to have heel pain properly diagnosed. A foot and ankle surgeon is able to distinguish between all the possibilities and to determine the underlying source of your heel pain.    What Is Plantar Fasciitis?  Heel pain is often caused by plantar fasciitis  Plantar fasciitis is an inflammation of the band of tissue (the plantar fascia) that extends from the heel to the toes. In this condition, the fascia first becomes irritated and then inflamed, resulting in heel pain.    Causes  The most common cause of plantar fasciitis relates to faulty structure of the foot. For example, people who have problems with their arches, either overly flat feet or high-arched feet, are more prone to developing plantar fasciitis.    Wearing nonsupportive footwear on hard, flat surfaces puts abnormal strain on the plantar fascia and can also lead to plantar fasciitis. This is particularly evident when ones job requires long hours on the feet. Obesity and overuse may also contribute to plantar fasciitis.    Symptoms  The symptoms of plantar fasciitis are:    Pain on the bottom of the heel  Pain in the arch of the foot  Pain that is usually worse upon arising  Pain that increases over a period of months  Swelling on the bottom of the heel     People with plantar fasciitis often describe the pain as worse when they get up in the morning or after they have been sitting for long periods of time. After a few minutes of walking, the pain decreases because walking stretches the fascia. For some people, the pain subsides but returns after spending long periods of time on their  feet.    Diagnosis  To arrive at a diagnosis, the foot and ankle surgeon will obtain your medical history and examine your foot. Throughout this process, the surgeon rules out all possible causes for your heel pain other than plantar fasciitis.    In addition, diagnostic imaging studies, such as x-rays or other imaging modalities, may be used to distinguish the different types of heel pain. Sometimes heel spurs are found in patients with plantar fasciitis, but these are rarely a source of pain. When they are present, the condition may be diagnosed as plantar fasciitis/heel spur syndrome.    Nonsurgical Treatment  Treatment of plantar fasciitis begins with first-line strategies, which you can begin at home:    -Stretching exercises. Exercises that stretch out the calf muscles help ease pain and assist with recovery.  -Avoid going barefoot. When you walk without shoes, you put undue strain and stress on your plantar fascia.  -Ice. Putting an ice pack on your heel for 20 minutes several times a day helps reduce inflammation. Place a thin towel between the ice and your heel; do not apply ice directly to the skin.  -Limit activities. Cut down on extended physical activities to give your heel a rest.  -Shoe modifications. Wearing supportive shoes that have good arch support and a slightly raised heel reduces stress on the plantar fascia.  -Medications. Oral nonsteroidal anti-inflammatory drugs (NSAIDs), such as ibuprofen, may be recommended to reduce pain and inflammation.     If you still have pain after several weeks, see your foot and ankle surgeon, who may add one or more of these treatment approaches:    -Padding, taping and strapping. Placing pads in the shoe softens the impact of walking. Taping and strapping help support the foot and reduce strain on the fascia.  -Orthotic devices. Custom orthotic devices that fit into your shoe help correct the underlying structural abnormalities causing the plantar  fasciitis.  -Injection therapy. In some cases, corticosteroid injections are used to help reduce the inflammation and relieve pain.  -Removable walking cast. A removable walking cast may be used to keep your foot immobile for a few weeks to allow it to rest and heal.  -Night splint. Wearing a night splint allows you to maintain an extended stretch of the plantar fascia while sleeping. This may help reduce the morning pain experienced by some patients.  -Physical therapy. Exercises and other physical therapy measures may be used to help provide relief.     When Is Surgery Needed?  Although most patients with plantar fasciitis respond to nonsurgical treatment, a small percentage of patients may require surgery. If, after several months of nonsurgical treatment, you continue to have heel pain, surgery will be considered. Your foot and ankle surgeon will discuss the surgical options with you and determine which approach would be most beneficial for you.    Long-Term Care  No matter what kind of treatment you undergo for plantar fasciitis, the underlying causes that led to this condition may remain. Therefore, you will need to continue with preventive measures. Wearing supportive shoes, stretching and using custom orthotic devices are the mainstay of long-term treatment for plantar fasciitis.            Understanding Heel Pain  Your heel is the back part of your foot. A band of tissue called the plantar fascia connects the heel bone to the bones in the ball of your foot. Nerves run from the heel up the inside of your ankle and into your leg. When you feel pain in the bottom of your heel, the plantar fascia may be inflamed. Overuse, Achilles tightness, or excess body weight can cause the tissue to tear or pull away from the bone. Sometimes the inflamed plantar fascia also irritates a nerve, causing more pain.    What causes heel pain?  Wearing shoes with poor cushioning can irritate the tissue in your heel (plantar  fascia). Being overweight or standing for long periods can also irritate the tissue. Running, walking, tennis, and other sports that put stress on the heels can cause tiny tears in the tissue. If your lower leg muscles are tight, this is more likely to occur. A tight Achilles tendon will also contribute to heel pain.  Symptoms  You may feel pain on the bottom or on the inside edge of your heel. The pain may be sharp when you get out of bed or when you stand up after sitting for a while. You may feel a dull ache in your heel after youve been standing for a long time on a hard surface. Running can also cause a dull ache.  Preventing future problems  To prevent future heel pain, wear shoes with well-cushioned heels. And do exercises prescribed by your healthcare provider to stretch the plantar fascia and the muscles in the lower leg.   Date Last Reviewed: 9/10/2015  © 9447-4157 [employer]. [address] [place] [address]. All rights reserved. This information is not intended as a substitute for professional medical care. Always follow your healthcare professional's instructions.      Treating Plantar Fasciitis  First, your healthcare provider tries to determine the cause of your problem in order to suggest ways to relieve pain. If your pain is due to poor foot mechanics, custom-made shoe inserts (orthoses) may help.    Reduce symptoms  To relieve mild symptoms, try aspirin, ibuprofen, or other medicines as directed. Rubbing ice on the affected area may also help.  To reduce severe pain and swelling, your healthcare provider may prescribe pills or injections or a walking cast in some instances. Physical therapy, such as ultrasound or a daily stretching program, may also be recommended. Surgery is rarely required.  To reduce symptoms caused by poor foot mechanics, your foot may be taped. This supports the arch and temporarily controls movement. Night splints may also help by stretching the  fascia.  Control movement  If taping helps, your healthcare provider may prescribe orthoses. Built from plaster casts of your feet, these inserts control the way your foot moves. As a result, your symptoms should go away.  Reduce overuse  Every time your foot strikes the ground, the plantar fascia is stretched. You can reduce the strain on the plantar fascia and the possibility of overuse by following these suggestions:  Lose any excess weight.  Avoid running on hard or uneven ground.  Use orthoses at all times in your shoes and house slippers.  If surgery is needed  Your healthcare provider may consider surgery if other types of treatment don't control your pain. During surgery, the plantar fascia is partially cut to release tension. As you heal, fibrous tissue fills the space between the heel bone and the plantar fascia.   Date Last Reviewed: 10/14/2015  © 4673-1179 [employer]. [address] [place] [address]. All rights reserved. This information is not intended as a substitute for professional medical care. Always follow your healthcare professional's instructions.      Equinus          What Is Equinus?    Equinus is a condition in which the upward bending motion of the ankle joint is limited. Someone with equinus lacks the flexibility to bring the top of the foot toward the front of the leg. Equinus can occur in one or both feet. When it involves both feet, the limitation of motion is sometimes worse in one foot than in the other.    People with equinus develop ways to compensate for their limited ankle motion, and this often leads to other foot, leg or back problems. The most common methods of compensation are flattening of the arch or picking up the heel early when walking, placing increased pressure on the ball of the foot. Other patients compensate by toe walking, while a smaller number take steps by bending abnormally at the hip or knee.    Causes  There are several possible causes  for the limited range of ankle motion. Often, it is due to tightness in the Achilles tendon or calf muscles (the soleus muscle and/or gastrocnemius muscle). In some patients, this tightness is congenital (present at birth), and sometimes it is an inherited trait. Other patients acquire the tightness from being in a cast, being on crutches or frequently wearing high-heeled shoes. In addition, diabetes can affect the fibers of the Achilles tendon and cause tightness. Sometimes equinus is related to a bone blocking the ankle motion. For example, a fragment of a broken bone following an ankle injury, or bone block, can get in the way and restrict motion. Equinus may also result from one leg being shorter than the other. Less often, equinus is caused by spasms in the calf muscle. These spasms may be signs of an underlying neurologic disorder.      Foot Problems Related to Equinus  Depending on how a patient compensates for the inability to bend properly at the ankle, a variety of foot conditions can develop, including:    Plantar fasciitis (arch/heel pain)  Calf cramping  Tendonitis (inflammation in the Achilles tendon)  Metatarsalgia (pain and/or callusing on the ball of the foot)  Flatfoot  Arthritis of the midfoot (middle area of the foot)  Pressure sores on the ball of the foot or the arch  Bunions and hammertoes  Ankle pain  Shin splints     Diagnosis  Most patients with equinus are unaware they have this condition when they first visit the doctor. Instead, they come to the doctor seeking relief for foot problems associated with equinus.    To diagnose equinus, the foot and ankle surgeon will evaluate the ankle's range of motion when the knee is flexed (bent) as well as extended (straightened). This enables the surgeon to identify whether the tendon or muscle is tight and to assess whether bone is interfering with ankle motion. X-rays may also be ordered. In some cases, the foot and ankle surgeon may refer the  patient for neurologic evaluation.    Nonsurgical Treatment  Treatment includes strategies aimed at relieving the symptoms and conditions associated with equinus. In addition, the patient is treated for the equinus itself through one or more of the following options:    Night splint. The foot may be placed in a splint at night to keep it in a position that helps reduce tightness of the calf muscle.  Heel lifts. Placing heel lifts inside the shoes or wearing shoes with a moderate heel takes stress off the Achilles tendon when walking and may reduce symptoms.  Arch supports or orthotic devices. Custom orthotic devices that fit into the shoe are often prescribed to keep weight distributed properly and to help control muscle/tendon imbalance.  Physical therapy. To help remedy muscle tightness, exercises that stretch the calf muscle(s) are recommended.     When Is Surgery Needed?  In some cases, surgery may be needed to correct the cause of equinus if it is related to a tight tendon or a bone blocking the ankle motion. The foot and ankle surgeon will determine the type of procedure that is best suited to the individual patient.                Ankle Dorsiflexion/Plantarflexion (Flexibility)    Sit on the floor or in bed with your legs straight in front of you.  Point both feet. Then flex both feet.  Do this 10 to 30 times in a row.  Repeat this exercise 2 times a day, or as instructed.  Date Last Reviewed: 5/1/2016 © 2000-2016 [employer]. [address] [place] [address]. All rights reserved. This information is not intended as a substitute for professional medical care. Always follow your healthcare professional's instructions.          Arch retraining    These exercises are for your right foot. Switch sides for your left foot.  Sit in a chair or stand with both feet flat on the floor. Press down with the ball of your right foot, but only on the left side of the foot, just under the big  toe.  Then pull the bottom of your big toe back toward your heel. This should pull up the arch of your foot. Dont flex your toes while doing this. It is a subtle movement of the arch.  Hold for 5 seconds. Relax.  Date Last Reviewed: 3/10/2016  © 2393-3940 [employer]. [address] [place] [address]. All rights reserved. This information is not intended as a substitute for professional medical care. Always follow your healthcare professional's instructions.        Soleus Stretch (Flexibility)    Stand facing a wall from 3 feet away. Take one step toward the wall with your right foot.  Place both palms on the wall. Bend both knees and lean forward. Keep both heels on the floor.  Hold for 30 to 60 seconds. Then relax both legs. Repeat the exercise 2 times.  Switch legs and repeat.  Repeat this exercise 3 times a day, or as instructed.     Tip: Dont bounce while youre stretching.   Date Last Reviewed: 3/10/2016  © 1387-2539 [employer]. [address] [place] [address]. All rights reserved. This information is not intended as a substitute for professional medical care. Always follow your healthcare professional's instructions.          Recommended OTC orthotics:  -powerstep  -superfeet    Recommended shoegear:  -new balance  -ascics  -ralph dawn

## 2023-08-22 ENCOUNTER — PATIENT MESSAGE (OUTPATIENT)
Dept: ADMINISTRATIVE | Facility: OTHER | Age: 48
End: 2023-08-22
Payer: COMMERCIAL

## 2023-08-29 ENCOUNTER — OFFICE VISIT (OUTPATIENT)
Dept: PRIMARY CARE CLINIC | Facility: CLINIC | Age: 48
End: 2023-08-29
Payer: COMMERCIAL

## 2023-08-29 VITALS
WEIGHT: 149.69 LBS | DIASTOLIC BLOOD PRESSURE: 74 MMHG | SYSTOLIC BLOOD PRESSURE: 116 MMHG | HEART RATE: 77 BPM | BODY MASS INDEX: 24.91 KG/M2 | TEMPERATURE: 98 F | OXYGEN SATURATION: 98 %

## 2023-08-29 DIAGNOSIS — J06.9 UPPER RESPIRATORY TRACT INFECTION, UNSPECIFIED TYPE: Primary | ICD-10-CM

## 2023-08-29 PROCEDURE — 99499 UNLISTED E&M SERVICE: CPT | Mod: S$GLB,,, | Performed by: INTERNAL MEDICINE

## 2023-08-29 PROCEDURE — 99499 NO LOS: ICD-10-PCS | Mod: S$GLB,,, | Performed by: INTERNAL MEDICINE

## 2023-08-29 NOTE — PROGRESS NOTES
47-year-old woman with a history of premenopausal breast cancer (secondary neuropathy from chemo, status post radiation), history of migraine, history of anxiety, hyperlipidemia, here with a chief complaint of sinus symptoms.      History of present illness and pertinent review of systems:  The patient has a 2 day history of symptoms.  She had been at a retreat.  When she came home she did not notice any fever but did feel feverish and had chills.  She had no sweats.  She then noted a frontal headache different from her migraine.  She felt there was swelling beneath her eyes and to the side of her nose bilaterally.  She notes that her ears itch.  She notes rhinorrhea with green and occasional blood-streaked nasal discharge.  She has nasal congestion and a postnasal drip.  Next she has a sore throat but no swollen glands.  She has a nonproductive cough with a wheeze.  There is no pleurisy or hemoptysis.  She notes myalgia and fatigue.  There is no rash, diarrhea, or change in appetite.  Patient does note that her teeth hurt.  She tested negative for COVID yesterday.    Problem list, medication list, and allergies have been reviewed.    Remaining review of systems is otherwise negative except for the fact the patient wears glasses.    Physical Exam  Vitals reviewed.   Constitutional:       General: She is not in acute distress.     Appearance: Normal appearance. She is not ill-appearing.   HENT:      Head: Atraumatic.      Comments: There is minimal tenderness on percussion over the sinuses.  The maxillary and frontal sinuses transilluminate normally.     Right Ear: Tympanic membrane and ear canal normal.      Left Ear: Tympanic membrane and ear canal normal.      Nose: Congestion and rhinorrhea present.      Mouth/Throat:      Mouth: Mucous membranes are moist.      Pharynx: Oropharynx is clear. No oropharyngeal exudate or posterior oropharyngeal erythema.   Eyes:      General: No scleral icterus.      Conjunctiva/sclera: Conjunctivae normal.   Cardiovascular:      Rate and Rhythm: Normal rate and regular rhythm.      Pulses: Normal pulses.      Heart sounds: Normal heart sounds. No murmur heard.     No gallop.   Pulmonary:      Effort: Pulmonary effort is normal. No respiratory distress.      Breath sounds: Normal breath sounds. No wheezing or rhonchi.   Abdominal:      General: Bowel sounds are normal.      Palpations: Abdomen is soft. There is no mass.      Tenderness: There is no abdominal tenderness.      Comments: No hepatosplenomegaly   Musculoskeletal:         General: No swelling or tenderness.      Cervical back: Neck supple. No tenderness.      Right lower leg: No edema.      Left lower leg: No edema.   Lymphadenopathy:      Cervical: No cervical adenopathy.   Skin:     Coloration: Skin is not jaundiced.      Findings: No rash.   Neurological:      General: No focal deficit present.      Mental Status: She is alert.      Cranial Nerves: No cranial nerve deficit.      Deep Tendon Reflexes: Reflexes normal.   Psychiatric:         Mood and Affect: Mood normal.         Behavior: Behavior normal.    Assessment/plan:  URI :  Patient has a URI with sinus symptoms but does not have sinusitis.  Treatment at this point would be symptomatic only.    Plan:  Zyrtec 10 mg at bedtime   Saline nasal spray 4 squirts each nostril every 2-3 hours.    Sudafed 30 mg with breakfast and dinner.  She should get the Sudafed behind the counter which is the pseudoephedrine.  Throat lozenge of choice   Mucinex DM for cough.

## 2023-08-29 NOTE — PATIENT INSTRUCTIONS
Thank you for coming today.  Your symptoms are most compatible with an upper respiratory tract infection not sinusitis for the reasons I explained.    The following are my recommendations  Zyrtec 10 mg at bedtime.  Saline nasal spray 4 squirts each nostril every 2-3 hours  Sudafed 30 mg (the 1 behind the counter) 1 tablet with breakfast and dinner   Throat lozenge of choice   Mucinex DM for cough

## 2023-08-30 ENCOUNTER — OFFICE VISIT (OUTPATIENT)
Dept: PODIATRY | Facility: CLINIC | Age: 48
End: 2023-08-30
Payer: COMMERCIAL

## 2023-08-30 VITALS — BODY MASS INDEX: 24.46 KG/M2 | WEIGHT: 147 LBS

## 2023-08-30 DIAGNOSIS — M72.2 PLANTAR FASCIITIS OF RIGHT FOOT: Primary | ICD-10-CM

## 2023-08-30 DIAGNOSIS — M21.861 ACQUIRED POSTERIOR EQUINUS OF RIGHT LOWER EXTREMITY: ICD-10-CM

## 2023-08-30 PROCEDURE — 3008F BODY MASS INDEX DOCD: CPT | Mod: CPTII,S$GLB,, | Performed by: PODIATRIST

## 2023-08-30 PROCEDURE — 99213 OFFICE O/P EST LOW 20 MIN: CPT | Mod: 25,S$GLB,, | Performed by: PODIATRIST

## 2023-08-30 PROCEDURE — 99999 PR PBB SHADOW E&M-EST. PATIENT-LVL III: ICD-10-PCS | Mod: PBBFAC,,, | Performed by: PODIATRIST

## 2023-08-30 PROCEDURE — 1159F MED LIST DOCD IN RCRD: CPT | Mod: CPTII,S$GLB,, | Performed by: PODIATRIST

## 2023-08-30 PROCEDURE — 1160F RVW MEDS BY RX/DR IN RCRD: CPT | Mod: CPTII,S$GLB,, | Performed by: PODIATRIST

## 2023-08-30 PROCEDURE — 3008F PR BODY MASS INDEX (BMI) DOCUMENTED: ICD-10-PCS | Mod: CPTII,S$GLB,, | Performed by: PODIATRIST

## 2023-08-30 PROCEDURE — 99999 PR PBB SHADOW E&M-EST. PATIENT-LVL III: CPT | Mod: PBBFAC,,, | Performed by: PODIATRIST

## 2023-08-30 PROCEDURE — 20550 PR INJECT TENDON SHEATH/LIGAMENT: ICD-10-PCS | Mod: RT,S$GLB,, | Performed by: PODIATRIST

## 2023-08-30 PROCEDURE — 1160F PR REVIEW ALL MEDS BY PRESCRIBER/CLIN PHARMACIST DOCUMENTED: ICD-10-PCS | Mod: CPTII,S$GLB,, | Performed by: PODIATRIST

## 2023-08-30 PROCEDURE — 1159F PR MEDICATION LIST DOCUMENTED IN MEDICAL RECORD: ICD-10-PCS | Mod: CPTII,S$GLB,, | Performed by: PODIATRIST

## 2023-08-30 PROCEDURE — 99213 PR OFFICE/OUTPT VISIT, EST, LEVL III, 20-29 MIN: ICD-10-PCS | Mod: 25,S$GLB,, | Performed by: PODIATRIST

## 2023-08-30 PROCEDURE — 20550 NJX 1 TENDON SHEATH/LIGAMENT: CPT | Mod: RT,S$GLB,, | Performed by: PODIATRIST

## 2023-08-30 RX ORDER — TRIAMCINOLONE ACETONIDE 40 MG/ML
40 INJECTION, SUSPENSION INTRA-ARTICULAR; INTRAMUSCULAR
Status: DISCONTINUED | OUTPATIENT
Start: 2023-08-30 | End: 2023-08-30 | Stop reason: HOSPADM

## 2023-08-30 RX ADMIN — TRIAMCINOLONE ACETONIDE 40 MG: 40 INJECTION, SUSPENSION INTRA-ARTICULAR; INTRAMUSCULAR at 01:08

## 2023-08-30 NOTE — PROGRESS NOTES
[facility] PODIATRY  [address] [place] [address], [address]  [facility] [address]  Dept: [phone]  Dept Fax: [phone]    Partha Gracia Jr., DPM     Assessment:   MDM    Coding  1. Plantar fasciitis of right foot  Ambulatory referral/consult to Physical/Occupational Therapy    Tendon Sheath      2. Acquired posterior equinus of right lower extremity  Ambulatory referral/consult to Physical/Occupational Therapy          Plan:     Tendon Sheath    Date/Time: 8/30/2023 1:00 PM    Performed by: Partha Garcia Jr., DPM  Authorized by: Partha Garcia Jr., DPM    Consent Done?:  Yes (Verbal)  Indications:  Pain  Site marked: the procedure site was marked    Timeout: prior to procedure the correct patient, procedure, and site was verified    Prep: patient was prepped and draped in usual sterile fashion      Local anesthesia used?: Yes    Anesthesia:  Local infiltration  Local anesthetic:  Co-phenylcaine spray and bupivacaine 0.5% without epinephrine  Anesthetic total (ml):  2    Location:  Foot  Foot joint: R PF.  Ultrasonic guidance for needle placement?: No    Needle size:  25 G  Approach:  Medial  Medications:  40 mg triamcinolone acetonide 40 mg/mL  Patient tolerance:  Patient tolerated the procedure well with no immediate complications      Ksenia was seen today for heel pain.    Diagnoses and all orders for this visit:    Plantar fasciitis of right foot  -     Ambulatory referral/consult to Physical/Occupational Therapy; Future  -     Tendon Sheath    Acquired posterior equinus of right lower extremity  -     Ambulatory referral/consult to Physical/Occupational Therapy; Future        -pt seen, evaluated, and managed  -dx discussed in detail. All questions/concerns addressed  -all tx options discussed. All alternatives, risks, benefits of all txs discussed  -the patient was educated about the diagnosis  -We discussed conservative care options possible including but not limited to shoe wear  and/or padding, bracing/strapping, at home ROM, formal PT, medical therapy, injection therapy  - The utilization of NSAIDs can be considered but their benefit has to be tempered against the risk of GI/ concerns  - A steroid injection can be undertaken.  We did discuss the potential mechanism of action of this shot.  Understanding that multiple injections at the same anatomic site do have deleterious effects on the soft tissue.  Generic risks include: steroid flare (advised to ice if necessary), skin hypo-pgimentation (which can be permanent and unsightly), elevation of blood sugar, subcutaneous atrophy (can be permanent) and infection.   -XR reviewed  -labs reviewed by me: okf or rx nsaids  -implemented icing/stretching regimen  -heel lifts dispensed        -rxs dispensed: none  -referrals: PT  -WB: wbat      Follow up in about 8 weeks (around 10/25/2023).    Subjective:      Patient ID: Ksenia Arce is a 47 y.o. female.    Chief Complaint:   Chief Complaint   Patient presents with    Heel Pain     Patient presents today for a follow up for her right heel pain.       CC - foot pain: patient presents to the podiatry clinic  with complaint of  right foot pain. Onset of the symptoms was several months ago. Precipitating event: unk. Current symptoms include: ability to bear weight, but with some pain, kita the heel, swelling and worsening symptoms after a period of inactivity. Aggravating factors: walking and certain shoegear. Symptoms have gradually worsened. Patient has had no prior foot problems. Evaluation to date: none. Treatment to date: elastic supporter which is somewhat effective and OTC analgesics which are not very effective. Patients rates pain 7/10 on pain scale.      8/30/23:  Hx as above. Pain improved after dex inj last visit but not gone.        Last Podiatry Enc: Visit date not found  Last Enc w/ Me: Visit date not found    Outside reports reviewed: historical medical records.  Family hx: as  below  Past Medical History:   Diagnosis Date    Breast cancer 05/2016    ER+ ILC    History of antineoplastic chemotherapy 2016    6/30/2016-11/16/2016    History of therapeutic radiation 2017    left chest, completed 5/10/17, dose 9500 cGy    Mental disorder     Migraine headache     Neuropathy      Past Surgical History:   Procedure Laterality Date    BREAST CAPSULECTOMY      BREAST RECONSTRUCTION      COLONOSCOPY N/A 09/17/2021    Procedure: COLONOSCOPY;  Surgeon: Diony Montes MD;  Location: [facility];  Service: Endoscopy;  Laterality: N/A;    MASTECTOMY Left 12/26/2016    TONSILLECTOMY      TOTAL ABDOMINAL HYSTERECTOMY W/ BILATERAL SALPINGOOPHORECTOMY  2018     Family History   Problem Relation Age of Onset    Colon polyps Mother     Crohn's disease Mother     Clotting disorder Mother     Coronary artery disease Mother     Heart disease Father     Breast cancer Paternal Grandmother 72    Breast cancer Maternal Cousin 59        stage 0     Current Outpatient Medications   Medication Sig Dispense Refill    anastrozole (ARIMIDEX) 1 mg Tab Take 1 tablet (1 mg total) by mouth once daily. 90 tablet 6    atogepant (QULIPTA) 60 mg Tab Take 1 tablet (60 mg) by mouth once daily. 30 tablet 9    atorvastatin (LIPITOR) 40 MG tablet Take 1 tablet (40 mg total) by mouth once daily. 90 tablet 2    butalbitaL-acetaminop-caf-cod (FIORICET WITH CODEINE) -96-30 mg Cap Take 1 capsule by mouth every 6 hours as needed for migraine 12 capsule 0    FLUoxetine 40 MG capsule Take 1 capsule (40 mg total) by mouth every morning. 90 capsule 0    LORazepam (ATIVAN) 0.5 MG tablet Take 1 tablet (0.5 mg total) by mouth daily as needed for Anxiety. 30 tablet 0    naproxen sodium (ANAPROX) 550 MG tablet Take 1 tablet (550 mg total) by mouth every 8 (eight) hours. 30 tablet 2    ondansetron (ZOFRAN) 4 MG tablet Take 2 tablets (8 mg total) by mouth every 8 (eight) hours as needed for Nausea. 21 tablet 0    ubrogepant (UBRELVY) 100 mg  tablet Take 1 tablet (100 mg total) by mouth As instructed for Migraine. May repeat once , after 2 hours. No more than 2 in 24 hours 10 tablet 9    albuterol (PROVENTIL HFA) 90 mcg/actuation inhaler Inhale 2 puffs into the lungs every 6 (six) hours as needed for Wheezing. Rescue 18 g 0    gabapentin (NEURONTIN) 300 MG capsule Take 1 capsule (300 mg total) by mouth 3 (three) times daily. 270 capsule 1     No current facility-administered medications for this visit.     Review of patient's allergies indicates:  No Known Allergies  Social History     Socioeconomic History    Marital status:    Tobacco Use    Smoking status: Never     Passive exposure: Never    Smokeless tobacco: Never   Substance and Sexual Activity    Alcohol use: Yes    Drug use: Never    Sexual activity: Yes     Partners: Male     Social Determinants of Health     Stress: Stress Concern Present (1/23/2020)    [employer] - Occupational Stress Questionnaire     Feeling of Stress : To some extent       ROS    REVIEW OF SYSTEMS: Negative as documented below as well as positive findings in bold.       Constitutional  Respiratory  Gastrointestinal  Skin   - Fever - Cough - Heartburn - Rash   - Chills - Spit blood - Nausea - Itching   - Weight Loss - Shortness of breath - Vomiting - Nail pain   - Malaise/Fatigue - Wheezing - Abdominal Pain  Wound/Ulcer   - Weight Gain   - Blood in Stool  Poor wound healing       - Diarrhea          Cardiovascular  Genitourinary  Neurological  HEENT   - Chest Pain - Dysuria - Burning Sensation of feet - Headache   - Palpitations - Hematuria - Tingling / Paresthesia - Congestion   - Pain at night in legs - Flank Pain - Dizziness - Sore Throat   - Cramping   - Tremor - Blurred Vision   - Leg Swelling   - Sensory Change - Double Vision   - Dizzy when standing   - Speech Change - Eye Redness       - Focal Weakness - Dry Eyes       - Loss of Consciousness          Endocrine  Musculoskeletal   Psychiatric   - Cold intolerance - Muscle Pain - Depression   - Heat intolerance - Neck Pain - Insomnia   - Anemia - Joint Pain - Memory Loss   -  Easy bruising, bleeding - Heel pain - Anxiety      Toe Pain        Leg/Ankle/Foot Pain         Objective:     Wt 66.7 kg (147 lb)   LMP 11/27/2013 Comment: GOPAL  BMI 24.46 kg/m²   Vitals:    08/30/23 1305   Weight: 66.7 kg (147 lb)   PainSc:   4   PainLoc: Foot         Physical Exam    General Appearance:   Patient appears well developed, well nourished  Patient appears stated age    Psychiatric:   Patient is oriented to time, place, and person.  Patient has appropriate mood and affect    Neck:  Trachea Midline  No visible masses    Respiratory/Ears:  No distress or labored breathing.  Able to differentiate between normal talking voice and whisper.  Able to follow commands    Eyes:  Visual Acuity intact  Lids and conjunctivae normal. No discoloration noted.    Foot Exam  Physical Exam  Ortho Exam  Ortho/SPM Exam  Foot/Ankle Musculoskeletal Exam    R LE exam con't:  V:  DP 2/4, PT 2/4   CRT< 3s to all digits tested   Tibial and popliteal lymph nodes are w/o abnormality   Edema: absent, varicosities: absent    N:  Patient displays normal ankle reflexes   SILT in SP/DP/T/Mary/Saph distributions    Ortho: +Motor EHL/FHL/TA/GA   equinus deformity present  There is moderate pain with palpation of R medial calcaneal tubercle  Compartments soft/compressible. No pain on passive stretch of big toe. No calf  Pain.    Derm:  skin intact, skin warm and dry, skin without ulcers or lesions, skin without induration, nails normal, texture turgor well hydrated      Imaging / Labs:      X-Ray Foot Complete Right    Result Date: 7/17/2023  EXAMINATION: XR FOOT COMPLETE 3 VIEW RIGHT CLINICAL HISTORY: . Pain in right foot TECHNIQUE: AP, lateral, and oblique views of the foot were performed. COMPARISON: None FINDINGS: There is no evidence to suggest acute fracture or dislocation.  The joint  spaces appear to be relatively well maintained.  Small dorsal and plantar calcaneal enthesophytes are noted.     As above Electronically signed by: Jackson Velasquez DO Date:    07/17/2023 Time:    08:51        Note: This was dictated using a computer transcription program. Although proofread, it may contain computer transcription errors and phonetic errors. Other human proofreading errors may also exist. Corrections may be performed at a later time. Please contact us for any clarification if needed.    Partha Garcia DPM  [facility]

## 2023-09-05 ENCOUNTER — HOSPITAL ENCOUNTER (EMERGENCY)
Facility: HOSPITAL | Age: 48
Discharge: HOME | End: 2023-09-05
Attending: EMERGENCY MEDICINE
Payer: COMMERCIAL

## 2023-09-05 VITALS
HEIGHT: 65 IN | HEART RATE: 71 BPM | RESPIRATION RATE: 18 BRPM | TEMPERATURE: 98 F | BODY MASS INDEX: 24.99 KG/M2 | DIASTOLIC BLOOD PRESSURE: 87 MMHG | WEIGHT: 150 LBS | SYSTOLIC BLOOD PRESSURE: 136 MMHG | OXYGEN SATURATION: 99 %

## 2023-09-05 DIAGNOSIS — R07.9 CHEST PAIN: ICD-10-CM

## 2023-09-05 DIAGNOSIS — R07.89 RIGHT-SIDED CHEST WALL PAIN: ICD-10-CM

## 2023-09-05 DIAGNOSIS — R07.89 CHEST DISCOMFORT: ICD-10-CM

## 2023-09-05 DIAGNOSIS — E78.00 ELEVATED CHOLESTEROL: Primary | ICD-10-CM

## 2023-09-05 LAB
ALBUMIN SERPL BCP-MCNC: 4.4 G/DL (ref 3.5–5.2)
ALP SERPL-CCNC: 66 U/L (ref 55–135)
ALT SERPL W/O P-5'-P-CCNC: 44 U/L (ref 10–44)
ANION GAP SERPL CALC-SCNC: 11 MMOL/L (ref 8–16)
AST SERPL-CCNC: 23 U/L (ref 10–40)
BASOPHILS # BLD AUTO: 0.02 K/UL (ref 0–0.2)
BASOPHILS NFR BLD: 0.3 % (ref 0–1.9)
BILIRUB SERPL-MCNC: 0.7 MG/DL (ref 0.1–1)
BNP SERPL-MCNC: 18 PG/ML (ref 0–99)
BUN SERPL-MCNC: 13 MG/DL (ref 6–20)
CALCIUM SERPL-MCNC: 10.8 MG/DL (ref 8.7–10.5)
CHLORIDE SERPL-SCNC: 102 MMOL/L (ref 95–110)
CHOLEST SERPL-MCNC: 302 MG/DL (ref 120–199)
CHOLEST/HDLC SERPL: 3.8 {RATIO} (ref 2–5)
CO2 SERPL-SCNC: 25 MMOL/L (ref 23–29)
CREAT SERPL-MCNC: 0.7 MG/DL (ref 0.5–1.4)
DIFFERENTIAL METHOD: ABNORMAL
EOSINOPHIL # BLD AUTO: 0.1 K/UL (ref 0–0.5)
EOSINOPHIL NFR BLD: 0.8 % (ref 0–8)
ERYTHROCYTE [DISTWIDTH] IN BLOOD BY AUTOMATED COUNT: 12.4 % (ref 11.5–14.5)
EST. GFR  (NO RACE VARIABLE): >60 ML/MIN/1.73 M^2
GLUCOSE SERPL-MCNC: 96 MG/DL (ref 70–110)
HCT VFR BLD AUTO: 45.9 % (ref 37–48.5)
HCV AB SERPL QL IA: NORMAL
HDLC SERPL-MCNC: 80 MG/DL (ref 40–75)
HDLC SERPL: 26.5 % (ref 20–50)
HGB BLD-MCNC: 15.5 G/DL (ref 12–16)
HIV 1+2 AB+HIV1 P24 AG SERPL QL IA: NORMAL
IMM GRANULOCYTES # BLD AUTO: 0.02 K/UL (ref 0–0.04)
IMM GRANULOCYTES NFR BLD AUTO: 0.3 % (ref 0–0.5)
LDLC SERPL CALC-MCNC: 184.6 MG/DL (ref 63–159)
LIPASE SERPL-CCNC: 29 U/L (ref 4–60)
LYMPHOCYTES # BLD AUTO: 1.1 K/UL (ref 1–4.8)
LYMPHOCYTES NFR BLD: 17.5 % (ref 18–48)
MCH RBC QN AUTO: 29.4 PG (ref 27–31)
MCHC RBC AUTO-ENTMCNC: 33.8 G/DL (ref 32–36)
MCV RBC AUTO: 87 FL (ref 82–98)
MONOCYTES # BLD AUTO: 0.5 K/UL (ref 0.3–1)
MONOCYTES NFR BLD: 8.6 % (ref 4–15)
NEUTROPHILS # BLD AUTO: 4.4 K/UL (ref 1.8–7.7)
NEUTROPHILS NFR BLD: 72.5 % (ref 38–73)
NONHDLC SERPL-MCNC: 222 MG/DL
NRBC BLD-RTO: 0 /100 WBC
PLATELET # BLD AUTO: 314 K/UL (ref 150–450)
PMV BLD AUTO: 9.3 FL (ref 9.2–12.9)
POTASSIUM SERPL-SCNC: 4 MMOL/L (ref 3.5–5.1)
PROT SERPL-MCNC: 7.8 G/DL (ref 6–8.4)
RBC # BLD AUTO: 5.27 M/UL (ref 4–5.4)
SODIUM SERPL-SCNC: 138 MMOL/L (ref 136–145)
TRIGL SERPL-MCNC: 187 MG/DL (ref 30–150)
TROPONIN I SERPL DL<=0.01 NG/ML-MCNC: <0.006 NG/ML (ref 0–0.03)
TROPONIN I SERPL DL<=0.01 NG/ML-MCNC: <0.006 NG/ML (ref 0–0.03)
WBC # BLD AUTO: 6.05 K/UL (ref 3.9–12.7)

## 2023-09-05 PROCEDURE — 86803 HEPATITIS C AB TEST: CPT | Performed by: PHYSICIAN ASSISTANT

## 2023-09-05 PROCEDURE — 25000003 PHARM REV CODE 250: Performed by: EMERGENCY MEDICINE

## 2023-09-05 PROCEDURE — 84484 ASSAY OF TROPONIN QUANT: CPT | Mod: 91 | Performed by: EMERGENCY MEDICINE

## 2023-09-05 PROCEDURE — 85025 COMPLETE CBC W/AUTO DIFF WBC: CPT | Performed by: EMERGENCY MEDICINE

## 2023-09-05 PROCEDURE — 96375 TX/PRO/DX INJ NEW DRUG ADDON: CPT

## 2023-09-05 PROCEDURE — 83880 ASSAY OF NATRIURETIC PEPTIDE: CPT | Performed by: EMERGENCY MEDICINE

## 2023-09-05 PROCEDURE — 25000242 PHARM REV CODE 250 ALT 637 W/ HCPCS: Performed by: EMERGENCY MEDICINE

## 2023-09-05 PROCEDURE — 80053 COMPREHEN METABOLIC PANEL: CPT | Performed by: EMERGENCY MEDICINE

## 2023-09-05 PROCEDURE — 93010 ELECTROCARDIOGRAM REPORT: CPT | Mod: ,,, | Performed by: INTERNAL MEDICINE

## 2023-09-05 PROCEDURE — 83690 ASSAY OF LIPASE: CPT | Performed by: EMERGENCY MEDICINE

## 2023-09-05 PROCEDURE — 63600175 PHARM REV CODE 636 W HCPCS: Performed by: EMERGENCY MEDICINE

## 2023-09-05 PROCEDURE — 93005 ELECTROCARDIOGRAM TRACING: CPT

## 2023-09-05 PROCEDURE — 93010 PR ELECTROCARDIOGRAM REPORT: ICD-10-PCS | Mod: ,,, | Performed by: INTERNAL MEDICINE

## 2023-09-05 PROCEDURE — 87389 HIV-1 AG W/HIV-1&-2 AB AG IA: CPT | Performed by: PHYSICIAN ASSISTANT

## 2023-09-05 PROCEDURE — 80061 LIPID PANEL: CPT | Performed by: PHYSICIAN ASSISTANT

## 2023-09-05 PROCEDURE — 96374 THER/PROPH/DIAG INJ IV PUSH: CPT

## 2023-09-05 PROCEDURE — 99285 EMERGENCY DEPT VISIT HI MDM: CPT | Mod: 25

## 2023-09-05 RX ORDER — MAG HYDROX/ALUMINUM HYD/SIMETH 200-200-20
15 SUSPENSION, ORAL (FINAL DOSE FORM) ORAL
Status: COMPLETED | OUTPATIENT
Start: 2023-09-05 | End: 2023-09-05

## 2023-09-05 RX ORDER — FAMOTIDINE 10 MG/ML
20 INJECTION INTRAVENOUS
Status: COMPLETED | OUTPATIENT
Start: 2023-09-05 | End: 2023-09-05

## 2023-09-05 RX ORDER — NITROGLYCERIN 0.4 MG/1
0.4 TABLET SUBLINGUAL EVERY 5 MIN PRN
Status: DISCONTINUED | OUTPATIENT
Start: 2023-09-05 | End: 2023-09-05 | Stop reason: HOSPADM

## 2023-09-05 RX ORDER — KETOROLAC TROMETHAMINE 30 MG/ML
10 INJECTION, SOLUTION INTRAMUSCULAR; INTRAVENOUS
Status: COMPLETED | OUTPATIENT
Start: 2023-09-05 | End: 2023-09-05

## 2023-09-05 RX ORDER — ONDANSETRON 4 MG/1
4 TABLET, ORALLY DISINTEGRATING ORAL
Status: COMPLETED | OUTPATIENT
Start: 2023-09-05 | End: 2023-09-05

## 2023-09-05 RX ADMIN — KETOROLAC TROMETHAMINE 10 MG: 30 INJECTION, SOLUTION INTRAMUSCULAR; INTRAVENOUS at 04:09

## 2023-09-05 RX ADMIN — FAMOTIDINE 20 MG: 10 INJECTION, SOLUTION INTRAVENOUS at 04:09

## 2023-09-05 RX ADMIN — ONDANSETRON 4 MG: 4 TABLET, ORALLY DISINTEGRATING ORAL at 02:09

## 2023-09-05 RX ADMIN — NITROGLYCERIN 0.4 MG: 0.4 TABLET, ORALLY DISINTEGRATING SUBLINGUAL at 03:09

## 2023-09-05 RX ADMIN — ALUMINUM HYDROXIDE, MAGNESIUM HYDROXIDE, AND SIMETHICONE 15 ML: 200; 200; 20 SUSPENSION ORAL at 04:09

## 2023-09-05 NOTE — FIRST PROVIDER EVALUATION
Medical screening examination initiated.  I have conducted a focused provider triage encounter, findings are as follows:    Brief history of present illness:  crushing chest pain, started this AM, no cardiac history, no SOB, associated nausea, right arm pain and bilateral finger tingling    There were no vitals filed for this visit.    Pertinent physical exam:  nontoxic    Brief workup plan:  EKG, cardiac workup    Preliminary workup initiated; this workup will be continued and followed by the physician or advanced practice provider that is assigned to the patient when roomed.

## 2023-09-05 NOTE — ED NOTES
EKG in process   
Pt resting in bed with daughter at bedside, place on continuous cardiac monitoring, ambulate to bathroom by self   
tommy

## 2023-09-05 NOTE — ED PROVIDER NOTES
Encounter Date: 9/5/2023       History     Chief Complaint   Patient presents with    Chest Pain     Chest pain x 3 hours. Pt states she took tums with some relief. Denies cardiac history. Denies SOB. Endorses nausea.      Pt is a 48 yo F with PMH of breast cancer 7 years ago, migraine headaches, GERD who presents with chest pain that started several hours ago about 30 minutes into a car trip.  She states initially she thought it was indigestion and took a Tums.  She reports that provide a little bit of relief but when they stopped at a gas station the pain became more of a pressure into her right chest radiating into her right arm.  She noted tingling in her right hand and around her mouth.  She also had associated nausea. She took an aspirin and her neighbor who is a nurse took her blood pressure and it was elevated to about 147/107. Pt normally has normal blood pressure. She notes a father with an MI at age 49. Pt is a non-smoker.  She denies fever, chills, cough, shortness of breath, nasal congestion, sore throat, abdominal pain.  No new daily medications.    The history is provided by the patient.     Review of patient's allergies indicates:  No Known Allergies  Past Medical History:   Diagnosis Date    Breast cancer 05/2016    ER+ ILC    History of antineoplastic chemotherapy 2016    6/30/2016-11/16/2016    History of therapeutic radiation 2017    left chest, completed 5/10/17, dose 9500 cGy    Mental disorder     Migraine headache     Neuropathy      Past Surgical History:   Procedure Laterality Date    BREAST CAPSULECTOMY      BREAST RECONSTRUCTION      COLONOSCOPY N/A 09/17/2021    Procedure: COLONOSCOPY;  Surgeon: Diony Montes MD;  Location: [facility];  Service: Endoscopy;  Laterality: N/A;    MASTECTOMY Left 12/26/2016    TONSILLECTOMY      TOTAL ABDOMINAL HYSTERECTOMY W/ BILATERAL SALPINGOOPHORECTOMY  2018     Family History   Problem Relation Age of Onset    Colon polyps Mother     Crohn's disease  Mother     Clotting disorder Mother     Coronary artery disease Mother     Heart disease Father     Breast cancer Paternal Grandmother 72    Breast cancer Maternal Cousin 59        stage 0     Social History     Tobacco Use    Smoking status: Never     Passive exposure: Never    Smokeless tobacco: Never   Substance Use Topics    Alcohol use: Yes    Drug use: Never         Physical Exam     Initial Vitals [09/05/23 1251]   BP Pulse Resp Temp SpO2   (!) 178/126 83 18 98.2 °F (36.8 °C) 100 %      MAP       --         Physical Exam    Nursing note and vitals reviewed.  Constitutional: She appears well-developed and well-nourished. She is not diaphoretic. No distress.   HENT:   Head: Normocephalic and atraumatic.   Eyes: Conjunctivae and EOM are normal.   Neck: Neck supple.   Normal range of motion.  Cardiovascular:  Normal rate, regular rhythm, normal heart sounds and intact distal pulses.           Radial pulses 2+ bilaterally   Pulmonary/Chest: Breath sounds normal. No respiratory distress. She exhibits no tenderness.   Abdominal: Abdomen is soft. She exhibits no distension. There is no abdominal tenderness. There is no rebound and no guarding.   Musculoskeletal:         General: No edema. Normal range of motion.      Cervical back: Normal range of motion and neck supple.     Neurological: She is alert and oriented to person, place, and time. GCS score is 15. GCS eye subscore is 4. GCS verbal subscore is 5. GCS motor subscore is 6.   Skin: Skin is warm and dry.   Psychiatric: She has a normal mood and affect. Her behavior is normal. Judgment and thought content normal.         ED Course   Procedures  Labs Reviewed   CBC W/ AUTO DIFFERENTIAL - Abnormal; Notable for the following components:       Result Value    Lymph % 17.5 (*)     All other components within normal limits    Narrative:     Release to patient->Immediate    add on lipase per Ar Alva MD order# [id number] 09/05/2023   @ 14:54    COMPREHENSIVE  METABOLIC PANEL - Abnormal; Notable for the following components:    Calcium 10.8 (*)     All other components within normal limits    Narrative:     Release to patient->Immediate    add on lipase per Ar Alva MD order# [id number] 09/05/2023   @ 14:54    LIPID PANEL - Abnormal; Notable for the following components:    Cholesterol 302 (*)     Triglycerides 187 (*)     HDL 80 (*)     LDL Cholesterol 184.6 (*)     All other components within normal limits    Narrative:     Release to patient->Immediate    add on lipase per Ar Alva MD order# [id number] 09/05/2023   @ 14:54     add on lipid panel per Ar Alva MD order# [id number]   09/05/2023 @ 16:40     TROPONIN I    Narrative:     Release to patient->Immediate    add on lipase per Ar Alva MD order# [id number] 09/05/2023   @ 14:54    TROPONIN I   B-TYPE NATRIURETIC PEPTIDE    Narrative:     Release to patient->Immediate    add on lipase per Ar Alva MD order# [id number] 09/05/2023   @ 14:54    HIV 1 / 2 ANTIBODY    Narrative:     Release to patient->Immediate    add on lipase per Ar Alva MD order# [id number] 09/05/2023   @ 14:54    HEPATITIS C ANTIBODY    Narrative:     Release to patient->Immediate    add on lipase per Ar Alva MD order# [id number] 09/05/2023   @ 14:54    LIPASE   LIPASE    Narrative:     Release to patient->Immediate    add on lipase per Ar Alva MD order# [id number] 09/05/2023   @ 14:54    LIPID PANEL   POCT TROPONIN   POCT TROPONIN     EKG Readings: (Independently Interpreted)   Initial Reading: No STEMI.   EKG done at 12:56 p.m. normal sinus rhythm rate of 75 normal axis there is motion artifact in lead V5 no prior EKG for comparison       Imaging Results              X-Ray Chest PA And Lateral (Final result)  Result time 09/05/23 15:32:24      Final result by Vishal Ozuna MD (09/05/23 15:32:24)                   Impression:      See  above      Electronically signed by: Vishal Ozuna MD  Date:    09/05/2023  Time:    15:32               Narrative:    EXAMINATION:  XR CHEST PA AND LATERAL    CLINICAL HISTORY:  Other chest pain    TECHNIQUE:  PA and lateral views of the chest were performed.    COMPARISON:  None    FINDINGS:  Heart size normal.  The lungs are clear.  No pleural effusion.  Postsurgical changes noted in the left breast and axilla.                        Final result by Vishal Ozuna MD (09/05/23 15:32:24)                   Impression:      See above      Electronically signed by: Vishal Ozuna MD  Date:    09/05/2023  Time:    15:32               Narrative:    EXAMINATION:  XR CHEST PA AND LATERAL    CLINICAL HISTORY:  Other chest pain    TECHNIQUE:  PA and lateral views of the chest were performed.    COMPARISON:  None    FINDINGS:  Heart size normal.  The lungs are clear.  No pleural effusion.  Postsurgical changes noted in the left breast and axilla.                                    X-Rays:   Independently Interpreted Readings:   Chest X-Ray: Normal heart size.  No infiltrates.  No acute abnormalities. No pneumothorax, no pneumomediastinum     Medications   nitroGLYCERIN SL tablet 0.4 mg (0.4 mg Sublingual Given 9/5/23 1510)   ondansetron disintegrating tablet 4 mg (4 mg Oral Given 9/5/23 1436)   ketorolac injection 9.999 mg (9.999 mg Intravenous Given 9/5/23 1633)   famotidine (PF) injection 20 mg (20 mg Intravenous Given 9/5/23 1633)   aluminum-magnesium hydroxide-simethicone 200-200-20 mg/5 mL suspension 15 mL (15 mLs Oral Given 9/5/23 1633)     Medical Decision Making  Patient with atypical right-sided chest pain    DDX: ACS, PE, pneumonia, pneumothorax, pleurisy, costochondritis, malignancy, aortic dissection    No recent cardiac workup  She is PERC negative  She is no risk factors for aortic dissection and has a normal neuro and vascular exam  She currently asymptomatic in our ER will trial nitro for  improvement    Patient did not have improvement with nitro  She was given Toradol, Maalox, Pepcid  She had 2 negative troponins    EKG without ischemic changes and chest x-ray is clear  We discussed observation versus outpatient cardiology follow-up and patient is really would like to go home tonight and promises to return if she has any new or worsening symptoms.  She seems reliable to do so.  Urgent cardiology referral was placed  Discharged to home in stable condition, return to ED warnings given, follow up and patient care instructions given.        Problems Addressed:  Chest pain: acute illness or injury    Amount and/or Complexity of Data Reviewed  Labs: ordered.  Radiology: ordered.    Risk  OTC drugs.  Prescription drug management.      Additional MDM:   PERC Rule:   Age is greater than or equal to 50 = 0.0  Heart Rate is greater than or equal to 100 = 0.0  SaO2 on room air < 95% = 0.0  Unilateral leg swelling = 0.0  Hemoptysis = 0.0  Recent surgery or trauma = 0.0  Prior PE or DVT =  0.0  Hormone use = 0.00  PERC Score = 0      Heart Score:    History:          Slightly suspicious.  ECG:             Normal  Age:               45-65 years  Risk factors: 1-2 risk factors  Troponin:       Less than or equal to normal limit  Heart Score = 2                ED Course as of 09/05/23 1909   Tue Sep 05, 2023   1520 EKG done at 2:49 p.m. normal sinus rhythm rate of 65 low-voltage QRS no significant change compared to prior EKG [GK]   1530 Patient reports the nitro did provide maybe some mild relief but she still has significant pressure in her right chest [GK]      ED Course User Index  [GK] Karla Alva MD                    Clinical Impression:   Final diagnoses:  [R07.9] Chest pain  [R07.89] Right-sided chest wall pain  [R07.89] Chest discomfort  [E78.00] Elevated cholesterol (Primary)        ED Disposition Condition    Discharge Stable          ED Prescriptions    None       Follow-up Information        Follow up With Specialties Details Why Contact Info Additional Information    Lourdes Felipe MD Internal Medicine   [address] [facility] [address]  [phone]       [facility] - Cardiology - 3rd Fl Cardiology Call   [address]  [facility] [address] [phone] Cardiology Services Clinics - 3rd floor             Karla Alva MD  09/05/23 3429

## 2023-09-05 NOTE — DISCHARGE INSTRUCTIONS
I recommend close follow up with a cardiologist I have placed an urgent referral.  Call tomorrow to schedule an appointment.       Your blood pressure is elevated today. This could be secondary to pain or just being in the Emergency department. However, this needs to be rechecked in the next 1-2 weeks by your primary care doctor.    Seek immediate medical attention if you develop new or worsening symptoms or for any other concern.

## 2023-09-10 PROBLEM — R07.9 CHEST PAIN OF UNKNOWN ETIOLOGY: Status: ACTIVE | Noted: 2023-09-10

## 2023-09-11 ENCOUNTER — OFFICE VISIT (OUTPATIENT)
Dept: CARDIOLOGY | Facility: CLINIC | Age: 48
End: 2023-09-11
Payer: COMMERCIAL

## 2023-09-11 VITALS
WEIGHT: 151.25 LBS | HEART RATE: 81 BPM | OXYGEN SATURATION: 99 % | HEIGHT: 65 IN | SYSTOLIC BLOOD PRESSURE: 130 MMHG | BODY MASS INDEX: 25.2 KG/M2 | DIASTOLIC BLOOD PRESSURE: 88 MMHG

## 2023-09-11 DIAGNOSIS — C50.212 MALIGNANT NEOPLASM OF UPPER-INNER QUADRANT OF LEFT BREAST IN FEMALE, ESTROGEN RECEPTOR POSITIVE: ICD-10-CM

## 2023-09-11 DIAGNOSIS — R07.89 CHEST DISCOMFORT: ICD-10-CM

## 2023-09-11 DIAGNOSIS — E78.5 HYPERLIPIDEMIA, UNSPECIFIED HYPERLIPIDEMIA TYPE: Primary | ICD-10-CM

## 2023-09-11 DIAGNOSIS — R07.9 CHEST PAIN OF UNKNOWN ETIOLOGY: ICD-10-CM

## 2023-09-11 DIAGNOSIS — Z17.0 MALIGNANT NEOPLASM OF UPPER-INNER QUADRANT OF LEFT BREAST IN FEMALE, ESTROGEN RECEPTOR POSITIVE: ICD-10-CM

## 2023-09-11 DIAGNOSIS — Z13.6 ENCOUNTER FOR SCREENING FOR CARDIOVASCULAR DISORDERS: ICD-10-CM

## 2023-09-11 PROCEDURE — 99204 PR OFFICE/OUTPT VISIT, NEW, LEVL IV, 45-59 MIN: ICD-10-PCS | Mod: S$GLB,,, | Performed by: INTERNAL MEDICINE

## 2023-09-11 PROCEDURE — 99204 OFFICE O/P NEW MOD 45 MIN: CPT | Mod: S$GLB,,, | Performed by: INTERNAL MEDICINE

## 2023-09-11 PROCEDURE — 99999 PR PBB SHADOW E&M-EST. PATIENT-LVL V: ICD-10-PCS | Mod: PBBFAC,,, | Performed by: INTERNAL MEDICINE

## 2023-09-11 PROCEDURE — 3008F BODY MASS INDEX DOCD: CPT | Mod: CPTII,S$GLB,, | Performed by: INTERNAL MEDICINE

## 2023-09-11 PROCEDURE — 3079F PR MOST RECENT DIASTOLIC BLOOD PRESSURE 80-89 MM HG: ICD-10-PCS | Mod: CPTII,S$GLB,, | Performed by: INTERNAL MEDICINE

## 2023-09-11 PROCEDURE — 1159F MED LIST DOCD IN RCRD: CPT | Mod: CPTII,S$GLB,, | Performed by: INTERNAL MEDICINE

## 2023-09-11 PROCEDURE — 1160F RVW MEDS BY RX/DR IN RCRD: CPT | Mod: CPTII,S$GLB,, | Performed by: INTERNAL MEDICINE

## 2023-09-11 PROCEDURE — 1159F PR MEDICATION LIST DOCUMENTED IN MEDICAL RECORD: ICD-10-PCS | Mod: CPTII,S$GLB,, | Performed by: INTERNAL MEDICINE

## 2023-09-11 PROCEDURE — 3075F PR MOST RECENT SYSTOLIC BLOOD PRESS GE 130-139MM HG: ICD-10-PCS | Mod: CPTII,S$GLB,, | Performed by: INTERNAL MEDICINE

## 2023-09-11 PROCEDURE — 1160F PR REVIEW ALL MEDS BY PRESCRIBER/CLIN PHARMACIST DOCUMENTED: ICD-10-PCS | Mod: CPTII,S$GLB,, | Performed by: INTERNAL MEDICINE

## 2023-09-11 PROCEDURE — 99999 PR PBB SHADOW E&M-EST. PATIENT-LVL V: CPT | Mod: PBBFAC,,, | Performed by: INTERNAL MEDICINE

## 2023-09-11 PROCEDURE — 3079F DIAST BP 80-89 MM HG: CPT | Mod: CPTII,S$GLB,, | Performed by: INTERNAL MEDICINE

## 2023-09-11 PROCEDURE — 3075F SYST BP GE 130 - 139MM HG: CPT | Mod: CPTII,S$GLB,, | Performed by: INTERNAL MEDICINE

## 2023-09-11 PROCEDURE — 3008F PR BODY MASS INDEX (BMI) DOCUMENTED: ICD-10-PCS | Mod: CPTII,S$GLB,, | Performed by: INTERNAL MEDICINE

## 2023-09-11 NOTE — PROGRESS NOTES
Chart has been dictated using voice recognition software.  It is not been reviewed carefully for any transcriptional errors due to this technology.   Subjective:   Patient ID:  Ksenia Arce is a 47 y.o. female who presents for evaluation of Chest Pain      HPI:  Patient with history of breast cancer treated with antineoplastic chemotherapy therapeutic radiation and surgical excision presented to the ED on 05-September-2023 with chest discomfort and had no elevation in troponins, normal ECG, and normal chest x-ray.  Patient presents for evaluation of chest discomfort.  Her lipid profile shows a markedly elevated LDL cholesterol but she also has a markedly elevated HDL cholesterol.    Patient had chest tightness driving home from the beach a week ago.  Had been active without any chest discomfort.  Has been getting chest tightness in the morning and radiates down right arm lasting 3-4 hours per day lasting 20-30 min at a time.  Never occurs with exertion.  Not associated with dyspnea, diaphoresis, burning in her throat or sour taste in her mouth.  Patient denies any dyspnea at rest or on exertion, orthopnea, PND, or edema.  Has occasional fluttering in chest lasting about 5 min occurring every couple of weeks.  Not associated with lightheadedness or chest discomfort. No syncope.    One week ago when chest discomfort occurred, she went to ED.  Also had tingling in lips and right fingertips. Took a nitroglycerin in the ED with some relief but chest discomfort returned.    Cardiac risk factors: hyperlipidemia, positive family history    Past Medical History:   Diagnosis Date    Breast cancer 05/2016    ER+ ILC    History of antineoplastic chemotherapy 2016    6/30/2016-11/16/2016    History of therapeutic radiation 2017    left chest, completed 5/10/17, dose 9500 cGy    Mental disorder     Migraine headache     Neuropathy    Chemotherapy included adriamycin (?) and taxol    Past Surgical History:   Procedure Laterality  Date    BREAST CAPSULECTOMY      BREAST RECONSTRUCTION      COLONOSCOPY N/A 09/17/2021    Procedure: COLONOSCOPY;  Surgeon: Diony Montes MD;  Location: [facility];  Service: Endoscopy;  Laterality: N/A;    MASTECTOMY Left 12/26/2016    TONSILLECTOMY      TOTAL ABDOMINAL HYSTERECTOMY W/ BILATERAL SALPINGOOPHORECTOMY  2018       Social History     Tobacco Use    Smoking status: Never     Passive exposure: Never    Smokeless tobacco: Never   Substance Use Topics    Alcohol use: Yes    Drug use: Never       Outpatient Medications Prior to Visit   Medication Sig Dispense Refill    anastrozole (ARIMIDEX) 1 mg Tab Take 1 tablet (1 mg total) by mouth once daily. 90 tablet 6    atogepant (QULIPTA) 60 mg Tab Take 1 tablet (60 mg) by mouth once daily. 30 tablet 9    butalbitaL-acetaminop-caf-cod (FIORICET WITH CODEINE) -33-30 mg Cap Take 1 capsule by mouth every 6 hours as needed for migraine 12 capsule 0    FLUoxetine 40 MG capsule Take 1 capsule (40 mg total) by mouth every morning. 90 capsule 0    LORazepam (ATIVAN) 0.5 MG tablet Take 1 tablet (0.5 mg total) by mouth daily as needed for Anxiety. 30 tablet 0    naproxen sodium (ANAPROX) 550 MG tablet Take 1 tablet (550 mg total) by mouth every 8 (eight) hours. 30 tablet 2    ondansetron (ZOFRAN) 4 MG tablet Take 2 tablets (8 mg total) by mouth every 8 (eight) hours as needed for Nausea. 21 tablet 0    ubrogepant (UBRELVY) 100 mg tablet Take 1 tablet (100 mg total) by mouth As instructed for Migraine. May repeat once , after 2 hours. No more than 2 in 24 hours 10 tablet 9    albuterol (PROVENTIL HFA) 90 mcg/actuation inhaler Inhale 2 puffs into the lungs every 6 (six) hours as needed for Wheezing. Rescue 18 g 0    atorvastatin (LIPITOR) 40 MG tablet Take 1 tablet (40 mg total) by mouth once daily. (Patient not taking: Reported on 9/11/2023) 90 tablet 2    gabapentin (NEURONTIN) 300 MG capsule Take 1 capsule (300 mg total) by mouth 3 (three) times daily. 270 capsule 1  "    No facility-administered medications prior to visit.       Review of patient's allergies indicates:  No Known Allergies    Review of Systems   Constitutional: Negative for weight gain and weight loss.   HENT:  Negative for nosebleeds.    Respiratory:  Negative for hemoptysis.    Hematologic/Lymphatic: Negative for bleeding problem. Does not bruise/bleed easily.   Gastrointestinal:  Negative for hematemesis and hematochezia.   Genitourinary:  Negative for hematuria.   Neurological:  Negative for focal weakness and weakness.      Objective:   Physical Exam  Constitutional:       Appearance: She is well-developed.      Comments: /88   Pulse 81   Ht 5' 5" (1.651 m)   Wt 68.6 kg (151 lb 3.8 oz)   LMP 11/27/2013 Comment: GOPAL  SpO2 99%   BMI 25.17 kg/m²    Neck:      Thyroid: No thyromegaly.      Vascular: No carotid bruit or JVD.   Cardiovascular:      Rate and Rhythm: Normal rate and regular rhythm.      Pulses: Intact distal pulses.      Heart sounds: S1 normal and S2 normal. No murmur heard.     No friction rub. Gallop present. S4 sounds present.   Pulmonary:      Effort: Pulmonary effort is normal.      Breath sounds: Normal breath sounds. No wheezing or rales.   Chest:       Abdominal:      General: Bowel sounds are normal.      Palpations: Abdomen is soft.      Tenderness: There is no abdominal tenderness.   Musculoskeletal:      Cervical back: Neck supple.   Skin:     Nails: There is no clubbing.   Neurological:      Mental Status: She is alert and oriented to person, place, and time.     Hyperventilating the patient reproduced the symptoms of right chest tightness and lip tingling but to a very mild degree.    Lab Results   Component Value Date    WBC 6.05 09/05/2023    HGB 15.5 09/05/2023    HCT 45.9 09/05/2023    MCV 87 09/05/2023     09/05/2023       Lab Results   Component Value Date     09/05/2023    K 4.0 09/05/2023    BUN 13 09/05/2023    BUN 17 02/05/2020    CREATININE 0.7 " 09/05/2023    GLU 96 09/05/2023    CHOL 302 (H) 09/05/2023    HDL 80 (H) 09/05/2023    LDLCALC 184.6 (H) 09/05/2023    LDLCALC 175 (H) 02/05/2020    TRIG 187 (H) 09/05/2023    CHOLHDL 26.5 09/05/2023    HGB 15.5 09/05/2023    HCT 45.9 09/05/2023     09/05/2023     ASCVD+ 10 year cardiovascular risk =  1.4%    ECG (05-September-2023) showed normal sinus rhythm and was a normal ECG, although the precordial lead voltage with slightly decreased.     Chest x-ray (05-September-2023) showed normal lungs and heart  Assessment:     1. Hyperlipidemia, unspecified hyperlipidemia type    2. Chest pain of unknown etiology    3. Malignant neoplasm of upper-inner quadrant of left breast in female, estrogen receptor positive    4. Chest discomfort    5. Encounter for screening for cardiovascular disorders      The patient's symptoms of chest discomfort are most likely due to noncardiac causes.  They were able to be reproduced slightly with hyperventilation in the office.  Given the duration of the symptoms in the frequency of the symptoms and the fact the patient had no evidence of having a myocardial infarction, it is highly unlikely the symptoms are cardiac in origin.  However, given that the patient has high LDL (although she also has a high HDL) and that she had chemotherapy and radiation to her left chest, she should be further evaluated for the presence of coronary artery disease.  I believe the 1st step should be a CT calcium score.  If CT calcium score is 0 no further evaluation is needed.  If the CT calcium score is between 0 and 50, then intensive risk factor modification should be undertaken.  However, if the CT score is greater than 100, a stress test would be in order.  The patient's breast cancer is being chronically treated and followed by Oncology.    Unless there are intervening problems, patient should return for re-evaluation in 6 months.       Plan:     Ksenia was seen today for chest pain.    Diagnoses  and all orders for this visit:    Hyperlipidemia, unspecified hyperlipidemia type    Chest pain of unknown etiology    Malignant neoplasm of upper-inner quadrant of left breast in female, estrogen receptor positive    Chest discomfort  -     Ambulatory referral/consult to Cardiology    Encounter for screening for cardiovascular disorders  -     CT Calcium Scoring Cardiac; Future; Expected date: 09/11/2023          Gavino Quintana MD  Consultative Cardiology

## 2023-09-11 NOTE — Clinical Note
Thank you for allowing me to follow-up with Ksenia Arce for chest pain of unknown etiology. Please see my note for details of this encounter. If you have any questions, please contact me.  Thank you again for allowing to participate in the care of this patient.

## 2023-09-21 ENCOUNTER — HOSPITAL ENCOUNTER (OUTPATIENT)
Dept: RADIOLOGY | Facility: HOSPITAL | Age: 48
Discharge: HOME | End: 2023-09-21
Attending: INTERNAL MEDICINE
Payer: COMMERCIAL

## 2023-09-21 DIAGNOSIS — Z13.6 ENCOUNTER FOR SCREENING FOR CARDIOVASCULAR DISORDERS: ICD-10-CM

## 2023-09-21 PROCEDURE — 75571 CT HRT W/O DYE W/CA TEST: CPT | Mod: TC

## 2023-09-21 PROCEDURE — 75571 CT CALCIUM SCORING CARDIAC: ICD-10-PCS | Mod: 26,,, | Performed by: RADIOLOGY

## 2023-09-21 PROCEDURE — 75571 CT HRT W/O DYE W/CA TEST: CPT | Mod: 26,,, | Performed by: RADIOLOGY

## 2023-09-25 ENCOUNTER — OFFICE VISIT (OUTPATIENT)
Dept: PLASTIC SURGERY | Facility: CLINIC | Age: 48
End: 2023-09-25

## 2023-09-25 DIAGNOSIS — Z41.1 ENCOUNTER FOR COSMETIC PROCEDURE: Primary | ICD-10-CM

## 2023-09-25 PROCEDURE — 99499 UNLISTED E&M SERVICE: CPT | Mod: ,,, | Performed by: OTOLARYNGOLOGY

## 2023-09-25 PROCEDURE — 99499 NO LOS: ICD-10-PCS | Mod: ,,, | Performed by: OTOLARYNGOLOGY

## 2023-09-25 NOTE — PROGRESS NOTES
Patient who is here today for consult for cosmetic myomodulator treatment.   Discussed benefits and risks of botulinum toxin injections, including headache, weakness/paralysis of muscles, asymmetry, eyebrow/lid drooping, pain, bruising, swelling, infection, and rare risk of systemic botulism.  The patient was given the opportunity to ask any questions, and all questions were answered to the patient's satisfaction.  The patient verbalized understanding, elected to proceed, and signed a written informed consent.  Today I have injected 60 units of Dysport in her frontalis musculature and 30 units in each of her  musculatures for a total of 120 units.     Patient tolerated well with no complications. She was instructed not to rub or massage the treated areas and that she should avoid lying down, bending upside down, and strenuous exercise for the rest of the day.  Instructed to wait two weeks to assess response before presenting for a touch up injection, if needed.

## 2023-09-26 DIAGNOSIS — F41.1 GENERALIZED ANXIETY DISORDER: ICD-10-CM

## 2023-09-26 NOTE — TELEPHONE ENCOUNTER
No care due was identified.  [facility] Embedded Care Due Messages. Reference number: [id number].   9/26/2023 1:21:36 PM CDT

## 2023-09-27 RX ORDER — FLUOXETINE HYDROCHLORIDE 40 MG/1
40 CAPSULE ORAL EVERY MORNING
Qty: 90 CAPSULE | Refills: 0 | Status: SHIPPED | OUTPATIENT
Start: 2023-09-27 | End: 2023-12-01 | Stop reason: SDUPTHER

## 2023-10-05 ENCOUNTER — HOSPITAL ENCOUNTER (OUTPATIENT)
Dept: RADIOLOGY | Facility: HOSPITAL | Age: 48
Discharge: HOME | End: 2023-10-05
Attending: INTERNAL MEDICINE
Payer: COMMERCIAL

## 2023-10-05 DIAGNOSIS — C50.919 INVASIVE LOBULAR CARCINOMA OF BREAST IN FEMALE: ICD-10-CM

## 2023-10-05 PROCEDURE — A9577 INJ MULTIHANCE: HCPCS | Performed by: INTERNAL MEDICINE

## 2023-10-05 PROCEDURE — 77049 MRI BREAST C-+ W/CAD BI: CPT | Mod: 26,,, | Performed by: RADIOLOGY

## 2023-10-05 PROCEDURE — 77049 MRI BREAST W/WO CONTRAST, W/CAD, BILATERAL: ICD-10-PCS | Mod: 26,,, | Performed by: RADIOLOGY

## 2023-10-05 PROCEDURE — 25500020 PHARM REV CODE 255: Performed by: INTERNAL MEDICINE

## 2023-10-05 PROCEDURE — 77049 MRI BREAST C-+ W/CAD BI: CPT | Mod: TC

## 2023-10-05 RX ADMIN — GADOBENATE DIMEGLUMINE 15 ML: 529 INJECTION, SOLUTION INTRAVENOUS at 01:10

## 2023-10-06 ENCOUNTER — OFFICE VISIT (OUTPATIENT)
Dept: HEMATOLOGY/ONCOLOGY | Facility: CLINIC | Age: 48
End: 2023-10-06
Payer: COMMERCIAL

## 2023-10-06 VITALS
DIASTOLIC BLOOD PRESSURE: 78 MMHG | OXYGEN SATURATION: 100 % | SYSTOLIC BLOOD PRESSURE: 113 MMHG | HEART RATE: 72 BPM | TEMPERATURE: 99 F | BODY MASS INDEX: 24.74 KG/M2 | RESPIRATION RATE: 18 BRPM | HEIGHT: 65 IN | WEIGHT: 148.5 LBS

## 2023-10-06 DIAGNOSIS — C50.919 INVASIVE LOBULAR CARCINOMA OF BREAST IN FEMALE: Primary | ICD-10-CM

## 2023-10-06 DIAGNOSIS — C50.212 MALIGNANT NEOPLASM OF UPPER-INNER QUADRANT OF LEFT FEMALE BREAST, UNSPECIFIED ESTROGEN RECEPTOR STATUS: ICD-10-CM

## 2023-10-06 DIAGNOSIS — M85.89 OSTEOPENIA OF MULTIPLE SITES: ICD-10-CM

## 2023-10-06 DIAGNOSIS — Z79.811 USE OF ANASTROZOLE: ICD-10-CM

## 2023-10-06 DIAGNOSIS — C77.9 SECONDARY ADENOCARCINOMA OF LYMPH NODE: ICD-10-CM

## 2023-10-06 PROCEDURE — 99999 PR PBB SHADOW E&M-EST. PATIENT-LVL IV: CPT | Mod: PBBFAC,,, | Performed by: INTERNAL MEDICINE

## 2023-10-06 PROCEDURE — 3078F DIAST BP <80 MM HG: CPT | Mod: CPTII,S$GLB,, | Performed by: INTERNAL MEDICINE

## 2023-10-06 PROCEDURE — 99999 PR PBB SHADOW E&M-EST. PATIENT-LVL IV: ICD-10-PCS | Mod: PBBFAC,,, | Performed by: INTERNAL MEDICINE

## 2023-10-06 PROCEDURE — 3074F PR MOST RECENT SYSTOLIC BLOOD PRESSURE < 130 MM HG: ICD-10-PCS | Mod: CPTII,S$GLB,, | Performed by: INTERNAL MEDICINE

## 2023-10-06 PROCEDURE — 99215 PR OFFICE/OUTPT VISIT, EST, LEVL V, 40-54 MIN: ICD-10-PCS | Mod: S$GLB,,, | Performed by: INTERNAL MEDICINE

## 2023-10-06 PROCEDURE — 3074F SYST BP LT 130 MM HG: CPT | Mod: CPTII,S$GLB,, | Performed by: INTERNAL MEDICINE

## 2023-10-06 PROCEDURE — 3078F PR MOST RECENT DIASTOLIC BLOOD PRESSURE < 80 MM HG: ICD-10-PCS | Mod: CPTII,S$GLB,, | Performed by: INTERNAL MEDICINE

## 2023-10-06 PROCEDURE — 1159F PR MEDICATION LIST DOCUMENTED IN MEDICAL RECORD: ICD-10-PCS | Mod: CPTII,S$GLB,, | Performed by: INTERNAL MEDICINE

## 2023-10-06 PROCEDURE — 1159F MED LIST DOCD IN RCRD: CPT | Mod: CPTII,S$GLB,, | Performed by: INTERNAL MEDICINE

## 2023-10-06 PROCEDURE — 3008F PR BODY MASS INDEX (BMI) DOCUMENTED: ICD-10-PCS | Mod: CPTII,S$GLB,, | Performed by: INTERNAL MEDICINE

## 2023-10-06 PROCEDURE — 3008F BODY MASS INDEX DOCD: CPT | Mod: CPTII,S$GLB,, | Performed by: INTERNAL MEDICINE

## 2023-10-06 PROCEDURE — 99215 OFFICE O/P EST HI 40 MIN: CPT | Mod: S$GLB,,, | Performed by: INTERNAL MEDICINE

## 2023-10-06 RX ORDER — ANASTROZOLE 1 MG/1
1 TABLET ORAL DAILY
Qty: 90 TABLET | Refills: 6 | Status: SHIPPED | OUTPATIENT
Start: 2023-10-06

## 2023-10-06 NOTE — PROGRESS NOTES
PROGRESS NOTE    Subjective:       Patient ID: Ksenia Arce is a 47 y.o. female.  MRN: [id number]  : 1975    Chief Complaint: Left Breast cancer     History of Present Illness:   Ksenia Arce is a 47 y.o. female who presents with  breast cancer. See Oncology history for details.      She has been on Anastrozole since the end of .  Diagnosed in 2016, s/p neoadjuvant chemotherapy.   She had a left mastectomy with reconstruction with flap reconstruction, then underwent RT.    Interim history:  She presents to discuss recent imaging.   MRI was done, results are pending.   Gabapentin is helping with neuropathy as needed.   She was taken off the statin for joint pains, felt better but then has had hypercholesterolemia, will see Dr. Felipe to discuss other treatments.     Went to the ER for chest pain, work up negative, symptoms have resolved.     Oncology History:     As per outside notes, Dr. Armstrong  [facility] upper inner quadrant of left breast [id number] stage IIIA, ER positive, NV positive, HER2 negative, dx in   Neoadjuvant chemotherapy ddAC followed by weekly taxol, final path showed residual disease in breast and  LN  Treated with adjuvant radiation  Initially treated with Lupron and Arimidex, started end of .  After GOPAL/BSO, Lupron was discontinued approximately 2018  This spring is 5 years of an AI. Discussed extending therapy. Considering her pathology, I think this is reasonable with follow up of bone density     FH: Paternal GM had breast cancer in her 70's      Genetics reportedly negative in     Oncology History   Malignant neoplasm of left breast   2016 Initial Diagnosis    Malignant neoplasm of left breast     3/18/2022 Cancer Staged    Staging form: Breast, AJCC 8th Edition  - Clinical: cT2, cN2, cM0, ER+, NV+, HER2-         History:  Past Medical History:   Diagnosis Date    Breast cancer 2016    ER+ [facility]    History of  antineoplastic chemotherapy 2016    6/30/2016-11/16/2016    History of therapeutic radiation 2017    left chest, completed 5/10/17, dose 9500 cGy    Mental disorder     Migraine headache     Neuropathy       Past Surgical History:   Procedure Laterality Date    BREAST CAPSULECTOMY      BREAST RECONSTRUCTION      COLONOSCOPY N/A 09/17/2021    Procedure: COLONOSCOPY;  Surgeon: Diony Montes MD;  Location: [facility];  Service: Endoscopy;  Laterality: N/A;    MASTECTOMY Left 12/26/2016    TONSILLECTOMY      TOTAL ABDOMINAL HYSTERECTOMY W/ BILATERAL SALPINGOOPHORECTOMY  2018     Family History   Problem Relation Age of Onset    Colon polyps Mother     Crohn's disease Mother     Clotting disorder Mother     Coronary artery disease Mother     Heart disease Father     Breast cancer Paternal Grandmother 72    Breast cancer Maternal Cousin 59        stage 0     Social History     Tobacco Use    Smoking status: Never     Passive exposure: Never    Smokeless tobacco: Never   Substance and Sexual Activity    Alcohol use: Yes    Drug use: Never    Sexual activity: Yes     Partners: Male        ROS:   Review of Systems   Constitutional:  Negative for fever, malaise/fatigue and weight loss.   HENT:  Negative for congestion, hearing loss, nosebleeds and sore throat.    Eyes:  Negative for double vision and photophobia.   Respiratory:  Negative for cough, hemoptysis, sputum production, shortness of breath and wheezing.    Cardiovascular:  Negative for chest pain, palpitations, orthopnea and leg swelling.   Gastrointestinal:  Negative for abdominal pain, blood in stool, constipation, diarrhea, heartburn, nausea and vomiting.   Genitourinary:  Negative for dysuria, hematuria and urgency.   Musculoskeletal:  Positive for back pain. Negative for joint pain and myalgias.   Skin:  Negative for itching and rash.   Neurological:  Positive for sensory change. Negative for dizziness, tingling, seizures, weakness and headaches.  "  Endo/Heme/Allergies:  Negative for polydipsia. Does not bruise/bleed easily.   Psychiatric/Behavioral:  Negative for depression and memory loss. The patient is nervous/anxious. The patient does not have insomnia.         Objective:     Vitals:    10/06/23 1325   BP: 113/78   Pulse: 72   Resp: 18   Temp: 98.5 °F (36.9 °C)   TempSrc: Oral   SpO2: 100%   Weight: 67.4 kg (148 lb 7.7 oz)   Height: 5' 5" (1.651 m)   PainSc: 0-No pain         Wt Readings from Last 10 Encounters:   10/06/23 67.4 kg (148 lb 7.7 oz)   09/11/23 68.6 kg (151 lb 3.8 oz)   09/05/23 68 kg (150 lb)   08/30/23 66.7 kg (147 lb)   08/29/23 67.9 kg (149 lb 11.1 oz)   07/18/23 67.9 kg (149 lb 11.1 oz)   05/18/23 68.3 kg (150 lb 9.2 oz)   05/09/23 67 kg (147 lb 11.3 oz)   05/05/23 68.1 kg (150 lb 0.4 oz)   03/21/23 67.1 kg (148 lb)       Physical Examination:   Physical Exam  Vitals and nursing note reviewed.   Constitutional:       General: She is not in acute distress.     Appearance: She is not diaphoretic.   HENT:      Head: Normocephalic.      Mouth/Throat:      Pharynx: No oropharyngeal exudate.   Eyes:      General: No scleral icterus.     Conjunctiva/sclera: Conjunctivae normal.   Neck:      Thyroid: No thyromegaly.   Cardiovascular:      Rate and Rhythm: Normal rate and regular rhythm.      Heart sounds: Normal heart sounds. No murmur heard.  Pulmonary:      Effort: Pulmonary effort is normal. No respiratory distress.      Breath sounds: No stridor. No wheezing or rales.   Chest:      Chest wall: No tenderness.   Abdominal:      General: Bowel sounds are normal. There is no distension.      Palpations: Abdomen is soft. There is no mass.      Tenderness: There is no abdominal tenderness. There is no rebound.   Musculoskeletal:         General: No tenderness or deformity. Normal range of motion.      Cervical back: Neck supple.   Lymphadenopathy:      Cervical: No cervical adenopathy.   Skin:     General: Skin is warm and dry.      Findings: No " erythema or rash.      Comments: Left mastectomy with flap reconstruction. No breast mass or axillary adenopathy bilaterally    Neurological:      Mental Status: She is alert and oriented to person, place, and time.      Cranial Nerves: No cranial nerve deficit.      Coordination: Coordination normal.      Gait: Gait is intact.   Psychiatric:         Mood and Affect: Affect normal.         Cognition and Memory: Memory normal.         Judgment: Judgment normal.          Diagnostic Tests:  Significant Imaging: I have reviewed and interpreted all pertinent imaging results/findings.    Laboratory Data:  All pertinent labs have been reviewed.  Labs:   Lab Results   Component Value Date    WBC 6.05 09/05/2023    RBC 5.27 09/05/2023    HGB 15.5 09/05/2023    HCT 45.9 09/05/2023    MCV 87 09/05/2023     09/05/2023    GLU 96 09/05/2023     09/05/2023    K 4.0 09/05/2023    BUN 13 09/05/2023    CREATININE 0.7 09/05/2023    AST 23 09/05/2023    ALT 44 09/05/2023    BILITOT 0.7 09/05/2023       Assessment/Plan:   Malignant neoplasm of upper-inner quadrant of left female breast, unspecified estrogen receptor status  Secondary adenocarcinoma of lymph node  Use of anastrozole    ILC upper inner quadrant of left breast [id number] stage IIIA, ER positive, IL positive, HER2 negative, dx in 2016     Continue anastrozole, recommend 10 years of treatment as tolerated given node positive disease. Continue supportive care as discussed below.     Continue active surveillance. Mammogram normal.   Alternated every 6 months MRI and mammogram.Follow results today, will call in case of any abnormal findings.     AI related Arthralgias   Improved after stopping statin. Monitor.     Osteopenia of multiple sites  Continue Prolia q 6 months. Due at this time, will schedule.   Next Dexa scan is due in March 2024.     Peripheral neuropathy due to chemotherapy  Continue gabapentin.     Anxiety   On Fluoxetine and lorazepam. Did not see onc-  psych.        ECOG SCORE    0 - Fully active-able to carry on all pre-disease performance without restriction           Discussion:   No follow-ups on file.    Plan was discussed with the patient at length, and she verbalized understanding. Ksenia was given an opportunity to ask questions that were answered to her satisfaction, and she was advised to call in the interval if any problems or questions arise.    Electronically signed by Kandy Coles MD    Route Chart for Scheduling    Med Onc Chart Routing  Urgent    Follow up with physician 6 months. after mammo   Follow up with ANNE    Infusion scheduling note   11/17 pls schedule Prolia   Injection scheduling note    Labs   Scheduling:  Preferred lab:  Lab interval:  BMP on 11/17   Imaging Mammogram   right mammogram 5/2/23   Pharmacy appointment    Other referrals                    Therapy Plan Information  Medications  denosumab (PROLIA) injection 60 mg  60 mg, Subcutaneous, Every 26 weeks          MDM includes  :    - Acute or chronic illness or injury that poses a threat to life or bodily function  - Independent review and explanation of 3+ results from unique tests  - Discussion of management and ordering 3+ unique tests  - Extensive discussion of treatment and management  - Prescription drug management  - Drug therapy requiring intensive monitoring for toxicity

## 2023-10-11 PROBLEM — M72.2 PLANTAR FASCIITIS OF RIGHT FOOT: Status: ACTIVE | Noted: 2023-10-11

## 2023-10-11 PROBLEM — M21.861 ACQUIRED POSTERIOR EQUINUS OF RIGHT LOWER EXTREMITY: Status: ACTIVE | Noted: 2023-10-11

## 2023-10-25 ENCOUNTER — PATIENT MESSAGE (OUTPATIENT)
Dept: CARDIOLOGY | Facility: CLINIC | Age: 48
End: 2023-10-25
Payer: COMMERCIAL

## 2023-11-02 RX ORDER — BUTALBITAL, ACETAMINOPHEN, CAFFEINE AND CODEINE PHOSPHATE 300; 50; 40; 30 MG/1; MG/1; MG/1; MG/1
CAPSULE ORAL
Qty: 12 CAPSULE | Refills: 0 | Status: SHIPPED | OUTPATIENT
Start: 2023-11-02

## 2023-11-17 ENCOUNTER — INFUSION (OUTPATIENT)
Dept: INFUSION THERAPY | Facility: HOSPITAL | Age: 48
End: 2023-11-17
Payer: COMMERCIAL

## 2023-11-17 DIAGNOSIS — M85.89 OSTEOPENIA OF MULTIPLE SITES: Primary | ICD-10-CM

## 2023-11-17 PROCEDURE — 96372 THER/PROPH/DIAG INJ SC/IM: CPT

## 2023-11-17 PROCEDURE — 63600175 PHARM REV CODE 636 W HCPCS: Mod: JZ,JG | Performed by: INTERNAL MEDICINE

## 2023-11-17 RX ADMIN — DENOSUMAB 60 MG: 60 INJECTION SUBCUTANEOUS at 12:11

## 2023-11-20 ENCOUNTER — OFFICE VISIT (OUTPATIENT)
Dept: OTOLARYNGOLOGY | Facility: CLINIC | Age: 48
End: 2023-11-20
Payer: COMMERCIAL

## 2023-11-20 VITALS
SYSTOLIC BLOOD PRESSURE: 155 MMHG | HEART RATE: 78 BPM | BODY MASS INDEX: 25.16 KG/M2 | DIASTOLIC BLOOD PRESSURE: 99 MMHG | WEIGHT: 151.19 LBS

## 2023-11-20 DIAGNOSIS — J01.80 OTHER ACUTE SINUSITIS, RECURRENCE NOT SPECIFIED: Primary | ICD-10-CM

## 2023-11-20 PROCEDURE — 3080F DIAST BP >= 90 MM HG: CPT | Mod: CPTII,S$GLB,, | Performed by: OTOLARYNGOLOGY

## 2023-11-20 PROCEDURE — 3077F PR MOST RECENT SYSTOLIC BLOOD PRESSURE >= 140 MM HG: ICD-10-PCS | Mod: CPTII,S$GLB,, | Performed by: OTOLARYNGOLOGY

## 2023-11-20 PROCEDURE — 99213 PR OFFICE/OUTPT VISIT, EST, LEVL III, 20-29 MIN: ICD-10-PCS | Mod: 25,S$GLB,, | Performed by: OTOLARYNGOLOGY

## 2023-11-20 PROCEDURE — 96372 THER/PROPH/DIAG INJ SC/IM: CPT | Mod: S$GLB,,, | Performed by: OTOLARYNGOLOGY

## 2023-11-20 PROCEDURE — 99999 PR PBB SHADOW E&M-EST. PATIENT-LVL III: ICD-10-PCS | Mod: PBBFAC,,, | Performed by: OTOLARYNGOLOGY

## 2023-11-20 PROCEDURE — 96372 PR INJECTION,THERAP/PROPH/DIAG2ST, IM OR SUBCUT: ICD-10-PCS | Mod: S$GLB,,, | Performed by: OTOLARYNGOLOGY

## 2023-11-20 PROCEDURE — 1159F PR MEDICATION LIST DOCUMENTED IN MEDICAL RECORD: ICD-10-PCS | Mod: CPTII,S$GLB,, | Performed by: OTOLARYNGOLOGY

## 2023-11-20 PROCEDURE — 1159F MED LIST DOCD IN RCRD: CPT | Mod: CPTII,S$GLB,, | Performed by: OTOLARYNGOLOGY

## 2023-11-20 PROCEDURE — 1160F RVW MEDS BY RX/DR IN RCRD: CPT | Mod: CPTII,S$GLB,, | Performed by: OTOLARYNGOLOGY

## 2023-11-20 PROCEDURE — 3077F SYST BP >= 140 MM HG: CPT | Mod: CPTII,S$GLB,, | Performed by: OTOLARYNGOLOGY

## 2023-11-20 PROCEDURE — 3044F HG A1C LEVEL LT 7.0%: CPT | Mod: CPTII,S$GLB,, | Performed by: OTOLARYNGOLOGY

## 2023-11-20 PROCEDURE — 3080F PR MOST RECENT DIASTOLIC BLOOD PRESSURE >= 90 MM HG: ICD-10-PCS | Mod: CPTII,S$GLB,, | Performed by: OTOLARYNGOLOGY

## 2023-11-20 PROCEDURE — 3008F PR BODY MASS INDEX (BMI) DOCUMENTED: ICD-10-PCS | Mod: CPTII,S$GLB,, | Performed by: OTOLARYNGOLOGY

## 2023-11-20 PROCEDURE — 1160F PR REVIEW ALL MEDS BY PRESCRIBER/CLIN PHARMACIST DOCUMENTED: ICD-10-PCS | Mod: CPTII,S$GLB,, | Performed by: OTOLARYNGOLOGY

## 2023-11-20 PROCEDURE — 99999 PR PBB SHADOW E&M-EST. PATIENT-LVL III: CPT | Mod: PBBFAC,,, | Performed by: OTOLARYNGOLOGY

## 2023-11-20 PROCEDURE — 99213 OFFICE O/P EST LOW 20 MIN: CPT | Mod: 25,S$GLB,, | Performed by: OTOLARYNGOLOGY

## 2023-11-20 PROCEDURE — 3044F PR MOST RECENT HEMOGLOBIN A1C LEVEL <7.0%: ICD-10-PCS | Mod: CPTII,S$GLB,, | Performed by: OTOLARYNGOLOGY

## 2023-11-20 PROCEDURE — 3008F BODY MASS INDEX DOCD: CPT | Mod: CPTII,S$GLB,, | Performed by: OTOLARYNGOLOGY

## 2023-11-20 RX ORDER — BROMPHENIRAMINE MALEATE, PSEUDOEPHEDRINE HYDROCHLORIDE, AND DEXTROMETHORPHAN HYDROBROMIDE 2; 30; 10 MG/5ML; MG/5ML; MG/5ML
5 SYRUP ORAL EVERY 8 HOURS PRN
Qty: 118 ML | Refills: 0 | Status: SHIPPED | OUTPATIENT
Start: 2023-11-20 | End: 2023-12-01

## 2023-11-20 RX ORDER — METHYLPREDNISOLONE ACETATE 40 MG/ML
40 INJECTION, SUSPENSION INTRA-ARTICULAR; INTRALESIONAL; INTRAMUSCULAR; SOFT TISSUE
Status: COMPLETED | OUTPATIENT
Start: 2023-11-20 | End: 2023-11-20

## 2023-11-20 RX ORDER — AZITHROMYCIN 250 MG/1
TABLET, FILM COATED ORAL
Qty: 6 TABLET | Refills: 0 | Status: SHIPPED | OUTPATIENT
Start: 2023-11-20 | End: 2023-12-01

## 2023-11-20 RX ADMIN — METHYLPREDNISOLONE ACETATE 40 MG: 40 INJECTION, SUSPENSION INTRA-ARTICULAR; INTRALESIONAL; INTRAMUSCULAR; SOFT TISSUE at 12:11

## 2023-11-20 NOTE — PROGRESS NOTES
Chief Complaint   Patient presents with    Nasal Congestion    Cough    Hoarse    Sinus Problem         48 y.o. female presents with recent increased nasal congestion, post nasal drainage and throat pain. She has had associated cough and feels that her voice is becoming hoarse. Symptoms started about 7 days ago and are worsening. Not currently using nasal sprays. She reports subjective fever. No previous nasal or sinus surgery. Has a history of migraines. She is using Theraflu and Mucinex for her symptoms without relief. She feels that her symptoms are worsening.       Review of Systems     Constitutional: Negative for fatigue and unexpected weight change.   HENT: per HPI.  Eyes: Negative for visual disturbance.   Respiratory: Negative for shortness of breath, hemoptysis  Cardiovascular: Negative for chest pain and palpitations.   Genitourinary: Negative for dysuria and difficulty urinating.   Musculoskeletal: Negative for decreased ROM, back pain.   Skin: Negative for rash, sunburn, itching.   Neurological: Negative for dizziness and seizures.   Hematological: Negative for adenopathy. Does not bruise/bleed easily.   Psychiatric/Behavioral: Negative for agitation. The patient is not nervous/anxious.   Endocrine: Negative for rapid weight loss/weight gain, heat/cold intolerance.     Past Medical History:   Diagnosis Date    Breast cancer 05/2016    ER+ ILC    History of antineoplastic chemotherapy 2016    6/30/2016-11/16/2016    History of therapeutic radiation 2017    left chest, completed 5/10/17, dose 9500 cGy    Mental disorder     Migraine headache     Neuropathy        Past Surgical History:   Procedure Laterality Date    BREAST CAPSULECTOMY      BREAST RECONSTRUCTION      COLONOSCOPY N/A 09/17/2021    Procedure: COLONOSCOPY;  Surgeon: Diony Montes MD;  Location: [facility];  Service: Endoscopy;  Laterality: N/A;    MASTECTOMY Left 12/26/2016    TONSILLECTOMY      TOTAL ABDOMINAL HYSTERECTOMY W/ BILATERAL  SALPINGOOPHORECTOMY  2018       family history includes Breast cancer (age of onset: 59) in her maternal cousin; Breast cancer (age of onset: 72) in her paternal grandmother; Clotting disorder in her mother; Colon polyps in her mother; Coronary artery disease in her mother; Crohn's disease in her mother; Heart disease in her father.    Pt  reports that she has never smoked. She has never been exposed to tobacco smoke. She has never used smokeless tobacco. She reports current alcohol use. She reports that she does not use drugs.    Review of patient's allergies indicates:  No Known Allergies     Physical Exam    Vitals:    11/20/23 1107   BP: (!) 155/99   Pulse: 78     Body mass index is 25.16 kg/m².    Physical Exam  Vitals and nursing note reviewed.   Constitutional:       General: She is not in acute distress.     Appearance: She is well-developed. She is not diaphoretic.   HENT:      Head: Normocephalic and atraumatic.      Right Ear: Hearing, tympanic membrane, ear canal and external ear normal. No decreased hearing noted. No middle ear effusion. Tympanic membrane has normal mobility.      Left Ear: Hearing, tympanic membrane, ear canal and external ear normal. No decreased hearing noted.  No middle ear effusion. Tympanic membrane has normal mobility.      Nose: Mucosal edema and rhinorrhea present. Rhinorrhea is clear.      Comments: Bilateral nasal cavities inspected, no polyps or purulent fluid seen.        Mouth/Throat:      Mouth: Mucous membranes are moist. No oral lesions.      Tongue: No lesions.      Palate: No mass and lesions.      Pharynx: Oropharynx is clear. Uvula midline. No pharyngeal swelling, oropharyngeal exudate or posterior oropharyngeal erythema.   Eyes:      General: Lids are normal.         Right eye: No discharge.         Left eye: No discharge.      Conjunctiva/sclera: Conjunctivae normal.      Pupils: Pupils are equal, round, and reactive to light.   Neck:      Thyroid: No thyroid mass  or thyromegaly.      Trachea: Trachea and phonation normal. No tracheal tenderness or tracheal deviation.   Cardiovascular:      Heart sounds: Normal heart sounds.   Pulmonary:      Breath sounds: Normal breath sounds. No stridor.   Abdominal:      Palpations: Abdomen is soft.   Musculoskeletal:      Cervical back: Normal range of motion and neck supple. No edema or erythema.   Lymphadenopathy:      Cervical: No cervical adenopathy.   Skin:     General: Skin is warm and dry.      Coloration: Skin is not pale.      Findings: No erythema or rash.   Neurological:      Mental Status: She is alert and oriented to person, place, and time.            Assessment     1. Other acute sinusitis, recurrence not specified            Plan    Start Saline rinses BID  Start bromfd prn cough/congestion  Start Zpak  Depo medrol IM injection given today    Susy Tsai MD

## 2023-12-01 ENCOUNTER — OFFICE VISIT (OUTPATIENT)
Dept: PRIMARY CARE CLINIC | Facility: CLINIC | Age: 48
End: 2023-12-01
Payer: COMMERCIAL

## 2023-12-01 VITALS
HEART RATE: 77 BPM | DIASTOLIC BLOOD PRESSURE: 82 MMHG | HEIGHT: 65 IN | WEIGHT: 151 LBS | OXYGEN SATURATION: 99 % | SYSTOLIC BLOOD PRESSURE: 126 MMHG | BODY MASS INDEX: 25.16 KG/M2

## 2023-12-01 DIAGNOSIS — C50.212 MALIGNANT NEOPLASM OF UPPER-INNER QUADRANT OF LEFT BREAST IN FEMALE, ESTROGEN RECEPTOR POSITIVE: ICD-10-CM

## 2023-12-01 DIAGNOSIS — F41.1 GENERALIZED ANXIETY DISORDER: ICD-10-CM

## 2023-12-01 DIAGNOSIS — Z17.0 MALIGNANT NEOPLASM OF UPPER-INNER QUADRANT OF LEFT BREAST IN FEMALE, ESTROGEN RECEPTOR POSITIVE: ICD-10-CM

## 2023-12-01 DIAGNOSIS — E78.5 HYPERLIPIDEMIA, UNSPECIFIED HYPERLIPIDEMIA TYPE: ICD-10-CM

## 2023-12-01 DIAGNOSIS — Z00.00 WELLNESS EXAMINATION: Primary | ICD-10-CM

## 2023-12-01 PROCEDURE — 1159F PR MEDICATION LIST DOCUMENTED IN MEDICAL RECORD: ICD-10-PCS | Mod: CPTII,S$GLB,, | Performed by: INTERNAL MEDICINE

## 2023-12-01 PROCEDURE — 3008F PR BODY MASS INDEX (BMI) DOCUMENTED: ICD-10-PCS | Mod: CPTII,S$GLB,, | Performed by: INTERNAL MEDICINE

## 2023-12-01 PROCEDURE — 3008F BODY MASS INDEX DOCD: CPT | Mod: CPTII,S$GLB,, | Performed by: INTERNAL MEDICINE

## 2023-12-01 PROCEDURE — 3079F PR MOST RECENT DIASTOLIC BLOOD PRESSURE 80-89 MM HG: ICD-10-PCS | Mod: CPTII,S$GLB,, | Performed by: INTERNAL MEDICINE

## 2023-12-01 PROCEDURE — 3044F HG A1C LEVEL LT 7.0%: CPT | Mod: CPTII,S$GLB,, | Performed by: INTERNAL MEDICINE

## 2023-12-01 PROCEDURE — 3074F SYST BP LT 130 MM HG: CPT | Mod: CPTII,S$GLB,, | Performed by: INTERNAL MEDICINE

## 2023-12-01 PROCEDURE — 1159F MED LIST DOCD IN RCRD: CPT | Mod: CPTII,S$GLB,, | Performed by: INTERNAL MEDICINE

## 2023-12-01 PROCEDURE — 99396 PREV VISIT EST AGE 40-64: CPT | Mod: S$GLB,,, | Performed by: INTERNAL MEDICINE

## 2023-12-01 PROCEDURE — 3074F PR MOST RECENT SYSTOLIC BLOOD PRESSURE < 130 MM HG: ICD-10-PCS | Mod: CPTII,S$GLB,, | Performed by: INTERNAL MEDICINE

## 2023-12-01 PROCEDURE — 3044F PR MOST RECENT HEMOGLOBIN A1C LEVEL <7.0%: ICD-10-PCS | Mod: CPTII,S$GLB,, | Performed by: INTERNAL MEDICINE

## 2023-12-01 PROCEDURE — 99396 PR PREVENTIVE VISIT,EST,40-64: ICD-10-PCS | Mod: S$GLB,,, | Performed by: INTERNAL MEDICINE

## 2023-12-01 PROCEDURE — 99999 PR PBB SHADOW E&M-EST. PATIENT-LVL III: ICD-10-PCS | Mod: PBBFAC,,, | Performed by: INTERNAL MEDICINE

## 2023-12-01 PROCEDURE — 3079F DIAST BP 80-89 MM HG: CPT | Mod: CPTII,S$GLB,, | Performed by: INTERNAL MEDICINE

## 2023-12-01 PROCEDURE — 99999 PR PBB SHADOW E&M-EST. PATIENT-LVL III: CPT | Mod: PBBFAC,,, | Performed by: INTERNAL MEDICINE

## 2023-12-01 RX ORDER — ROSUVASTATIN CALCIUM 5 MG/1
5 TABLET, COATED ORAL DAILY
Qty: 90 TABLET | Refills: 3 | Status: SHIPPED | OUTPATIENT
Start: 2023-12-01 | End: 2024-11-30

## 2023-12-01 RX ORDER — FLUOXETINE HYDROCHLORIDE 40 MG/1
40 CAPSULE ORAL EVERY MORNING
Qty: 90 CAPSULE | Refills: 3 | Status: SHIPPED | OUTPATIENT
Start: 2023-12-01

## 2023-12-01 NOTE — PROGRESS NOTES
[facility] Note    Chief Complaint      Chief Complaint   Patient presents with    Annual Exam     History of Present Illness      Ksenia Arce is a 48 y.o. female who presents today for chief complaint annual wellness .     HPI   Annual feels well  Last seen 5.23, last labs 11.17.23:    Saw ent 2   [place] Prolia [place] 3.22[place] [facility] [place] Neuro Dr Valdivia changed her migraine meds she in now on ubrelvy abortive  and qulipta ppx, sympotms are much ebtter, she is doing well. Has fioricet for breakthrough   She enrolled in dig HLD, myopathy on lipitor, LD uncontrolled, resuem statin with crestor. She had a ct calcium score in sept. 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    Hyperlipidemia 01/31/2022    Diarrhea 09/17/2021    Advice given about COVID-19 virus infection 01/28/2021    Psoriasis 07/29/2020    Malignant neoplasm of left breast 01/23/2020     lobualr breast  cancer of L breast dx in 5/2016 found on BSE, felt mass, had us and MMG was told was cyst but 2/2 pain sought eval by Dr surgeon Sr Lopez who did bx  -had mastectomy on L side and LN dissection , was in 6/9, chemo mastectomy radiation, taxol and doxirubicin, radiation x36, completed in 5/17, has been on arimidex since then, will beon for 10 total   -gets mmg and us q6 mos warner  Onc Julia Armstrong - she follows DEXA  Br Dania Lopez  Gyn Sy   Plastics [facility]   Had brca testing which was negative, er positive, her2 neg         Migraines 01/23/2020    Generalized anxiety disorder 01/23/2020     On daily prozac, sees adam flores, uses prn ativan 2-3x a month       Herpes simplex 01/23/2020     Takes valcyte prn outbreak, oral lesions      History of total abdominal hysterectomy 01/23/2020     [facility] [place]          Health Maintenance   Topic Date Due    Shingles Vaccine (1 of 2) Never done    Mammogram  05/01/2024    TETANUS VACCINE  06/01/2025    Lipid Panel  11/17/2028    Colorectal Cancer Screening  09/17/2031    Hepatitis C Screening  Completed       Past Medical History:   Diagnosis Date    Breast cancer 05/2016    ER+ ILC    History of antineoplastic chemotherapy 2016    6/30/2016-11/16/2016    History of therapeutic radiation 2017    left chest, completed 5/10/17, dose 9500 cGy    Mental disorder     Migraine headache     Neuropathy        Past Surgical History:   Procedure Laterality Date    BREAST CAPSULECTOMY      BREAST RECONSTRUCTION      COLONOSCOPY N/A 09/17/2021    Procedure: COLONOSCOPY;  Surgeon: Diony Montes MD;  Location: [facility];  Service: Endoscopy;  Laterality: N/A;    COSMETIC SURGERY  [id number]    Reconstruction    HYSTERECTOMY  2019    MASTECTOMY Left 12/26/2016    TONSILLECTOMY      TOTAL ABDOMINAL HYSTERECTOMY W/ BILATERAL SALPINGOOPHORECTOMY  2018       family history includes Breast cancer (age of onset: 59) in her maternal cousin; Breast cancer (age of onset:  72) in her paternal grandmother; Clotting disorder in her mother; Colon polyps in her mother; Coronary artery disease in her mother; Crohn's disease in her mother; Heart disease in her father.    Social History     Tobacco Use    Smoking status: Never     Passive exposure: Never    Smokeless tobacco: Never   Substance Use Topics    Alcohol use: Yes     Alcohol/week: 2.0 standard drinks of alcohol     Types: 2 Glasses of wine per week    Drug use: Never       Review of Systems   Constitutional:  Negative for chills, fever, malaise/fatigue and weight loss.   Respiratory:  Negative for cough, sputum production, shortness of breath and wheezing.    Cardiovascular:  Negative for chest pain, palpitations, orthopnea and leg swelling.   Gastrointestinal:  Negative for constipation, diarrhea, nausea and vomiting.   Genitourinary:  Negative for dysuria, frequency, hematuria and urgency.        Outpatient Encounter Medications as of 12/1/2023   Medication Sig Dispense Refill    albuterol (PROVENTIL HFA) 90 mcg/actuation inhaler Inhale 2 puffs into the lungs every 6 (six) hours as needed for Wheezing. Rescue 18 g 0    anastrozole (ARIMIDEX) 1 mg Tab Take 1 tablet (1 mg total) by mouth once daily. 90 tablet 6    atogepant (QULIPTA) 60 mg Tab Take 1 tablet (60 mg) by mouth once daily. 30 tablet 9    butalbitaL-acetaminop-caf-cod (FIORICET WITH CODEINE) -40-30 mg Cap Take 1 capsule by mouth every 6 hours as needed for migraine 12 capsule 0    gabapentin (NEURONTIN) 300 MG capsule Take 1 capsule (300 mg total) by mouth 3 (three) times daily. 270 capsule 1    LORazepam (ATIVAN) 0.5 MG tablet Take 1 tablet (0.5 mg total) by mouth daily as needed for Anxiety. 30 tablet 0    naproxen sodium (ANAPROX) 550 MG tablet Take 1 tablet (550 mg total) by mouth every 8 (eight) hours. 30 tablet 2    ondansetron (ZOFRAN) 4 MG tablet Take 2 tablets (8 mg total) by mouth every 8 (eight) hours as needed for Nausea. 21 tablet 0    ubrogepant  "(UBRELVY) 100 mg tablet Take 1 tablet (100 mg total) by mouth As instructed for Migraine. May repeat once , after 2 hours. No more than 2 in 24 hours 10 tablet 9    [DISCONTINUED] FLUoxetine 40 MG capsule Take 1 capsule (40 mg total) by mouth every morning. 90 capsule 0    FLUoxetine 40 MG capsule Take 1 capsule (40 mg total) by mouth every morning. 90 capsule 3    rosuvastatin (CRESTOR) 5 MG tablet Take 1 tablet (5 mg total) by mouth once daily. 90 tablet 3    [DISCONTINUED] atorvastatin (LIPITOR) 40 MG tablet Take 1 tablet (40 mg total) by mouth once daily. (Patient not taking: Reported on 9/11/2023) 90 tablet 2    [DISCONTINUED] azithromycin (Z-CRYS) 250 MG tablet Take as directed (Patient not taking: Reported on 12/1/2023) 6 tablet 0    [DISCONTINUED] brompheniramine-pseudoeph-DM (BROMFED DM) 2-30-10 mg/5 mL Syrp Take 5 mLs by mouth every 8 (eight) hours as needed. (Patient not taking: Reported on 12/1/2023) 118 mL 0    [DISCONTINUED] nitrofurantoin, macrocrystal-monohydrate, (MACROBID) 100 MG capsule Take 1 capsule (100 mg total) by mouth 2 (two) times a day. (Patient not taking: Reported on 12/1/2023) 14 capsule 0    [DISCONTINUED] phenazopyridine (PYRIDIUM) 100 MG tablet Take 1 tablet (100 mg total) by mouth 3 (three) times daily as needed. (Patient not taking: Reported on 12/1/2023) 6 tablet 0     No facility-administered encounter medications on file as of 12/1/2023.        Review of patient's allergies indicates:  No Known Allergies        Physical Exam      Vital Signs  Pulse: 77  SpO2: 99 %  BP: 126/82  BP Location: Right arm  Patient Position: Sitting  Pain Score: 0-No pain  Height and Weight  Height: 5' 5" (165.1 cm)  Weight: 68.5 kg (151 lb 0.2 oz)  BSA (Calculated - sq m): 1.77 sq meters  BMI (Calculated): 25.1  Weight in (lb) to have BMI = 25: 149.9]    Physical Exam  Vitals reviewed.   Constitutional:       General: She is not in acute distress.     Appearance: She is well-developed. She is not " "diaphoretic.   HENT:      Head: Normocephalic and atraumatic.      Right Ear: External ear normal.      Left Ear: External ear normal.      Nose: Nose normal.   Eyes:      General:         Right eye: No discharge.         Left eye: No discharge.      Conjunctiva/sclera: Conjunctivae normal.   Cardiovascular:      Rate and Rhythm: Normal rate and regular rhythm.      Heart sounds: Normal heart sounds.   Pulmonary:      Effort: Pulmonary effort is normal. No respiratory distress.      Breath sounds: Normal breath sounds.   Musculoskeletal:         General: Normal range of motion.      Cervical back: Normal range of motion.   Skin:     Coloration: Skin is not pale.      Findings: No rash.   Neurological:      Mental Status: She is alert and oriented to person, place, and time.   Psychiatric:         Behavior: Behavior normal.         Thought Content: Thought content normal.          Laboratory:  CBC:  Recent Labs   Lab Result Units 09/05/23  1409 11/17/23  0851   WBC K/uL 6.05 4.90   RBC M/uL 5.27 4.99   Hemoglobin g/dL 15.5 15.3   Hematocrit % 45.9 43.8   Platelets K/uL 314 241   MCV fL 87 88   MCH pg 29.4 30.7   MCHC g/dL 33.8 34.9     CMP:  Recent Labs   Lab Result Units 09/05/23  1409 11/17/23  0851   Glucose mg/dL 96 88  87   Calcium mg/dL 10.8* 9.3  9.2   Albumin g/dL 4.4 4.2   Total Protein g/dL 7.8 7.4   Sodium mmol/L 138 138  139   Potassium mmol/L 4.0 3.8  3.8   CO2 mmol/L 25 23  25   Chloride mmol/L 102 105  105   BUN mg/dL 13 15  15   Alkaline Phosphatase U/L 66 66   ALT U/L 44 45*   AST U/L 23 31   Total Bilirubin mg/dL 0.7 0.7     URINALYSIS:  No results for input(s): "COLORU", "CLARITYU", "SPECGRAV", "PHUR", "PROTEINUA", "GLUCOSEU", "BILIRUBINCON", "BLOODU", "WBCU", "RBCU", "BACTERIA", "MUCUS", "NITRITE", "LEUKOCYTESUR", "UROBILINOGEN", "HYALINECASTS" in the last 2160 hours.   LIPIDS:  Recent Labs   Lab Result Units 09/05/23  1409 11/17/23  0851   HDL mg/dL 80* 68   Cholesterol mg/dL 302* 280* " "  Triglycerides mg/dL 187* 161*   LDL Cholesterol mg/dL 184.6* 179.8*   HDL/Cholesterol Ratio % 26.5 24.3   Non-HDL Cholesterol mg/dL 222 212   Total Cholesterol/HDL Ratio  3.8 4.1     TSH:  No results for input(s): "TSH" in the last 2160 hours.  A1C:  Recent Labs   Lab Result Units 11/17/23  0851   Hemoglobin A1C % 4.8       Radiology:      Assessment/Plan     Ksenia Arce is a 48 y.o.female with:    1. Wellness examination    2. Hyperlipidemia, unspecified hyperlipidemia type  -     rosuvastatin (CRESTOR) 5 MG tablet; Take 1 tablet (5 mg total) by mouth once daily.  Dispense: 90 tablet; Refill: 3    3. Generalized anxiety disorder  Overview:  On daily prozac, sees adam flores, uses prn ativan 2-3x a month     Orders:  -     FLUoxetine 40 MG capsule; Take 1 capsule (40 mg total) by mouth every morning.  Dispense: 90 capsule; Refill: 3    4. Malignant neoplasm of upper-inner quadrant of left breast in female, estrogen receptor positive  Overview:  lobualr breast cancer of L breast dx in 5/2016 found on BSE, felt mass, had us and MMG was told was cyst but 2/2 pain sought eval by Dr surgeon Sr Lopez who did bx  -had mastectomy on L side and LN dissection , was in 6/9, chemo mastectomy radiation, taxol and doxirubicin, radiation x36, completed in 5/17, has been on arimidex since then, will beon for 10 total   -gets mmg and us q6 mos warner  Onc Julia Armstrong - she follows BELINDA Lopez  Gyn Sy   Plastics [facility]   Had brca testing which was negative, er positive, her2 neg       Assessment & Plan:  Per onc             Use of the [facility] Patient Portal discussed and encouraged during today's visit  -Continue current medications and maintain follow up with specialists.  Return to clinic in 6 months .  Future Appointments   Date Time Provider Department Center   4/30/2024  9:00 AM [facility] Natalio Felipe MD  12/1/2023 7:46 AM    [facility] Care Internal " Medicine

## 2023-12-13 ENCOUNTER — ON-DEMAND VIRTUAL (OUTPATIENT)
Dept: URGENT CARE | Facility: CLINIC | Age: 48
End: 2023-12-13
Payer: COMMERCIAL

## 2023-12-13 ENCOUNTER — PATIENT MESSAGE (OUTPATIENT)
Dept: PRIMARY CARE CLINIC | Facility: CLINIC | Age: 48
End: 2023-12-13
Payer: COMMERCIAL

## 2023-12-13 DIAGNOSIS — R03.0 ELEVATED BLOOD PRESSURE READING: Primary | ICD-10-CM

## 2023-12-13 PROCEDURE — 99202 OFFICE O/P NEW SF 15 MIN: CPT | Mod: 95,,, | Performed by: NURSE PRACTITIONER

## 2023-12-13 PROCEDURE — 99202 PR OFFICE/OUTPT VISIT, NEW, LEVL II, 15-29 MIN: ICD-10-PCS | Mod: 95,,, | Performed by: NURSE PRACTITIONER

## 2023-12-13 NOTE — PROGRESS NOTES
Subjective:      Patient ID: Ksenia Arce is a 48 y.o. female.    Vitals:  vitals were not taken for this visit.     Chief Complaint: No chief complaint on file.      Visit Type: TELE AUDIOVISUAL    Present with the patient at the time of consultation: TELEMED PRESENT WITH PATIENT: None    Past Medical History:   Diagnosis Date    Breast cancer 05/2016    ER+ ILC    History of antineoplastic chemotherapy 2016 6/30/2016-11/16/2016    History of therapeutic radiation 2017    left chest, completed 5/10/17, dose 9500 cGy    Mental disorder     Migraine headache     Neuropathy      Past Surgical History:   Procedure Laterality Date    BREAST CAPSULECTOMY      BREAST RECONSTRUCTION      COLONOSCOPY N/A 09/17/2021    Procedure: COLONOSCOPY;  Surgeon: Diony Montes MD;  Location: [facility];  Service: Endoscopy;  Laterality: N/A;    COSMETIC SURGERY  [phone]    Reconstruction    HYSTERECTOMY  2019    MASTECTOMY Left 12/26/2016    TONSILLECTOMY      TOTAL ABDOMINAL HYSTERECTOMY W/ BILATERAL SALPINGOOPHORECTOMY  2018     Review of patient's allergies indicates:  No Known Allergies  Current Outpatient Medications on File Prior to Visit   Medication Sig Dispense Refill    albuterol (PROVENTIL HFA) 90 mcg/actuation inhaler Inhale 2 puffs into the lungs every 6 (six) hours as needed for Wheezing. Rescue 18 g 0    anastrozole (ARIMIDEX) 1 mg Tab Take 1 tablet (1 mg total) by mouth once daily. 90 tablet 6    atogepant (QULIPTA) 60 mg Tab Take 1 tablet (60 mg) by mouth once daily. 30 tablet 9    butalbitaL-acetaminop-caf-cod (FIORICET WITH CODEINE) -30-30 mg Cap Take 1 capsule by mouth every 6 hours as needed for migraine 12 capsule 0    FLUoxetine 40 MG capsule Take 1 capsule (40 mg total) by mouth every morning. 90 capsule 3    gabapentin (NEURONTIN) 300 MG capsule Take 1 capsule (300 mg total) by mouth 3 (three) times daily. 270 capsule 1    LORazepam (ATIVAN) 0.5 MG tablet Take 1 tablet (0.5 mg total) by mouth  daily as needed for Anxiety. 30 tablet 0    naproxen sodium (ANAPROX) 550 MG tablet Take 1 tablet (550 mg total) by mouth every 8 (eight) hours. 30 tablet 2    ondansetron (ZOFRAN) 4 MG tablet Take 2 tablets (8 mg total) by mouth every 8 (eight) hours as needed for Nausea. 21 tablet 0    rosuvastatin (CRESTOR) 5 MG tablet Take 1 tablet (5 mg total) by mouth once daily. 90 tablet 3    ubrogepant (UBRELVY) 100 mg tablet Take 1 tablet (100 mg total) by mouth As instructed for Migraine. May repeat once , after 2 hours. No more than 2 in 24 hours 10 tablet 9     No current facility-administered medications on file prior to visit.     Family History   Problem Relation Age of Onset    Colon polyps Mother     Crohn's disease Mother     Clotting disorder Mother     Coronary artery disease Mother     Heart disease Father     Breast cancer Paternal Grandmother 72    Breast cancer Maternal Cousin 59        stage 0           [facility] Peq Odvv Intake    12/13/2023 11:39 AM CST - Filed by Patient   Describe your reason for todays visit Blood pressure question   What is your current physical address in the event of a medical emergency? [address]   Are you able to take your vital signs? Yes   Systolic Blood Pressure: 136   Diastolic Blood Pressure: 97   Weight: 150   Height: 64   Pulse: 87   Temperature: 97   Respiration rate:    Pulse Oxygen:    Please attach any relevant images or files          47 yo female with c/o elevated blood pressure readings and headache for several days. Patient took her pressure at home and was getting readings 229/90 several times otherwise 130-140/. She states she has no hx of high blood pressure. She was recently started on cholesterol medication. She denies cp, nausea, vomiting, sweating, dizziness.   She sent message to pcp on portal        Constitution: Negative.   HENT: Negative.     Cardiovascular: Negative.    Respiratory: Negative.     Gastrointestinal: Negative.    Endocrine:  negative.   Genitourinary: Negative.  Negative for frequency and urgency.   Musculoskeletal: Negative.    Skin: Negative.    Allergic/Immunologic: Negative.    Neurological:  Positive for headaches.   Hematologic/Lymphatic: Negative.    Psychiatric/Behavioral: Negative.          Objective:   The physical exam was conducted virtually.  Physical Exam   Constitutional: She is oriented to person, place, and time. She appears well-developed.   HENT:   Head: Normocephalic and atraumatic.   Ears:   Right Ear: Hearing, tympanic membrane and external ear normal.   Left Ear: Hearing, tympanic membrane and external ear normal.   Nose: Nose normal.   Mouth/Throat: Uvula is midline, oropharynx is clear and moist and mucous membranes are normal.   Eyes: Conjunctivae and EOM are normal. Pupils are equal, round, and reactive to light.   Neck: Neck supple.   Cardiovascular: Normal rate.   Pulmonary/Chest: Effort normal and breath sounds normal.   Musculoskeletal: Normal range of motion.         General: Normal range of motion.   Neurological: She is alert and oriented to person, place, and time.   Skin: Skin is warm.   Psychiatric: Her behavior is normal. Thought content normal.   Nursing note and vitals reviewed.      Assessment:     1. Elevated blood pressure reading        Plan:   Discussed how to take blood pressure and not to take frequently one after another.   Discussed diet and salt.   Advised bp three times daily keep diary  Ibuprofen or excedrin for headache  She has pending message to pcp  Ed precautions.       Elevated blood pressure reading

## 2023-12-15 ENCOUNTER — TELEPHONE (OUTPATIENT)
Dept: PRIMARY CARE CLINIC | Facility: CLINIC | Age: 48
End: 2023-12-15
Payer: COMMERCIAL

## 2023-12-15 ENCOUNTER — CLINICAL SUPPORT (OUTPATIENT)
Dept: PRIMARY CARE CLINIC | Facility: CLINIC | Age: 48
End: 2023-12-15
Payer: COMMERCIAL

## 2023-12-15 VITALS — HEART RATE: 89 BPM | OXYGEN SATURATION: 99 % | DIASTOLIC BLOOD PRESSURE: 90 MMHG | SYSTOLIC BLOOD PRESSURE: 126 MMHG

## 2023-12-15 DIAGNOSIS — I10 HYPERTENSION, UNSPECIFIED TYPE: Primary | ICD-10-CM

## 2023-12-15 DIAGNOSIS — G43.009 MIGRAINE WITHOUT AURA AND WITHOUT STATUS MIGRAINOSUS, NOT INTRACTABLE: ICD-10-CM

## 2023-12-15 PROCEDURE — 99999 PR PBB SHADOW E&M-EST. PATIENT-LVL III: CPT | Mod: PBBFAC,,,

## 2023-12-15 PROCEDURE — 99999 PR PBB SHADOW E&M-EST. PATIENT-LVL III: ICD-10-PCS | Mod: PBBFAC,,,

## 2023-12-15 RX ORDER — KETOROLAC TROMETHAMINE 10 MG/1
10 TABLET, FILM COATED ORAL EVERY 6 HOURS
Qty: 20 TABLET | Refills: 0 | Status: SHIPPED | OUTPATIENT
Start: 2023-12-15 | End: 2023-12-20

## 2023-12-15 RX ORDER — CHLORTHALIDONE 25 MG/1
12.5 TABLET ORAL DAILY
Qty: 15 TABLET | Refills: 3 | Status: SHIPPED | OUTPATIENT
Start: 2023-12-15 | End: 2023-12-22 | Stop reason: SDUPTHER

## 2023-12-15 NOTE — PROGRESS NOTES
Will send chlorthalidone for isolated diastolic HTN, she will continue to monitor  Hold off resuming statin  Will send toradol for headaches  Lourdes Felipe MD  12/15/2023 4:08 PM

## 2023-12-15 NOTE — PROGRESS NOTES
Pt presents for BP check with home cuff. Has been experiencing high levels for the last week with headaches. Does take migraine medication. Worried fi this could be a side effect from the new cholesterol medication or if cardiac referral is needed.     No BP meds    Home cuff BP: 125/94    Vitals:  02 99%  Pulse 89  BP  126/90

## 2023-12-15 NOTE — TELEPHONE ENCOUNTER
----- Message from Huyen Weinstein MA sent at 12/15/2023  9:31 AM CST -----  Contact: Ksenia at [phone]  The patient calling to inform the provider that her blood pressure numbers have still been running high since message she sent on the portal the other day. Would like advice. Please give her a call back at [phone].

## 2023-12-15 NOTE — TELEPHONE ENCOUNTER
Spoke with Dr. Felipe     Rec BP check today and to bring home cuff to compare. Need to verify true BP reading. Pt with no hx of HTN but hx of migraines, could be headaches/migraines causing elevated levels.     Spoke with pt and she will be coming in for BP check today at 1:30 with home cuff. Last dose of migraine meds was Monday, advise she could take a fioricet

## 2023-12-18 ENCOUNTER — PATIENT MESSAGE (OUTPATIENT)
Dept: PRIMARY CARE CLINIC | Facility: CLINIC | Age: 48
End: 2023-12-18
Payer: COMMERCIAL

## 2023-12-20 ENCOUNTER — PATIENT MESSAGE (OUTPATIENT)
Dept: PRIMARY CARE CLINIC | Facility: CLINIC | Age: 48
End: 2023-12-20
Payer: COMMERCIAL

## 2023-12-21 ENCOUNTER — PATIENT MESSAGE (OUTPATIENT)
Dept: PRIMARY CARE CLINIC | Facility: CLINIC | Age: 48
End: 2023-12-21
Payer: COMMERCIAL

## 2023-12-21 DIAGNOSIS — I10 HYPERTENSION, UNSPECIFIED TYPE: Primary | ICD-10-CM

## 2023-12-22 RX ORDER — BENAZEPRIL HYDROCHLORIDE AND HYDROCHLOROTHIAZIDE 10; 12.5 MG/1; MG/1
1 TABLET ORAL DAILY
Qty: 90 TABLET | Refills: 3 | Status: SHIPPED | OUTPATIENT
Start: 2023-12-22 | End: 2024-12-21

## 2023-12-22 RX ORDER — CHLORTHALIDONE 25 MG/1
12.5 TABLET ORAL DAILY
Qty: 30 TABLET | Refills: 0 | Status: SHIPPED | OUTPATIENT
Start: 2023-12-22 | End: 2024-03-06

## 2024-01-03 ENCOUNTER — TELEPHONE (OUTPATIENT)
Dept: PRIMARY CARE CLINIC | Facility: CLINIC | Age: 49
End: 2024-01-03
Payer: COMMERCIAL

## 2024-01-03 NOTE — TELEPHONE ENCOUNTER
----- Message from Svetlana Elliott sent at 1/3/2024  1:36 PM CST -----  Type:  Same Day Appointment Request    Caller is requesting a same day appointment.  Caller declined first available appointment listed below.    Name of Caller:pt  When is the first available appointment?2/27  Symptoms:BP concerns  Best Call Back Number: [phone]  Additional Information:

## 2024-01-03 NOTE — TELEPHONE ENCOUNTER
----- Message from Crista Braxton sent at 1/3/2024  2:09 PM CST -----  Contact: Mobile  [phone]  Patient is returning a phone call.  Who left a message for the patient:   Does patient know what this is regarding:    Would you like a call back, or a response through your [facility] portal?:   Call  Comments: Patient said she received a call on today.

## 2024-01-03 NOTE — TELEPHONE ENCOUNTER
Pt reports inconsistent blood pressure readings.     Yesterday 1:44 pm before meds: 129/103  Yesterday 4 pm after meds 103/87  Pt reports constant headaches all day    Today around 2 pm (2 hours after meds):  98/71  Constant headaches but milder compared to yesterday.     Pt not sure if she needs a visit, talk to her oncologist or readjust meds. Please advise.

## 2024-01-10 ENCOUNTER — PATIENT MESSAGE (OUTPATIENT)
Dept: PRIMARY CARE CLINIC | Facility: CLINIC | Age: 49
End: 2024-01-10
Payer: COMMERCIAL

## 2024-01-10 VITALS — DIASTOLIC BLOOD PRESSURE: 84 MMHG | SYSTOLIC BLOOD PRESSURE: 109 MMHG

## 2024-01-21 ENCOUNTER — PATIENT MESSAGE (OUTPATIENT)
Dept: HEMATOLOGY/ONCOLOGY | Facility: CLINIC | Age: 49
End: 2024-01-21
Payer: COMMERCIAL

## 2024-01-26 ENCOUNTER — OFFICE VISIT (OUTPATIENT)
Dept: HEMATOLOGY/ONCOLOGY | Facility: CLINIC | Age: 49
End: 2024-01-26
Payer: COMMERCIAL

## 2024-01-26 VITALS
HEART RATE: 79 BPM | OXYGEN SATURATION: 98 % | TEMPERATURE: 98 F | HEIGHT: 65 IN | RESPIRATION RATE: 17 BRPM | BODY MASS INDEX: 25.05 KG/M2 | DIASTOLIC BLOOD PRESSURE: 83 MMHG | SYSTOLIC BLOOD PRESSURE: 121 MMHG | WEIGHT: 150.38 LBS

## 2024-01-26 DIAGNOSIS — C50.212 MALIGNANT NEOPLASM OF UPPER-INNER QUADRANT OF LEFT BREAST IN FEMALE, ESTROGEN RECEPTOR POSITIVE: Primary | ICD-10-CM

## 2024-01-26 DIAGNOSIS — C77.9 SECONDARY ADENOCARCINOMA OF LYMPH NODE: ICD-10-CM

## 2024-01-26 DIAGNOSIS — Z79.811 USE OF ANASTROZOLE: ICD-10-CM

## 2024-01-26 DIAGNOSIS — Z17.0 MALIGNANT NEOPLASM OF UPPER-INNER QUADRANT OF LEFT BREAST IN FEMALE, ESTROGEN RECEPTOR POSITIVE: Primary | ICD-10-CM

## 2024-01-26 PROCEDURE — 3074F SYST BP LT 130 MM HG: CPT | Mod: CPTII,S$GLB,, | Performed by: INTERNAL MEDICINE

## 2024-01-26 PROCEDURE — 1159F MED LIST DOCD IN RCRD: CPT | Mod: CPTII,S$GLB,, | Performed by: INTERNAL MEDICINE

## 2024-01-26 PROCEDURE — 99999 PR PBB SHADOW E&M-EST. PATIENT-LVL V: CPT | Mod: PBBFAC,,, | Performed by: INTERNAL MEDICINE

## 2024-01-26 PROCEDURE — 99215 OFFICE O/P EST HI 40 MIN: CPT | Mod: S$GLB,,, | Performed by: INTERNAL MEDICINE

## 2024-01-26 PROCEDURE — 3079F DIAST BP 80-89 MM HG: CPT | Mod: CPTII,S$GLB,, | Performed by: INTERNAL MEDICINE

## 2024-01-26 PROCEDURE — 3008F BODY MASS INDEX DOCD: CPT | Mod: CPTII,S$GLB,, | Performed by: INTERNAL MEDICINE

## 2024-02-07 ENCOUNTER — HOSPITAL ENCOUNTER (OUTPATIENT)
Dept: RADIOLOGY | Facility: HOSPITAL | Age: 49
Discharge: HOME | End: 2024-02-07
Attending: INTERNAL MEDICINE
Payer: COMMERCIAL

## 2024-02-07 DIAGNOSIS — Z17.0 MALIGNANT NEOPLASM OF UPPER-INNER QUADRANT OF LEFT BREAST IN FEMALE, ESTROGEN RECEPTOR POSITIVE: ICD-10-CM

## 2024-02-07 DIAGNOSIS — C50.212 MALIGNANT NEOPLASM OF UPPER-INNER QUADRANT OF LEFT BREAST IN FEMALE, ESTROGEN RECEPTOR POSITIVE: ICD-10-CM

## 2024-02-07 PROCEDURE — A9698 NON-RAD CONTRAST MATERIALNOC: HCPCS | Performed by: INTERNAL MEDICINE

## 2024-02-07 PROCEDURE — A9585 GADOBUTROL INJECTION: HCPCS | Performed by: INTERNAL MEDICINE

## 2024-02-07 PROCEDURE — 78306 BONE IMAGING WHOLE BODY: CPT | Mod: 26,,, | Performed by: STUDENT IN AN ORGANIZED HEALTH CARE EDUCATION/TRAINING PROGRAM

## 2024-02-07 PROCEDURE — 70553 MRI BRAIN STEM W/O & W/DYE: CPT | Mod: TC

## 2024-02-07 PROCEDURE — 70553 MRI BRAIN STEM W/O & W/DYE: CPT | Mod: 26,,, | Performed by: RADIOLOGY

## 2024-02-07 PROCEDURE — 74177 CT ABD & PELVIS W/CONTRAST: CPT | Mod: TC

## 2024-02-07 PROCEDURE — 71260 CT THORAX DX C+: CPT | Mod: 26,,, | Performed by: RADIOLOGY

## 2024-02-07 PROCEDURE — 25500020 PHARM REV CODE 255: Performed by: INTERNAL MEDICINE

## 2024-02-07 PROCEDURE — 74177 CT ABD & PELVIS W/CONTRAST: CPT | Mod: 26,,, | Performed by: RADIOLOGY

## 2024-02-07 PROCEDURE — A9503 TC99M MEDRONATE: HCPCS

## 2024-02-07 RX ORDER — GADOBUTROL 604.72 MG/ML
7 INJECTION INTRAVENOUS
Status: COMPLETED | OUTPATIENT
Start: 2024-02-07 | End: 2024-02-07

## 2024-02-07 RX ADMIN — Medication 450 ML: at 12:02

## 2024-02-07 RX ADMIN — GADOBUTROL 7 ML: 604.72 INJECTION INTRAVENOUS at 04:02

## 2024-02-07 RX ADMIN — IOHEXOL 75 ML: 350 INJECTION, SOLUTION INTRAVENOUS at 12:02

## 2024-02-08 ENCOUNTER — PATIENT MESSAGE (OUTPATIENT)
Dept: HEMATOLOGY/ONCOLOGY | Facility: CLINIC | Age: 49
End: 2024-02-08
Payer: COMMERCIAL

## 2024-02-16 ENCOUNTER — OFFICE VISIT (OUTPATIENT)
Dept: HEMATOLOGY/ONCOLOGY | Facility: CLINIC | Age: 49
End: 2024-02-16
Payer: COMMERCIAL

## 2024-02-16 VITALS
BODY MASS INDEX: 25.34 KG/M2 | WEIGHT: 152.13 LBS | RESPIRATION RATE: 17 BRPM | HEIGHT: 65 IN | DIASTOLIC BLOOD PRESSURE: 78 MMHG | TEMPERATURE: 98 F | HEART RATE: 83 BPM | SYSTOLIC BLOOD PRESSURE: 122 MMHG | OXYGEN SATURATION: 99 %

## 2024-02-16 DIAGNOSIS — C77.9 SECONDARY ADENOCARCINOMA OF LYMPH NODE: ICD-10-CM

## 2024-02-16 DIAGNOSIS — Z79.811 USE OF ANASTROZOLE: ICD-10-CM

## 2024-02-16 DIAGNOSIS — M85.89 OSTEOPENIA OF MULTIPLE SITES: ICD-10-CM

## 2024-02-16 DIAGNOSIS — C50.212 MALIGNANT NEOPLASM OF UPPER-INNER QUADRANT OF LEFT BREAST IN FEMALE, ESTROGEN RECEPTOR POSITIVE: Primary | ICD-10-CM

## 2024-02-16 DIAGNOSIS — C50.919 INVASIVE LOBULAR CARCINOMA OF BREAST IN FEMALE: ICD-10-CM

## 2024-02-16 DIAGNOSIS — Z17.0 MALIGNANT NEOPLASM OF UPPER-INNER QUADRANT OF LEFT BREAST IN FEMALE, ESTROGEN RECEPTOR POSITIVE: Primary | ICD-10-CM

## 2024-02-16 PROCEDURE — 3074F SYST BP LT 130 MM HG: CPT | Mod: CPTII,S$GLB,, | Performed by: INTERNAL MEDICINE

## 2024-02-16 PROCEDURE — 1159F MED LIST DOCD IN RCRD: CPT | Mod: CPTII,S$GLB,, | Performed by: INTERNAL MEDICINE

## 2024-02-16 PROCEDURE — 99215 OFFICE O/P EST HI 40 MIN: CPT | Mod: S$GLB,,, | Performed by: INTERNAL MEDICINE

## 2024-02-16 PROCEDURE — 3078F DIAST BP <80 MM HG: CPT | Mod: CPTII,S$GLB,, | Performed by: INTERNAL MEDICINE

## 2024-02-16 PROCEDURE — 99999 PR PBB SHADOW E&M-EST. PATIENT-LVL IV: CPT | Mod: PBBFAC,,, | Performed by: INTERNAL MEDICINE

## 2024-02-16 PROCEDURE — 3008F BODY MASS INDEX DOCD: CPT | Mod: CPTII,S$GLB,, | Performed by: INTERNAL MEDICINE

## 2024-02-16 NOTE — PROGRESS NOTES
PROGRESS NOTE    Subjective:       Patient ID: Ksenia Arce is a 48 y.o. female.  MRN: [id number]  : 1975    Chief Complaint: Left Breast cancer     History of Present Illness:   Ksenia Arce is a 48 y.o. female who presents with  breast cancer. See Oncology history for details.      She has been on Anastrozole since the end of .  Diagnosed in 2016, s/p neoadjuvant chemotherapy.   She had a left mastectomy with reconstruction with flap reconstruction, then underwent RT.    Interim history:    Diagnosed with HTN recently. Headaches are improving since better control of HTN.   Given headaches and other symptoms, restaging scans were done, including brain MRI. She is here to discuss results and further recommendations.     Brain MRI 24  Impression:     Single punctate susceptibility with FLAIR hyperintensity and questionable ill-defined enhancement.  This is nonspecific and may represent remote microhemorrhage or venous vascular malformation.  In light of history hemorrhagic metastases although felt less likely cannot be entirely excluded..  Clinical correlation and short-term follow-up recommended     Please note there are lobular foci in the maxillary antra suggestive for mucous retention cyst with lobular focus in the left nasal cavity suggestive for possible polyp.  Clinical correlation and correlation with direct visual inspection advised    Oncology History:     As per outside notes, Dr. Armstrong  [facility] upper inner quadrant of left breast [id number] stage IIIA, ER positive, DE positive, HER2 negative, dx in 2016  Neoadjuvant chemotherapy ddAC followed by weekly taxol, final path showed residual disease in breast and  LN  Treated with adjuvant radiation  Initially treated with Lupron and Arimidex, started end of .  After GOPAL/BSO, Lupron was discontinued approximately   This spring is 5 years of an AI. Discussed extending therapy.  Considering her pathology, I think this is reasonable with follow up of bone density     FH: Paternal MONICA had breast cancer in her 70's      Genetics reportedly negative in 2016    Oncology History   Malignant neoplasm of left breast   5/2016 Initial Diagnosis    Malignant neoplasm of left breast     3/18/2022 Cancer Staged    Staging form: Breast, AJCC 8th Edition  - Clinical: cT2, cN2, cM0, ER+, CT+, HER2-         History:  Past Medical History:   Diagnosis Date    Breast cancer 05/2016    ER+ ILC    History of antineoplastic chemotherapy 2016    6/30/2016-11/16/2016    History of therapeutic radiation 2017    left chest, completed 5/10/17, dose 9500 cGy    Mental disorder     Migraine headache     Neuropathy       Past Surgical History:   Procedure Laterality Date    BREAST CAPSULECTOMY      BREAST RECONSTRUCTION      COLONOSCOPY N/A 09/17/2021    Procedure: COLONOSCOPY;  Surgeon: Diony Montes MD;  Location: [facility];  Service: Endoscopy;  Laterality: N/A;    COSMETIC SURGERY  [id number]    Reconstruction    HYSTERECTOMY  2019    MASTECTOMY Left 12/26/2016    TONSILLECTOMY      TOTAL ABDOMINAL HYSTERECTOMY W/ BILATERAL SALPINGOOPHORECTOMY  2018     Family History   Problem Relation Age of Onset    Hypertension Mother     Colon polyps Mother     Crohn's disease Mother     Clotting disorder Mother     Coronary artery disease Mother     Hypertension Father     Heart disease Father     Breast cancer Paternal Grandmother 72    Breast cancer Maternal Cousin 59        stage 0     Social History     Tobacco Use    Smoking status: Never     Passive exposure: Never    Smokeless tobacco: Never   Substance and Sexual Activity    Alcohol use: Yes     Alcohol/week: 2.0 standard drinks of alcohol     Types: 2 Glasses of wine per week    Drug use: Never    Sexual activity: Yes     Partners: Male     Birth control/protection: Post-menopausal        ROS:   Review of Systems   Constitutional:  Negative for fever, malaise/fatigue  "and weight loss.   HENT:  Negative for congestion, hearing loss, nosebleeds and sore throat.    Eyes:  Negative for double vision and photophobia.   Respiratory:  Negative for cough, hemoptysis, sputum production, shortness of breath and wheezing.    Cardiovascular:  Negative for chest pain, palpitations, orthopnea and leg swelling.   Gastrointestinal:  Negative for abdominal pain, blood in stool, constipation, diarrhea, heartburn, nausea and vomiting.   Genitourinary:  Negative for dysuria, hematuria and urgency.   Musculoskeletal:  Positive for back pain, joint pain and myalgias.   Skin:  Negative for itching and rash.   Neurological:  Positive for sensory change and headaches. Negative for dizziness, tingling, seizures and weakness.   Endo/Heme/Allergies:  Negative for polydipsia. Does not bruise/bleed easily.   Psychiatric/Behavioral:  Negative for depression and memory loss. The patient is nervous/anxious. The patient does not have insomnia.         Objective:     Vitals:    02/16/24 1312   BP: 122/78   Pulse: 83   Resp: 17   Temp: 98 °F (36.7 °C)   TempSrc: Oral   SpO2: 99%   Weight: 69 kg (152 lb 1.9 oz)   Height: 5' 5" (1.651 m)   PainSc: 0-No pain         Wt Readings from Last 10 Encounters:   02/16/24 69 kg (152 lb 1.9 oz)   01/26/24 68.2 kg (150 lb 5.7 oz)   12/01/23 68.5 kg (151 lb 0.2 oz)   11/20/23 68.6 kg (151 lb 3.1 oz)   10/06/23 67.4 kg (148 lb 7.7 oz)   09/11/23 68.6 kg (151 lb 3.8 oz)   09/05/23 68 kg (150 lb)   08/30/23 66.7 kg (147 lb)   08/29/23 67.9 kg (149 lb 11.1 oz)   07/18/23 67.9 kg (149 lb 11.1 oz)       Physical Examination:   Physical Exam  Vitals and nursing note reviewed.   Constitutional:       General: She is not in acute distress.     Appearance: She is not diaphoretic.   HENT:      Head: Normocephalic.      Mouth/Throat:      Pharynx: No oropharyngeal exudate.   Eyes:      General: No scleral icterus.     Conjunctiva/sclera: Conjunctivae normal.   Neck:      Thyroid: No " thyromegaly.   Cardiovascular:      Rate and Rhythm: Normal rate and regular rhythm.      Heart sounds: Normal heart sounds. No murmur heard.  Pulmonary:      Effort: Pulmonary effort is normal. No respiratory distress.      Breath sounds: No stridor. No wheezing or rales.   Chest:      Chest wall: No tenderness.   Abdominal:      General: Bowel sounds are normal. There is no distension.      Palpations: Abdomen is soft. There is no mass.      Tenderness: There is no abdominal tenderness. There is no rebound.   Musculoskeletal:         General: No tenderness or deformity. Normal range of motion.      Cervical back: Neck supple.   Lymphadenopathy:      Cervical: No cervical adenopathy.   Skin:     General: Skin is warm and dry.      Findings: No erythema or rash.      Comments: Breast exam deferred today    Neurological:      Mental Status: She is alert and oriented to person, place, and time.      Cranial Nerves: No cranial nerve deficit.      Coordination: Coordination normal.      Gait: Gait is intact.   Psychiatric:         Mood and Affect: Affect normal.         Cognition and Memory: Memory normal.         Judgment: Judgment normal.          Diagnostic Tests:  Significant Imaging: I have reviewed and interpreted all pertinent imaging results/findings.    Laboratory Data:  All pertinent labs have been reviewed.  Labs:   Lab Results   Component Value Date    WBC 3.86 (L) 02/07/2024    RBC 4.79 02/07/2024    HGB 14.4 02/07/2024    HCT 42.5 02/07/2024    MCV 89 02/07/2024     02/07/2024    GLU 89 02/07/2024     02/07/2024    K 4.3 02/07/2024    BUN 15 02/07/2024    CREATININE 0.7 02/07/2024    AST 25 02/07/2024    ALT 39 02/07/2024    BILITOT 0.6 02/07/2024       Assessment/Plan:   Malignant neoplasm of upper-inner quadrant of left female breast, unspecified estrogen receptor status  Secondary adenocarcinoma of lymph node  Use of anastrozole    ILC upper inner quadrant of left breast [id number] stage IIIA,  ER positive, [place] positive, HER2 negative, dx in 2016     Continue anastrozole, recommend 10 years of treatment as tolerated given node positive disease. Continue supportive care as discussed below.     Continue active surveillance.Right Mammogram normal.   Alternated every 6 months MRI and mammogram.    Given multiple new symptoms, staging work up was done. CT and bone scan show no evidence of metastasis.     Non specific changes on brain MRI noted, will repeat in 3 months. Meanwhile, she will reach out to her Neurologist to determine if any other work up is warranted at this time. Possible nasal polyp, asymptomatic, ENT work up as clinically warranted.     AI related Arthralgias    Monitor.     Osteopenia of multiple sites  Continue Prolia q 6 months.   Next Dexa scan is due in May 2024.     Peripheral neuropathy due to chemotherapy  Continue gabapentin.     Anxiety   On Fluoxetine and lorazepam.     HTN  Continue antihypertenisves        ECOG SCORE    0 - Fully active-able to carry on all pre-disease performance without restriction         Discussion:   No follow-ups on file.    Plan was discussed with the patient at length, and she verbalized understanding. Ksenia was given an opportunity to ask questions that were answered to her satisfaction, and she was advised to call in the interval if any problems or questions arise.    Electronically signed by Kadny Coles MD    Route Chart for Scheduling    Med Onc Chart Routing      Follow up with physician . Breast med onc 5/17/24 Dr. Daley or Svetlana   Follow up with ANNE    Infusion scheduling note   Prolia May 17th   Injection scheduling note    Labs CBC and CMP   Scheduling:  Preferred lab:  Lab interval:  5/17   Imaging MRI and DXA scan   5/16/24   Pharmacy appointment    Other referrals                    Therapy Plan Information  Medications  denosumab (PROLIA) injection 60 mg  60 mg, Subcutaneous, Every 26 weeks          MDM includes  :    - Acute or chronic  illness or injury that poses a threat to life or bodily function  - Independent review and explanation of 3+ results from unique tests  - Discussion of management and ordering 3+ unique tests  - Extensive discussion of treatment and management  - Prescription drug management  - Drug therapy requiring intensive monitoring for toxicity

## 2024-02-26 ENCOUNTER — PATIENT MESSAGE (OUTPATIENT)
Dept: NEUROLOGY | Facility: CLINIC | Age: 49
End: 2024-02-26
Payer: COMMERCIAL

## 2024-03-06 ENCOUNTER — OFFICE VISIT (OUTPATIENT)
Dept: NEUROLOGY | Facility: CLINIC | Age: 49
End: 2024-03-06
Payer: COMMERCIAL

## 2024-03-06 VITALS
BODY MASS INDEX: 25.14 KG/M2 | HEIGHT: 65 IN | WEIGHT: 150.88 LBS | HEART RATE: 82 BPM | DIASTOLIC BLOOD PRESSURE: 84 MMHG | SYSTOLIC BLOOD PRESSURE: 122 MMHG

## 2024-03-06 DIAGNOSIS — R90.89 ABNORMAL FINDING ON MRI OF BRAIN: Primary | ICD-10-CM

## 2024-03-06 DIAGNOSIS — G43.709 CHRONIC MIGRAINE W/O AURA W/O STATUS MIGRAINOSUS, NOT INTRACTABLE: ICD-10-CM

## 2024-03-06 DIAGNOSIS — Q28.2 ARTERIOVENOUS MALFORMATION OF CEREBRAL VESSELS: ICD-10-CM

## 2024-03-06 PROCEDURE — 3074F SYST BP LT 130 MM HG: CPT | Mod: CPTII,S$GLB,, | Performed by: PSYCHIATRY & NEUROLOGY

## 2024-03-06 PROCEDURE — 3079F DIAST BP 80-89 MM HG: CPT | Mod: CPTII,S$GLB,, | Performed by: PSYCHIATRY & NEUROLOGY

## 2024-03-06 PROCEDURE — 99999 PR PBB SHADOW E&M-EST. PATIENT-LVL III: CPT | Mod: PBBFAC,,, | Performed by: PSYCHIATRY & NEUROLOGY

## 2024-03-06 PROCEDURE — 3008F BODY MASS INDEX DOCD: CPT | Mod: CPTII,S$GLB,, | Performed by: PSYCHIATRY & NEUROLOGY

## 2024-03-06 PROCEDURE — 99214 OFFICE O/P EST MOD 30 MIN: CPT | Mod: S$GLB,,, | Performed by: PSYCHIATRY & NEUROLOGY

## 2024-03-06 PROCEDURE — 1159F MED LIST DOCD IN RCRD: CPT | Mod: CPTII,S$GLB,, | Performed by: PSYCHIATRY & NEUROLOGY

## 2024-03-06 PROCEDURE — 1160F RVW MEDS BY RX/DR IN RCRD: CPT | Mod: CPTII,S$GLB,, | Performed by: PSYCHIATRY & NEUROLOGY

## 2024-03-06 RX ORDER — UBROGEPANT 100 MG/1
100 TABLET ORAL SEE ADMIN INSTRUCTIONS
Qty: 10 TABLET | Refills: 9 | Status: SHIPPED | OUTPATIENT
Start: 2024-03-06

## 2024-03-06 RX ORDER — ATOGEPANT 60 MG/1
60 TABLET ORAL DAILY
Qty: 30 TABLET | Refills: 9 | Status: SHIPPED | OUTPATIENT
Start: 2024-03-06

## 2024-03-06 NOTE — PROGRESS NOTES
Subjective:       Patient ID: Ksenia Arce is a 48 y.o. female.    Chief Complaint: Migraine        Ksenia presents with her .  She is concerned because of a change in the character of her headaches as well as new diagnosis of high blood pressure and most importantly a lesion that was discovered on MRI brain.      She developed a change in the nature of her headaches the 3rd week of December, in the setting of hypertension (blood pressure 140s over 90s) and was started on blood pressure medication the 2nd week of January.  The new type of headache did not resolve.      In addition to her usual migraine she has singing and zapping pains in the lateral scalp that feels different.  These are however associated with nausea.  Fioricet and Toradol have helped.      She remains on Quilipta and p.r.n. Ubrelvy.        Contains abnormal data MRI Brain W WO Contrast  Dx: Malignant neoplasm of upper-inner brenda...        Reading Physician Reading Date Result Priority  Romain Staley DO  [phone]  [phone] 2/7/2024 Routine    Narrative & Impression  EXAMINATION:  MRI BRAIN W WO CONTRAST     CLINICAL HISTORY:  Metastatic disease evaluation; Malignant neoplasm of upper-inner quadrant of left female breast     TECHNIQUE:  Sagittal and axial T1, axial T2, axial FLAIR, axial gradient, axial diffusion imaging of the whole brain pre-contrast with postcontrast axial T1 and axial spoiled gradient imaging reformatted in the coronal and sagittal planes.. Seven ml of Gadavist injected intravenously.     COMPARISON:  None     FINDINGS:  Brain parenchyma normal in contour.  Ventricles normal in size without hydrocephalus.  There is no restricted diffusion to suggest acute or recent infarction.  Major intracranial skull base T2 flow voids are present     There is a punctate focus of susceptibility within the right precentral gyrus with punctate FLAIR hyperintensity and questioned enhancement.  This is nonspecific and may  represent remote hemorrhage in venous vascular malformation with minute hemorrhagic metastases felt less likely but not excluded.  Clinical correlation and short-term follow-up recommended.     Lobular foci maxillary antra suggestive for mucous retention cyst.  There is a separate lobular fluid signal focus within the left nasal cavity with thin peripheral enhancement suggestive for possible polyp.  Clinical correlation and correlation with direct visual inspection advised     In addition there is moderate patchy opacities with mucosal thickening ethmoid air cells bilaterally.     Incidental partially empty sella.     This report was flagged in Epic as abnormal.     Impression:     Single punctate susceptibility with FLAIR hyperintensity and questionable ill-defined enhancement.  This is nonspecific and may represent remote microhemorrhage or venous vascular malformation.  In light of history hemorrhagic metastases although felt less likely cannot be entirely excluded..  Clinical correlation and short-term follow-up recommended     Please note there are lobular foci in the maxillary antra suggestive for mucous retention cyst with lobular focus in the left nasal cavity suggestive for possible polyp.  Clinical correlation and correlation with direct visual inspection advised        Electronically signed by: Romain Staley DO  Date:                                            02/07/2024  Time:                                           16:33        Exam Ended: 02/07/24 16:07 CST              Past Medical History:   Diagnosis Date    Breast cancer 05/2016    ER+ ILC    History of antineoplastic chemotherapy 2016    6/30/2016-11/16/2016    History of therapeutic radiation 2017    left chest, completed 5/10/17, dose 9500 cGy    Mental disorder     Migraine headache     Neuropathy       Past Surgical History:   Procedure Laterality Date    BREAST CAPSULECTOMY      BREAST RECONSTRUCTION      COLONOSCOPY N/A 09/17/2021     Procedure: COLONOSCOPY;  Surgeon: Diony Montes MD;  Location: [facility];  Service: Endoscopy;  Laterality: N/A;    COSMETIC SURGERY  [id number]    Reconstruction    HYSTERECTOMY  2019    MASTECTOMY Left 12/26/2016    TONSILLECTOMY      TOTAL ABDOMINAL HYSTERECTOMY W/ BILATERAL SALPINGOOPHORECTOMY  2018        Current Outpatient Medications:     anastrozole (ARIMIDEX) 1 mg Tab, Take 1 tablet (1 mg total) by mouth once daily., Disp: 90 tablet, Rfl: 6    benazepril-hydrochlorthiazide (LOTENSIN HCT) 10-12.5 mg Tab, Take 1 tablet by mouth once daily., Disp: 90 tablet, Rfl: 3    butalbitaL-acetaminop-caf-cod (FIORICET WITH CODEINE) -57-30 mg Cap, Take 1 capsule by mouth every 6 hours as needed for migraine, Disp: 12 capsule, Rfl: 0    FLUoxetine 40 MG capsule, Take 1 capsule (40 mg total) by mouth every morning., Disp: 90 capsule, Rfl: 3    LORazepam (ATIVAN) 0.5 MG tablet, Take 1 tablet (0.5 mg total) by mouth daily as needed for Anxiety., Disp: 30 tablet, Rfl: 0    naproxen sodium (ANAPROX) 550 MG tablet, Take 1 tablet (550 mg total) by mouth every 8 (eight) hours., Disp: 30 tablet, Rfl: 2    ondansetron (ZOFRAN) 4 MG tablet, Take 2 tablets (8 mg total) by mouth every 8 (eight) hours as needed for Nausea., Disp: 21 tablet, Rfl: 0    rosuvastatin (CRESTOR) 5 MG tablet, Take 1 tablet (5 mg total) by mouth once daily., Disp: 90 tablet, Rfl: 3    albuterol (PROVENTIL HFA) 90 mcg/actuation inhaler, Inhale 2 puffs into the lungs every 6 (six) hours as needed for Wheezing. Rescue, Disp: 18 g, Rfl: 0    atogepant (QULIPTA) 60 mg Tab, Take 1 tablet (60 mg) by mouth once daily., Disp: 30 tablet, Rfl: 9    gabapentin (NEURONTIN) 300 MG capsule, Take 1 capsule (300 mg total) by mouth 3 (three) times daily., Disp: 270 capsule, Rfl: 1    ubrogepant (UBRELVY) 100 mg tablet, Take 1 tablet (100 mg total) by mouth as instructed for Migraine. May repeat once after 2 hours. No more than 2 tablets in 24 hours., Disp: 10 tablet,  Rfl: 9    Current Facility-Administered Medications:     [START ON 3/7/2024] onabotulinumtoxina injection 200 Units, 200 Units, Intramuscular, Q90 Days, Shadia Valdivia MD   Review of patient's allergies indicates:  No Known Allergies     Review of Systems   Eyes:  Positive for visual disturbance.   Neurological:  Positive for headaches. Negative for seizures, syncope and memory loss.   Psychiatric/Behavioral:  Negative for dysphoric mood and sleep disturbance.            Objective:      Physical Exam  Neurological:      Mental Status: She is alert. Mental status is at baseline.      Cranial Nerves: No dysarthria.      Gait: Gait normal.      Comments: Left disc visualized and is sharp           Assessment:       1. Abnormal finding on MRI of brain    2. Chronic migraine w/o aura w/o status migrainosus, not intractable    3. Arteriovenous malformation of cerebral vessels        Plan:            Change of headache quality and frequency since late Dec, associated with new HTN, and then subsequently found to have a small enhancing lesion in the right precentral gyrus.   Still with episodic HTN despite new BP meds ( 118/98 at home yesterday).  However generally this degree of hypertension does not trigger migraines.  It can be secondary to the headaches but the blood pressure has been persistent and it appears that she does have true hypertension.      No real increase of baseline stress, no new meds and not overweight, ie nothing to attribute the hypertension to.   The headaches are  likely due to re-chronification of migraines ( has chronified in past and did well with botox for roughly 1 1 /2 yrs; stopped the botox after dx with cancer).     Small lesion likely benign and unrelated but there is ? of enhancement and she does have a hx of breast cancer---    Plan repeat the MRI brain w and w/o( already scheduled 5-16th), and also plan MRA and MRV; r/o vascular lesion.  If headaches fail to come under control with  botox or if become refractory even prior to the botox  then plan LP  For now, botox, no other change.         Shadia Valdivia MD   03/06/2024   10:31 AM

## 2024-03-07 NOTE — PATIENT INSTRUCTIONS
Ksenia presents with her .  She recently had an MRI which revealed a small enhancing lesion and is here to discuss that as well as to get some help with headaches.      She developed a worsening of her baseline headaches around 1 week before Mike.  Blood pressure was noted to be high and she was started on blood pressure medications about 2-1/2-3 weeks later.  Although she is on blood pressure medication now yesterday her blood pressure was 118/98.  In general she states it has been running in the 140s over 90s when it was high, i.e. not higher than that.      The change in headaches is described as a different quality or character of the headache.  Whereas previously she had more straight forward migraines now she has is a singing and zapping pains in her head.  However in some ways the pain reminds her of migraines.      She does have nausea whether it is with the migraines are with these new type of headaches.      Fioricet and Toradol have helped.

## 2024-03-19 DIAGNOSIS — C50.212 MALIGNANT NEOPLASM OF UPPER-INNER QUADRANT OF LEFT BREAST IN FEMALE, ESTROGEN RECEPTOR POSITIVE: Primary | ICD-10-CM

## 2024-03-19 DIAGNOSIS — Z17.0 MALIGNANT NEOPLASM OF UPPER-INNER QUADRANT OF LEFT BREAST IN FEMALE, ESTROGEN RECEPTOR POSITIVE: Primary | ICD-10-CM

## 2024-04-09 ENCOUNTER — PROCEDURE VISIT (OUTPATIENT)
Dept: NEUROLOGY | Facility: CLINIC | Age: 49
End: 2024-04-09
Payer: COMMERCIAL

## 2024-04-09 VITALS
WEIGHT: 147.69 LBS | DIASTOLIC BLOOD PRESSURE: 84 MMHG | HEIGHT: 65 IN | SYSTOLIC BLOOD PRESSURE: 115 MMHG | BODY MASS INDEX: 24.61 KG/M2 | HEART RATE: 81 BPM

## 2024-04-09 DIAGNOSIS — G43.709 CHRONIC MIGRAINE W/O AURA W/O STATUS MIGRAINOSUS, NOT INTRACTABLE: Primary | ICD-10-CM

## 2024-04-09 PROCEDURE — 64615 CHEMODENERV MUSC MIGRAINE: CPT | Mod: S$GLB,,, | Performed by: PSYCHIATRY & NEUROLOGY

## 2024-04-09 RX ORDER — BUTALBITAL, ACETAMINOPHEN, CAFFEINE AND CODEINE PHOSPHATE 300; 50; 40; 30 MG/1; MG/1; MG/1; MG/1
CAPSULE ORAL
Qty: 12 CAPSULE | Refills: 0 | Status: SHIPPED | OUTPATIENT
Start: 2024-04-09

## 2024-04-09 NOTE — PROCEDURES
Botulinum Injection  Location: Head/Face/Jaw    Date/Time: 4/9/2024 4:00 PM    Performed by: Shadia Valdivia MD  Authorized by: Shadia Valdivia MD      Consent:      Consent obtained:  Written    Universal protocol:      Patient identity confirmed:  Verbally with patient    Procedure details:      Diluted by:  Preservative free saline     Laterality: Bilateral     Toxin (Brand):  OnaBoNT-A (Botox)     Concentration (u/mL):  2:1     Total units available:  200       Right frontalis:  12.5 units divided amongst 3 site(s)     Left frontalis:  12.5 units divided amongst 3 site(s)     Right :  5 units divided amongst 1 site(s)     Left :  5 units divided amongst 1 site(s)     Procerus (midline):  5 units divided amongst 1 site(s)     Right occipitalis:  20 units divided amongst 4 site(s)     Left occipitalis:  20 units divided amongst 4 site(s)     Right cervical paraspinal:  15 units divided amongst 3 site(s)     Left cervical paraspinal:  15 units divided amongst 3 site(s)     Right trapezius:  25 units divided amongst 2 site(s)     Left trapezius:  25 units divided amongst 2 site(s)     Right temporalis:  20 units divided amongst 4 site(s)     Left temporalis:  20 units divided amongst 4 site(s)         Total units injected:  200     Total units wasted:  0  Medications: 200 Units onabotulinumtoxina 200 unit    Post-procedure details:      Patient tolerance of procedure:  Tolerated well, no immediate complications  Comments: 1st botox in roughly 10 yrs.

## 2024-04-30 ENCOUNTER — HOSPITAL ENCOUNTER (OUTPATIENT)
Dept: RADIOLOGY | Facility: HOSPITAL | Age: 49
Discharge: HOME | End: 2024-04-30
Attending: INTERNAL MEDICINE
Payer: COMMERCIAL

## 2024-04-30 DIAGNOSIS — C50.919 INVASIVE LOBULAR CARCINOMA OF BREAST IN FEMALE: ICD-10-CM

## 2024-04-30 PROCEDURE — 77067 SCR MAMMO BI INCL CAD: CPT | Mod: TC,52

## 2024-04-30 PROCEDURE — 77063 BREAST TOMOSYNTHESIS BI: CPT | Mod: TC,52

## 2024-04-30 PROCEDURE — 77063 BREAST TOMOSYNTHESIS BI: CPT | Mod: 26,52,, | Performed by: RADIOLOGY

## 2024-04-30 PROCEDURE — 77067 SCR MAMMO BI INCL CAD: CPT | Mod: 26,52,, | Performed by: RADIOLOGY

## 2024-05-16 ENCOUNTER — HOSPITAL ENCOUNTER (OUTPATIENT)
Dept: RADIOLOGY | Facility: HOSPITAL | Age: 49
Discharge: HOME | End: 2024-05-16
Attending: INTERNAL MEDICINE
Payer: COMMERCIAL

## 2024-05-16 ENCOUNTER — HOSPITAL ENCOUNTER (OUTPATIENT)
Dept: RADIOLOGY | Facility: CLINIC | Age: 49
Discharge: HOME | End: 2024-05-16
Attending: INTERNAL MEDICINE
Payer: COMMERCIAL

## 2024-05-16 DIAGNOSIS — C50.212 MALIGNANT NEOPLASM OF UPPER-INNER QUADRANT OF LEFT BREAST IN FEMALE, ESTROGEN RECEPTOR POSITIVE: ICD-10-CM

## 2024-05-16 DIAGNOSIS — M85.89 OSTEOPENIA OF MULTIPLE SITES: ICD-10-CM

## 2024-05-16 DIAGNOSIS — Z17.0 MALIGNANT NEOPLASM OF UPPER-INNER QUADRANT OF LEFT BREAST IN FEMALE, ESTROGEN RECEPTOR POSITIVE: ICD-10-CM

## 2024-05-16 PROCEDURE — A9585 GADOBUTROL INJECTION: HCPCS | Performed by: INTERNAL MEDICINE

## 2024-05-16 PROCEDURE — 70553 MRI BRAIN STEM W/O & W/DYE: CPT | Mod: 26,,, | Performed by: STUDENT IN AN ORGANIZED HEALTH CARE EDUCATION/TRAINING PROGRAM

## 2024-05-16 PROCEDURE — 70553 MRI BRAIN STEM W/O & W/DYE: CPT | Mod: TC

## 2024-05-16 PROCEDURE — 25500020 PHARM REV CODE 255: Performed by: INTERNAL MEDICINE

## 2024-05-16 RX ORDER — GADOBUTROL 604.72 MG/ML
7 INJECTION INTRAVENOUS
Status: COMPLETED | OUTPATIENT
Start: 2024-05-16 | End: 2024-05-16

## 2024-05-16 RX ADMIN — GADOBUTROL 7 ML: 604.72 INJECTION INTRAVENOUS at 10:05

## 2024-05-17 ENCOUNTER — TELEPHONE (OUTPATIENT)
Dept: HEMATOLOGY/ONCOLOGY | Facility: CLINIC | Age: 49
End: 2024-05-17
Payer: COMMERCIAL

## 2024-05-17 ENCOUNTER — PATIENT MESSAGE (OUTPATIENT)
Dept: PRIMARY CARE CLINIC | Facility: CLINIC | Age: 49
End: 2024-05-17
Payer: COMMERCIAL

## 2024-05-17 ENCOUNTER — PATIENT MESSAGE (OUTPATIENT)
Dept: HEMATOLOGY/ONCOLOGY | Facility: CLINIC | Age: 49
End: 2024-05-17
Payer: COMMERCIAL

## 2024-05-17 ENCOUNTER — LAB VISIT (OUTPATIENT)
Dept: LAB | Facility: HOSPITAL | Age: 49
End: 2024-05-17
Payer: COMMERCIAL

## 2024-05-17 ENCOUNTER — INFUSION (OUTPATIENT)
Dept: INFUSION THERAPY | Facility: HOSPITAL | Age: 49
End: 2024-05-17
Payer: COMMERCIAL

## 2024-05-17 ENCOUNTER — OFFICE VISIT (OUTPATIENT)
Dept: HEMATOLOGY/ONCOLOGY | Facility: CLINIC | Age: 49
End: 2024-05-17
Payer: COMMERCIAL

## 2024-05-17 VITALS
OXYGEN SATURATION: 99 % | HEART RATE: 84 BPM | SYSTOLIC BLOOD PRESSURE: 118 MMHG | BODY MASS INDEX: 25.34 KG/M2 | WEIGHT: 152.13 LBS | HEIGHT: 65 IN | DIASTOLIC BLOOD PRESSURE: 75 MMHG | TEMPERATURE: 98 F | RESPIRATION RATE: 17 BRPM

## 2024-05-17 DIAGNOSIS — Z12.39 BREAST CANCER SCREENING, HIGH RISK PATIENT: ICD-10-CM

## 2024-05-17 DIAGNOSIS — M85.89 OSTEOPENIA OF MULTIPLE SITES: Primary | ICD-10-CM

## 2024-05-17 DIAGNOSIS — Z80.3 FAMILY HISTORY OF BREAST CANCER: ICD-10-CM

## 2024-05-17 DIAGNOSIS — Z17.0 MALIGNANT NEOPLASM OF UPPER-INNER QUADRANT OF LEFT BREAST IN FEMALE, ESTROGEN RECEPTOR POSITIVE: ICD-10-CM

## 2024-05-17 DIAGNOSIS — C77.9 SECONDARY ADENOCARCINOMA OF LYMPH NODE: ICD-10-CM

## 2024-05-17 DIAGNOSIS — Z17.0 MALIGNANT NEOPLASM OF UPPER-INNER QUADRANT OF LEFT BREAST IN FEMALE, ESTROGEN RECEPTOR POSITIVE: Primary | ICD-10-CM

## 2024-05-17 DIAGNOSIS — M85.89 OSTEOPENIA OF MULTIPLE SITES: ICD-10-CM

## 2024-05-17 DIAGNOSIS — T45.1X5A AROMATASE INHIBITOR-ASSOCIATED ARTHRALGIA: ICD-10-CM

## 2024-05-17 DIAGNOSIS — C50.212 MALIGNANT NEOPLASM OF UPPER-INNER QUADRANT OF LEFT BREAST IN FEMALE, ESTROGEN RECEPTOR POSITIVE: ICD-10-CM

## 2024-05-17 DIAGNOSIS — Z79.811 USE OF ANASTROZOLE: ICD-10-CM

## 2024-05-17 DIAGNOSIS — T45.1X5A PERIPHERAL NEUROPATHY DUE TO CHEMOTHERAPY: ICD-10-CM

## 2024-05-17 DIAGNOSIS — M25.50 AROMATASE INHIBITOR-ASSOCIATED ARTHRALGIA: ICD-10-CM

## 2024-05-17 DIAGNOSIS — E55.9 VITAMIN D DEFICIENCY: ICD-10-CM

## 2024-05-17 DIAGNOSIS — Z12.31 ENCOUNTER FOR SCREENING MAMMOGRAM FOR BREAST CANCER: ICD-10-CM

## 2024-05-17 DIAGNOSIS — G62.0 PERIPHERAL NEUROPATHY DUE TO CHEMOTHERAPY: ICD-10-CM

## 2024-05-17 DIAGNOSIS — C50.212 MALIGNANT NEOPLASM OF UPPER-INNER QUADRANT OF LEFT BREAST IN FEMALE, ESTROGEN RECEPTOR POSITIVE: Primary | ICD-10-CM

## 2024-05-17 LAB
ALBUMIN SERPL BCP-MCNC: 4.1 G/DL (ref 3.5–5.2)
ALP SERPL-CCNC: 67 U/L (ref 55–135)
ALT SERPL W/O P-5'-P-CCNC: 55 U/L (ref 10–44)
ANION GAP SERPL CALC-SCNC: 9 MMOL/L (ref 8–16)
AST SERPL-CCNC: 30 U/L (ref 10–40)
BASOPHILS # BLD AUTO: 0.03 K/UL (ref 0–0.2)
BASOPHILS NFR BLD: 0.6 % (ref 0–1.9)
BILIRUB SERPL-MCNC: 0.5 MG/DL (ref 0.1–1)
BUN SERPL-MCNC: 16 MG/DL (ref 6–20)
CALCIUM SERPL-MCNC: 9.6 MG/DL (ref 8.7–10.5)
CHLORIDE SERPL-SCNC: 107 MMOL/L (ref 95–110)
CO2 SERPL-SCNC: 23 MMOL/L (ref 23–29)
CREAT SERPL-MCNC: 0.7 MG/DL (ref 0.5–1.4)
DIFFERENTIAL METHOD BLD: NORMAL
EOSINOPHIL # BLD AUTO: 0.3 K/UL (ref 0–0.5)
EOSINOPHIL NFR BLD: 6.3 % (ref 0–8)
ERYTHROCYTE [DISTWIDTH] IN BLOOD BY AUTOMATED COUNT: 12.2 % (ref 11.5–14.5)
EST. GFR  (NO RACE VARIABLE): >60 ML/MIN/1.73 M^2
GLUCOSE SERPL-MCNC: 95 MG/DL (ref 70–110)
HCT VFR BLD AUTO: 43.1 % (ref 37–48.5)
HGB BLD-MCNC: 14.4 G/DL (ref 12–16)
IMM GRANULOCYTES # BLD AUTO: 0.01 K/UL (ref 0–0.04)
IMM GRANULOCYTES NFR BLD AUTO: 0.2 % (ref 0–0.5)
LYMPHOCYTES # BLD AUTO: 1.4 K/UL (ref 1–4.8)
LYMPHOCYTES NFR BLD: 25.8 % (ref 18–48)
MCH RBC QN AUTO: 30.4 PG (ref 27–31)
MCHC RBC AUTO-ENTMCNC: 33.4 G/DL (ref 32–36)
MCV RBC AUTO: 91 FL (ref 82–98)
MONOCYTES # BLD AUTO: 0.6 K/UL (ref 0.3–1)
MONOCYTES NFR BLD: 10.3 % (ref 4–15)
NEUTROPHILS # BLD AUTO: 3.1 K/UL (ref 1.8–7.7)
NEUTROPHILS NFR BLD: 56.8 % (ref 38–73)
NRBC BLD-RTO: 0 /100 WBC
PLATELET # BLD AUTO: 261 K/UL (ref 150–450)
PMV BLD AUTO: 9.6 FL (ref 9.2–12.9)
POTASSIUM SERPL-SCNC: 4.2 MMOL/L (ref 3.5–5.1)
PROT SERPL-MCNC: 7.2 G/DL (ref 6–8.4)
RBC # BLD AUTO: 4.73 M/UL (ref 4–5.4)
SODIUM SERPL-SCNC: 139 MMOL/L (ref 136–145)
WBC # BLD AUTO: 5.42 K/UL (ref 3.9–12.7)

## 2024-05-17 PROCEDURE — 80053 COMPREHEN METABOLIC PANEL: CPT | Performed by: NURSE PRACTITIONER

## 2024-05-17 PROCEDURE — 63600175 PHARM REV CODE 636 W HCPCS: Mod: JZ,JG | Performed by: NURSE PRACTITIONER

## 2024-05-17 PROCEDURE — 1159F MED LIST DOCD IN RCRD: CPT | Mod: CPTII,S$GLB,, | Performed by: NURSE PRACTITIONER

## 2024-05-17 PROCEDURE — 3008F BODY MASS INDEX DOCD: CPT | Mod: CPTII,S$GLB,, | Performed by: NURSE PRACTITIONER

## 2024-05-17 PROCEDURE — 99999 PR PBB SHADOW E&M-EST. PATIENT-LVL IV: CPT | Mod: PBBFAC,,, | Performed by: NURSE PRACTITIONER

## 2024-05-17 PROCEDURE — G2211 COMPLEX E/M VISIT ADD ON: HCPCS | Mod: S$GLB,,, | Performed by: NURSE PRACTITIONER

## 2024-05-17 PROCEDURE — 3078F DIAST BP <80 MM HG: CPT | Mod: CPTII,S$GLB,, | Performed by: NURSE PRACTITIONER

## 2024-05-17 PROCEDURE — 99215 OFFICE O/P EST HI 40 MIN: CPT | Mod: S$GLB,,, | Performed by: NURSE PRACTITIONER

## 2024-05-17 PROCEDURE — 3074F SYST BP LT 130 MM HG: CPT | Mod: CPTII,S$GLB,, | Performed by: NURSE PRACTITIONER

## 2024-05-17 PROCEDURE — 96372 THER/PROPH/DIAG INJ SC/IM: CPT

## 2024-05-17 PROCEDURE — 36415 COLL VENOUS BLD VENIPUNCTURE: CPT | Performed by: NURSE PRACTITIONER

## 2024-05-17 PROCEDURE — 85025 COMPLETE CBC W/AUTO DIFF WBC: CPT | Performed by: NURSE PRACTITIONER

## 2024-05-17 RX ADMIN — DENOSUMAB 60 MG: 60 INJECTION SUBCUTANEOUS at 01:05

## 2024-05-17 NOTE — Clinical Note
Hi! This will be a new patient of yours (previous Cattie). She is on AI since 2017 (planning 10 years total per Sanket). She saw me today with a repeat MRI brain. She had her initial MRI in February due to headaches. The one yesterday continues to show a questionable area. Clinically doing well- headaches better after botox. Would you simply repeat this MRI again in 3 months? Neuro recommending MRA and MRV. Thanks in advance.

## 2024-05-17 NOTE — PROGRESS NOTES
PROGRESS NOTE    Subjective:       Patient ID: Ksenia Arce is a 48 y.o. female.  MRN: [id number]  : 1975    Chief Complaint: Left Breast cancer     History of Present Illness:   Ksenia Arce is a 48 y.o. female who presents with a diagnosis of breast cancer. See Oncology history for details.      She has been on Anastrozole since the end of .  Diagnosed in 2016, s/p neoadjuvant chemotherapy.   She had a left mastectomy with reconstruction with flap reconstruction, then underwent RT.    Interim history:  Had a repeat brain MRI yesterday and is here to discuss results.    Of note, initial MRI brain done 2024 for HTN and headaches-  revealed abnormality requiring close follow up.   Repeat MRI brain 2024: see below.   She also saw neurology in the interval, who recommended MRA and MRV of the brain but this has not been scheduled.       2024 MRI brain:   FINDINGS:  Intracranial compartment:  Ventricles and sulci are stable in size without evidence of hydrocephalus. No extra-axial blood or fluid collections.  Stable punctate focus susceptibility within the right precentral gyrus with punctate FLAIR hyperintense signal and possible surrounding curvilinear enhancement, overall stable from comparison MRI 2024.  No new enhancing lesions or areas of susceptibility artifact.  No parenchymal mass, acute hemorrhage, edema or acute infarct.  Normal vascular flow voids are preserved.  Skull/extracranial contents (limited evaluation): Bone marrow signal intensity is normal.  Lobular mucosal thickening of the paranasal sinuses with a mucous retention cyst right maxillary sinus.  Aerated secretions left maxillary sinus.  Additional T2 hyperintense focus about the left nasal cavity which may represent retained secretions or a small polyp.  Mastoid air cells are essentially clear.  Impression:  Stable single punctate susceptibility focus with  associated FLAIR signal hyperintensity and questionable surrounding curvilinear enhancement.  Findings remain nonspecific, and may represent remote microhemorrhage or small venous vascular malformation, noting that a small metastatic focus cannot be completely excluded.  No new enhancing lesions identified elsewhere.  No acute intracranial pathology.  Similar paranasal sinus disease as well as T2 hyperdense focus about the left nasal cavity.  Findings may represent inspissated secretions, noting that a nasal polyp could present similarly.  Correlation is advised.  Electronically signed by:Chaparro Posey  Date:                                            05/16/2024  Time:                                           11:09         Oncology History:  per Dr. Mckeon's previous note  As per outside notes, Dr. Armstrong  [facility] upper inner quadrant of left breast [id number] stage IIIA, ER positive, NE positive, HER2 negative, dx in 2016  Neoadjuvant chemotherapy ddAC followed by weekly taxol, final path showed residual disease in breast and 6/14 LN  Treated with adjuvant radiation  Initially treated with Lupron and Arimidex, started end of 2017.  After GOPAL/BSO, Lupron was discontinued approximately 2018  This spring is 5 years of an AI. Discussed extending therapy. Considering her pathology, I think this is reasonable with follow up of bone density    Note,   MRI brain 2/2024- done for headaches and HTN  Impression:  Single punctate susceptibility with FLAIR hyperintensity and questionable ill-defined enhancement.  This is nonspecific and may represent remote microhemorrhage or venous vascular malformation.  In light of history hemorrhagic metastases although felt less likely cannot be entirely excluded..  Clinical correlation and short-term follow-up recommended  Please note there are lobular foci in the maxillary antra suggestive for mucous retention cyst with lobular focus in the left nasal cavity suggestive for possible polyp.  Clinical correlation and correlation with direct visual inspection advise  See above     FH: Paternal MONICA had breast cancer in her 70's      Genetics reportedly negative in 2016    Oncology History   Malignant neoplasm of left breast   5/2016 Initial Diagnosis    Malignant neoplasm of left breast     3/18/2022 Cancer Staged    Staging form: Breast, AJCC 8th Edition  - Clinical: cT2, cN2, cM0, ER+, AK+, HER2-         History:  Past Medical History:   Diagnosis Date    Breast cancer 05/2016    ER+ ILC    History of antineoplastic chemotherapy 2016    6/30/2016-11/16/2016    History of therapeutic radiation 2017    left chest, completed 5/10/17, dose 9500 cGy    Mental disorder     Migraine headache     Neuropathy       Past Surgical History:   Procedure Laterality Date    BREAST CAPSULECTOMY      BREAST RECONSTRUCTION      COLONOSCOPY N/A 09/17/2021    Procedure: COLONOSCOPY;  Surgeon: Diony Montes MD;  Location: [facility];  Service: Endoscopy;  Laterality: N/A;    COSMETIC SURGERY  [id number]    Reconstruction    HYSTERECTOMY  2019    MASTECTOMY Left 12/26/2016    TONSILLECTOMY      TOTAL ABDOMINAL HYSTERECTOMY W/ BILATERAL SALPINGOOPHORECTOMY  2018     Family History   Problem Relation Name Age of Onset    Hypertension Mother      Colon polyps Mother      Crohn's disease Mother      Clotting disorder Mother      Coronary artery disease Mother      Hypertension Father Father     Heart disease Father Father     Breast cancer Paternal Grandmother  72    Breast cancer Maternal Cousin Shadia 59        stage 0     Social History     Tobacco Use    Smoking status: Never     Passive exposure: Never    Smokeless tobacco: Never   Substance and Sexual Activity    Alcohol use: Yes     Alcohol/week: 2.0 standard drinks of alcohol     Types: 2 Glasses of wine per week    Drug use: Never    Sexual activity: Yes     Partners: Male     Birth control/protection: Post-menopausal        ROS:   Review of Systems   Constitutional:   "Negative for fever, malaise/fatigue and weight loss.   HENT:  Negative for congestion, hearing loss, nosebleeds and sore throat.    Eyes:  Negative for double vision and photophobia.   Respiratory:  Negative for cough, hemoptysis, sputum production, shortness of breath and wheezing.    Cardiovascular:  Negative for chest pain, palpitations, orthopnea and leg swelling.   Gastrointestinal:  Negative for abdominal pain, blood in stool, constipation, diarrhea, heartburn, nausea and vomiting.   Genitourinary:  Negative for dysuria, hematuria and urgency.   Musculoskeletal:  Positive for joint pain (from AI; takes gabapentin with some relief). Negative for back pain and myalgias.   Skin:  Negative for itching and rash.   Neurological:  Positive for sensory change and headaches (better overall after botox). Negative for dizziness, tingling, seizures and weakness.   Endo/Heme/Allergies:  Negative for polydipsia. Does not bruise/bleed easily.   Psychiatric/Behavioral:  Negative for depression and memory loss. The patient is nervous/anxious. The patient does not have insomnia.         Objective:     Vitals:    05/17/24 1240   BP: 118/75   Pulse: 84   Resp: 17   Temp: 98 °F (36.7 °C)   TempSrc: Oral   SpO2: 99%   Weight: 69 kg (152 lb 1.9 oz)   Height: 5' 5" (1.651 m)   PainSc: 0-No pain           Wt Readings from Last 10 Encounters:   05/17/24 69 kg (152 lb 1.9 oz)   04/09/24 67 kg (147 lb 11.3 oz)   03/06/24 68.5 kg (150 lb 14.5 oz)   02/16/24 69 kg (152 lb 1.9 oz)   01/26/24 68.2 kg (150 lb 5.7 oz)   12/01/23 68.5 kg (151 lb 0.2 oz)   11/20/23 68.6 kg (151 lb 3.1 oz)   10/06/23 67.4 kg (148 lb 7.7 oz)   09/11/23 68.6 kg (151 lb 3.8 oz)   09/05/23 68 kg (150 lb)       Physical Examination:   Physical Exam  Vitals and nursing note reviewed.   Constitutional:       General: She is not in acute distress.     Appearance: She is not diaphoretic.   HENT:      Head: Normocephalic.      Mouth/Throat:      Pharynx: No oropharyngeal " exudate.   Eyes:      General: No scleral icterus.     Conjunctiva/sclera: Conjunctivae normal.   Neck:      Thyroid: No thyromegaly.   Cardiovascular:      Rate and Rhythm: Normal rate and regular rhythm.      Heart sounds: Normal heart sounds. No murmur heard.  Pulmonary:      Effort: Pulmonary effort is normal. No respiratory distress.      Breath sounds: No stridor. No wheezing or rales.      Comments: Right breast without masses or LAD  Left breast mastectomy with reconstruction. No masses or LAD.   Chest:      Chest wall: No tenderness.   Abdominal:      General: Bowel sounds are normal. There is no distension.      Palpations: Abdomen is soft. There is no mass.      Tenderness: There is no abdominal tenderness. There is no rebound.   Musculoskeletal:         General: No tenderness or deformity. Normal range of motion.      Cervical back: Neck supple.   Lymphadenopathy:      Cervical: No cervical adenopathy.   Skin:     General: Skin is warm and dry.      Findings: No erythema or rash.   Neurological:      Mental Status: She is alert and oriented to person, place, and time.      Cranial Nerves: No cranial nerve deficit.      Coordination: Coordination normal.      Gait: Gait is intact.   Psychiatric:         Mood and Affect: Affect normal.         Cognition and Memory: Memory normal.         Judgment: Judgment normal.          Diagnostic Tests:  Significant Imaging: I have reviewed and interpreted all pertinent imaging results/findings.    Laboratory Data:  All pertinent labs have been reviewed.  Labs:   Lab Results   Component Value Date    WBC 5.42 05/17/2024    RBC 4.73 05/17/2024    HGB 14.4 05/17/2024    HCT 43.1 05/17/2024    MCV 91 05/17/2024     05/17/2024    GLU 95 05/17/2024     05/17/2024    K 4.2 05/17/2024    BUN 16 05/17/2024    CREATININE 0.7 05/17/2024    AST 30 05/17/2024    ALT 55 (H) 05/17/2024    BILITOT 0.5 05/17/2024       Assessment/Plan:     1. Malignant neoplasm of  upper-inner quadrant of left breast in female, estrogen receptor positive  MRI Breast w/wo Contrast, w/CAD, Bilateral    Acupuncture    CBC Oncology    Comprehensive Metabolic Panel      2. Secondary adenocarcinoma of lymph node  MRI Breast w/wo Contrast, w/CAD, Bilateral      3. Use of anastrozole  MRI Breast w/wo Contrast, w/CAD, Bilateral      4. Osteopenia of multiple sites        5. Family history of breast cancer  MRI Breast w/wo Contrast, w/CAD, Bilateral      6. Peripheral neuropathy due to chemotherapy  Acupuncture      7. Breast cancer screening, high risk patient  MRI Breast w/wo Contrast, w/CAD, Bilateral    Mammo Digital Screening Right with [facility]      8. Encounter for screening mammogram for breast cancer  Mammo Digital Screening Right with [facility]      9. Aromatase inhibitor-associated arthralgia  Acupuncture      10. Vitamin D deficiency  Vitamin D            Malignant neoplasm of upper-inner quadrant of left female breast, unspecified estrogen receptor status  Secondary adenocarcinoma of lymph node  Use of anastrozole    ILC upper inner quadrant of left breast [id number] stage IIIA, ER positive, WI positive, HER2 negative, dx in 2016     Continue anastrozole, recommend 10 years of treatment as tolerated given node positive disease. Continue supportive care as discussed below.     Continue active surveillance.Right Mammogram normal  Alternated every 6 months MRI and mammogram.  MRI breast due 10/2024  R MMG due 4/2025    Given multiple new symptoms, staging work up was done. CT and bone scan show no evidence of metastasis.     Non specific changes on brain MRI noted 2/2024. Her repeat MRI yesterday was reviewed. Will discuss with Dr. Mota and likely repeat in 3 months.   Meanwhile, we will scheduled her MRA and MRV as per Neurologist recs.     AI related Arthralgias   Stable  She reports gabapentin helps some  We discussed tumeric   Acupuncture order placed.     Osteopenia of multiple sites  Continue  Prolia q 6 months. Due today  Next Dexa scan is due - she will reschedule as could not do yesterday  Needs Vit D level with next labs  Calcium levels normal    Peripheral neuropathy due to chemotherapy  Continue gabapentin.   Acupuncture order placed    Anxiety   On Fluoxetine and lorazepam.   Reassurance given    HTN  Continue antihypertenisves             Route Chart for Scheduling    Med Onc Chart Routing  Urgent    Follow up with physician 3 months. Dr. Mota please with cbc, cmp, vit d   Follow up with [facility]    Infusion scheduling note    Injection scheduling note prolia 6 months   Labs CBC, CMP and vitamin D   Scheduling:  Preferred lab:  Lab interval:     Imaging   MRI breast 10/2024; R MMG 4/2025   Pharmacy appointment    Other referrals       Additional referrals needed  acupuncture; please ask neurology to schedule her MRA and MRV as not done; needs f/u with genetics               Therapy Plan Information  Medications  denosumab (PROLIA) injection 60 mg  60 mg, Subcutaneous, Every 26 weeks      Patient is in agreement with the proposed treatment plan. All questions were answered to the patient's satisfaction. Pt knows to call clinic for any new or worsening symptoms and if anything is needed before the next clinic visit.    Katherine Scott, MSN, APRN, FNP-C  Nurse Practitioner to Dr. Kathy Warner  [facility]  [facility]  Hematology & Medical Oncology  [address] [place] [id number]  ph. [phone] ext [phone]  Fax. [phone]              46 minutes of total time spent on the encounter, which includes face to face time and non-face to face time preparing to see the patient (eg, review of tests), Obtaining and/or reviewing separately obtained history, Documenting clinical information in the electronic or other health record, Independently interpreting results (not separately reported) and communicating results to the patient/family/caregiver, or Care  coordination (not separately reported).

## 2024-05-17 NOTE — NURSING
Ca++ 9.6.  Has not been taking Ca++ and vitamin D supplement at home but states she spoke with MD about it.  Denies jaw pain or invasive dental procedures.  Prolia administered to abdominal tissue.  Pt tolerated.

## 2024-05-17 NOTE — TELEPHONE ENCOUNTER
No care due was identified.  Health system Embedded Care Due Messages. Reference number: [id number].   5/17/2024 3:11:51 PM CDT

## 2024-05-17 NOTE — Clinical Note
Hi! Patient wants to know if you are recommending updated testing? She was unable to find her previous results. Can you touch base with her please. Thanks, PJ

## 2024-05-20 RX ORDER — SCOLOPAMINE TRANSDERMAL SYSTEM 1 MG/1
1 PATCH, EXTENDED RELEASE TRANSDERMAL
Qty: 7 PATCH | Refills: 1 | Status: SHIPPED | OUTPATIENT
Start: 2024-05-20

## 2024-05-27 ENCOUNTER — HOSPITAL ENCOUNTER (OUTPATIENT)
Dept: RADIOLOGY | Facility: HOSPITAL | Age: 49
Discharge: HOME | End: 2024-05-27
Attending: INTERNAL MEDICINE
Payer: COMMERCIAL

## 2024-05-27 PROCEDURE — 77080 DXA BONE DENSITY AXIAL: CPT | Mod: 26,,, | Performed by: RADIOLOGY

## 2024-05-27 PROCEDURE — 77080 DXA BONE DENSITY AXIAL: CPT | Mod: TC

## 2024-05-28 ENCOUNTER — TELEPHONE (OUTPATIENT)
Dept: HEMATOLOGY/ONCOLOGY | Facility: CLINIC | Age: 49
End: 2024-05-28
Payer: COMMERCIAL

## 2024-05-28 ENCOUNTER — TELEPHONE (OUTPATIENT)
Dept: NEUROLOGY | Facility: CLINIC | Age: 49
End: 2024-05-28
Payer: COMMERCIAL

## 2024-05-28 DIAGNOSIS — J33.9 NASAL POLYP: Primary | ICD-10-CM

## 2024-05-28 NOTE — TELEPHONE ENCOUNTER
Called and spoke to patient and schedule both MRI and MRA for 6/12/2024 at 3:15pm. Pt voiced understanding.

## 2024-05-28 NOTE — TELEPHONE ENCOUNTER
----- Message from Katherine Scott NP sent at 5/28/2024 10:16 AM CDT -----  Hi! Patient would like to get her MRA and MRV scheduled. Can someone assist her please. Thank you

## 2024-05-28 NOTE — TELEPHONE ENCOUNTER
Spoke to patient in follow up of MRI brain. I discussed case with Dr. Warner. She recommends proceeding with neuro recommendations - MRA and MRV. I will also refer to ENT for nasal polyp.   Patient verbalized understanding.   PJ

## 2024-05-29 ENCOUNTER — LAB VISIT (OUTPATIENT)
Dept: LAB | Facility: HOSPITAL | Age: 49
End: 2024-05-29
Attending: OTOLARYNGOLOGY
Payer: COMMERCIAL

## 2024-05-29 ENCOUNTER — OFFICE VISIT (OUTPATIENT)
Dept: OTOLARYNGOLOGY | Facility: CLINIC | Age: 49
End: 2024-05-29
Payer: COMMERCIAL

## 2024-05-29 VITALS
BODY MASS INDEX: 25.48 KG/M2 | DIASTOLIC BLOOD PRESSURE: 79 MMHG | HEART RATE: 83 BPM | SYSTOLIC BLOOD PRESSURE: 116 MMHG | WEIGHT: 153.13 LBS

## 2024-05-29 DIAGNOSIS — J30.9 CHRONIC ALLERGIC RHINITIS: ICD-10-CM

## 2024-05-29 DIAGNOSIS — J32.8 OTHER CHRONIC SINUSITIS: Primary | ICD-10-CM

## 2024-05-29 DIAGNOSIS — J33.9 NASAL POLYP: ICD-10-CM

## 2024-05-29 DIAGNOSIS — J33.8 POLYPOSIS OF SINONASAL REGION: ICD-10-CM

## 2024-05-29 PROCEDURE — 99214 OFFICE O/P EST MOD 30 MIN: CPT | Mod: 25,S$GLB,, | Performed by: OTOLARYNGOLOGY

## 2024-05-29 PROCEDURE — 1160F RVW MEDS BY RX/DR IN RCRD: CPT | Mod: CPTII,S$GLB,, | Performed by: OTOLARYNGOLOGY

## 2024-05-29 PROCEDURE — 99999 PR PBB SHADOW E&M-EST. PATIENT-LVL IV: CPT | Mod: PBBFAC,,, | Performed by: OTOLARYNGOLOGY

## 2024-05-29 PROCEDURE — 3008F BODY MASS INDEX DOCD: CPT | Mod: CPTII,S$GLB,, | Performed by: OTOLARYNGOLOGY

## 2024-05-29 PROCEDURE — 3074F SYST BP LT 130 MM HG: CPT | Mod: CPTII,S$GLB,, | Performed by: OTOLARYNGOLOGY

## 2024-05-29 PROCEDURE — 82785 ASSAY OF IGE: CPT | Performed by: OTOLARYNGOLOGY

## 2024-05-29 PROCEDURE — 86003 ALLG SPEC IGE CRUDE XTRC EA: CPT | Mod: 59 | Performed by: OTOLARYNGOLOGY

## 2024-05-29 PROCEDURE — 31231 NASAL ENDOSCOPY DX: CPT | Mod: S$GLB,,, | Performed by: OTOLARYNGOLOGY

## 2024-05-29 PROCEDURE — 86003 ALLG SPEC IGE CRUDE XTRC EA: CPT | Performed by: OTOLARYNGOLOGY

## 2024-05-29 PROCEDURE — 3078F DIAST BP <80 MM HG: CPT | Mod: CPTII,S$GLB,, | Performed by: OTOLARYNGOLOGY

## 2024-05-29 PROCEDURE — 1159F MED LIST DOCD IN RCRD: CPT | Mod: CPTII,S$GLB,, | Performed by: OTOLARYNGOLOGY

## 2024-05-29 RX ORDER — LEVOCETIRIZINE DIHYDROCHLORIDE 5 MG/1
5 TABLET, FILM COATED ORAL NIGHTLY
Qty: 30 TABLET | Refills: 11 | Status: SHIPPED | OUTPATIENT
Start: 2024-05-29 | End: 2025-05-29

## 2024-05-29 RX ORDER — AMOXICILLIN AND CLAVULANATE POTASSIUM 875; 125 MG/1; MG/1
1 TABLET, FILM COATED ORAL 2 TIMES DAILY
Qty: 42 TABLET | Refills: 0 | Status: SHIPPED | OUTPATIENT
Start: 2024-05-29 | End: 2024-06-20

## 2024-05-29 RX ORDER — BECLOMETHASONE DIPROPIONATE 80 UG/1
160 AEROSOL, METERED NASAL DAILY
Qty: 10.6 G | Refills: 6 | Status: SHIPPED | OUTPATIENT
Start: 2024-05-29

## 2024-05-29 RX ORDER — METHYLPREDNISOLONE 4 MG/1
TABLET ORAL
Qty: 21 EACH | Refills: 0 | Status: SHIPPED | OUTPATIENT
Start: 2024-05-29

## 2024-05-29 RX ORDER — AZELASTINE 1 MG/ML
1 SPRAY, METERED NASAL 2 TIMES DAILY
Qty: 30 ML | Refills: 3 | Status: SHIPPED | OUTPATIENT
Start: 2024-05-29 | End: 2025-05-29

## 2024-05-29 NOTE — PROCEDURES
Nasal/sinus endoscopy    Date/Time: 5/29/2024 2:20 PM    Performed by: Susy Mckeon MD  Authorized by: Susy Mckeon MD    Consent Done?:  Yes (Verbal)  Anesthesia:     Local anesthetic:  Lidocaine 2% and Yovani-Synephrine 1/2%  Nose:     Procedure Performed:  Nasal Endoscopy  External:      No external nasal deformity  Intranasal:      Mucosa polyps     Mucosa ulcers not present     No mucosa lesions present     Turbinates not enlarged     No septum gross deformity  Nasopharynx:      Posterior choanae patent     Eustachian tube patent     Bilateral middle turbinate edema with narrowing of the middle meatus; + narrowing of sphenoethmoid recess; +polyp left middle meatus

## 2024-05-29 NOTE — PROGRESS NOTES
Chief Complaint   Patient presents with    Nasal Congestion     Trouble breathing through nose    Allergies     Sneezing, itchy watery eyes   .    HPI:     Ksenia Arce is a 48 y.o. female who presents for evaluation of concern for nasal polyp noted on MRI brain in work up of left  breast cancer. She reports that she has symptoms of nasal congestion, nasal blockage, and posterior nasal discharge, decreased sense of smell. She describes difficulty breathing at night. There is bilateral nasal obstruction but left side is worse. She does not use sinus rinses or nasal sprays. She admits to midface pain and pressure..  She has not had sinus or nasal surgery. There is no history of sinonasal trauma. She does have history of allergic rhinitis and was on immunotherapy 20 years ago.           Past Medical History:   Diagnosis Date    Breast cancer 05/2016    ER+ ILC    History of antineoplastic chemotherapy 2016    6/30/2016-11/16/2016    History of therapeutic radiation 2017    left chest, completed 5/10/17, dose 9500 cGy    Mental disorder     Migraine headache     Neuropathy      Social History     Socioeconomic History    Marital status:    Tobacco Use    Smoking status: Never     Passive exposure: Never    Smokeless tobacco: Never   Substance and Sexual Activity    Alcohol use: Yes     Alcohol/week: 2.0 standard drinks of alcohol     Types: 2 Glasses of wine per week    Drug use: Never    Sexual activity: Yes     Partners: Male     Birth control/protection: Post-menopausal     Social Determinants of Health     Stress: Stress Concern Present (1/23/2020)    [employer] - Occupational Stress Questionnaire     Feeling of Stress : To some extent     Past Surgical History:   Procedure Laterality Date    BREAST CAPSULECTOMY      BREAST RECONSTRUCTION      COLONOSCOPY N/A 09/17/2021    Procedure: COLONOSCOPY;  Surgeon: Diony Montes MD;  [facility];  Service: Endoscopy;   Laterality: N/A;    COSMETIC SURGERY  [id number]    Reconstruction    HYSTERECTOMY  2019    MASTECTOMY Left 12/26/2016    TONSILLECTOMY      TOTAL ABDOMINAL HYSTERECTOMY W/ BILATERAL SALPINGOOPHORECTOMY  2018     Family History   Problem Relation Name Age of Onset    Hypertension Mother      Colon polyps Mother      Crohn's disease Mother      Clotting disorder Mother      Coronary artery disease Mother      Hypertension Father Father     Heart disease Father Father     Breast cancer Paternal Grandmother  72    Breast cancer Maternal Cousin Shadia 59        stage 0           Review of Systems  General: negative for chills, fever or weight loss  Psychological: negative for mood changes or depression  Ophthalmic: negative for blurry vision, photophobia or eye pain  ENT: see HPI  Respiratory: no cough, shortness of breath, or wheezing  Cardiovascular: no chest pain or dyspnea on exertion  Gastrointestinal: no abdominal pain, change in bowel habits, or black/ bloody stools  Musculoskeletal: negative for gait disturbance or muscular weakness  Neurological: no syncope or seizures; no ataxia  Dermatological: negative for puritis,  rash and jaundice  Hematologic/lymphatic: no easy bruising, no new lumps or bumps      Physical Exam:    Vitals:    05/29/24 1412   BP: 116/79   Pulse: 83       Constitutional: Well appearing / communicating without difficutly.  NAD.  Eyes: EOM I Bilaterally  Head/Face: Normocephalic.  Negative paranasal sinus pressure/tenderness.  Salivary glands WNL.  House Brackmann I Bilaterally.    Right Ear: Auricle normal appearance. External Auditory Canal within normal limits,TM w/o masses/lesions/perforations. TM mobility noted.   Left Ear: Auricle normal appearance. External Auditory Canal WNL,TM w/o masses/lesions/perforations. TM mobility noted.  Nose: No gross nasal septal deviation. Inferior Turbinates 3+ bilaterally. No septal perforation. No masses/lesions. External nasal skin appears normal  without masses/lesions.  Oral Cavity: Gingiva/lips within normal limits.  Dentition/gingiva healthy appearing. Mucus membranes moist. Floor of mouth soft, no masses palpated. Oral Tongue mobile. Hard Palate appears normal.    Oropharynx: Base of tongue appears normal. No masses/lesions noted. Tonsillar fossa/pharyngeal wall without lesions. Posterior oropharynx WNL.  Soft palate without masses. Midline uvula.   Neck/Lymphatic: No LAD I-VI bilaterally.  No thyromegaly.  No masses noted on exam.        Diagnostic studies:   MRI brain w/ w/o contrast 5/16/2024:  Impression:     Stable single punctate susceptibility focus with associated FLAIR signal hyperintensity and questionable surrounding curvilinear enhancement.  Findings remain nonspecific, and may represent remote microhemorrhage or small venous vascular malformation, noting that a small metastatic focus cannot be completely excluded.  No new enhancing lesions identified elsewhere.     No acute intracranial pathology.     Similar paranasal sinus disease as well as T2 hyperdense focus about the left nasal cavity.  Findings may represent inspissated secretions, noting that a nasal polyp could present similarly.  Correlation is advised.    MRI brain w/ w/o contrast 2/7/2024:  Impression:     Single punctate susceptibility with FLAIR hyperintensity and questionable ill-defined enhancement.  This is nonspecific and may represent remote microhemorrhage or venous vascular malformation.  In light of history hemorrhagic metastases although felt less likely cannot be entirely excluded..  Clinical correlation and short-term follow-up recommended     Please note there are lobular foci in the maxillary antra suggestive for mucous retention cyst with lobular focus in the left nasal cavity suggestive for possible polyp.  Clinical correlation and correlation with direct visual inspection advised         Assessment:    ICD-10-CM ICD-9-CM    1. Other chronic sinusitis  J32.8  473.8       2. Polyposis of sinonasal region  J33.8 471.8       3. Nasal polyp  J33.9 471.9 Ambulatory referral/consult to ENT        The primary encounter diagnosis was Other chronic sinusitis. Diagnoses of Polyposis of sinonasal region and Nasal polyp were also pertinent to this visit.      Plan:  No orders of the defined types were placed in this encounter.    Recommend Ciro Med Sinus Rinse BID; distilled water only(maintenance).  Start Qnasl 2 sprays per nostril daily (spray laterally).  Start Astelin 1 spray per nostril BID  Start xyzal 5mg PO daily  Start Augmentin 875mg PO BID for 21 days  Start medrol dose eda  Obtain immunoCAP testing  Obtain CT scan of the sinuses to further assess    Susy Tsai MD

## 2024-05-30 ENCOUNTER — PATIENT MESSAGE (OUTPATIENT)
Dept: OTOLARYNGOLOGY | Facility: CLINIC | Age: 49
End: 2024-05-30
Payer: COMMERCIAL

## 2024-05-30 DIAGNOSIS — J33.9 NASAL POLYP: Primary | ICD-10-CM

## 2024-05-30 LAB — IGE SERPL-ACNC: 74 IU/ML (ref 0–100)

## 2024-05-30 NOTE — TELEPHONE ENCOUNTER
I am sorry to hear she is experiencing these symptoms. Please advise that this is not typical of the endoscopy. Please continue with the medications prescribed and she may use nasal saline rinses 2-3 times per day as well.

## 2024-05-31 LAB — AMER SYCAMORE IGE QN: <0.1 KU/L

## 2024-06-04 LAB
A ALTERNATA IGE QN: 0.6 KU/L
A FUMIGATUS IGE QN: <0.1 KU/L
ALLERGEN BOXELDER MAPLE TREE IGE: <0.1 KU/L
ALLERGEN MAPLE (BOX ELDER) CLASS: NORMAL
ALLERGEN MULBERRY CLASS: NORMAL
ALLERGEN MULBERRY TREE IGE: <0.1 KU/L
ALLERGEN PIGWEED IGE: <0.1 KU/L
BALD CYPRESS IGE QN: <0.1 KU/L
BERMUDA GRASS IGE QN: <0.1 KU/L
C HERBARUM IGE QN: <0.1 KU/L
CAT DANDER IGE QN: 1.62 KU/L
CEDAR IGE QN: <0.1 KU/L
COMMON PIGWEED CLASS: NORMAL
COMMON RAGWEED IGE QN: <0.1 KU/L
D FARINAE IGE QN: <0.1 KU/L
D PTERONYSS IGE QN: <0.1 KU/L
DEPRECATED A ALTERNATA IGE RAST QL: ABNORMAL
DEPRECATED A FUMIGATUS IGE RAST QL: NORMAL
DEPRECATED BALD CYPRESS IGE RAST QL: NORMAL
DEPRECATED BERMUDA GRASS IGE RAST QL: NORMAL
DEPRECATED C HERBARUM IGE RAST QL: NORMAL
DEPRECATED CAT DANDER IGE RAST QL: ABNORMAL
DEPRECATED CEDAR IGE RAST QL: NORMAL
DEPRECATED COMMON RAGWEED IGE RAST QL: NORMAL
DEPRECATED D FARINAE IGE RAST QL: NORMAL
DEPRECATED D PTERONYSS IGE RAST QL: NORMAL
DEPRECATED DOG DANDER IGE RAST QL: ABNORMAL
DEPRECATED ELDER IGE RAST QL: NORMAL
DEPRECATED ENGL PLANTAIN IGE RAST QL: NORMAL
DEPRECATED JOHNSON GRASS IGE RAST QL: NORMAL
DEPRECATED KENT BLUE GRASS IGE RAST QL: ABNORMAL
DEPRECATED M RACEMOSUS IGE RAST QL: NORMAL
DEPRECATED NETTLE IGE RAST QL: NORMAL
DEPRECATED P NOTATUM IGE RAST QL: NORMAL
DEPRECATED PECAN/HICK TREE IGE RAST QL: NORMAL
DEPRECATED ROACH IGE RAST QL: NORMAL
DEPRECATED SILVER BIRCH IGE RAST QL: NORMAL
DEPRECATED TIMOTHY IGE RAST QL: ABNORMAL
DEPRECATED WHITE OAK IGE RAST QL: NORMAL
DOG DANDER IGE QN: 7.42 KU/L
ELDER IGE QN: <0.1 KU/L
ELM CEDAR CLASS: NORMAL
ELM CEDAR, IGE: <0.1 KU/L
ENGL PLANTAIN IGE QN: <0.1 KU/L
JOHNSON GRASS IGE QN: 0.1 KU/L
KENT BLUE GRASS IGE QN: 0.33 KU/L
M RACEMOSUS IGE QN: <0.1 KU/L
NETTLE IGE QN: <0.1 KU/L
P NOTATUM IGE QN: <0.1 KU/L
PECAN/HICK TREE IGE QN: <0.1 KU/L
ROACH IGE QN: <0.1 KU/L
SILVER BIRCH IGE QN: <0.1 KU/L
TIMOTHY IGE QN: 0.14 KU/L
WHITE OAK IGE QN: <0.1 KU/L

## 2024-06-12 ENCOUNTER — HOSPITAL ENCOUNTER (OUTPATIENT)
Dept: RADIOLOGY | Facility: HOSPITAL | Age: 49
Discharge: HOME | End: 2024-06-12
Attending: PSYCHIATRY & NEUROLOGY
Payer: COMMERCIAL

## 2024-06-12 DIAGNOSIS — R90.89 ABNORMAL FINDING ON MRI OF BRAIN: ICD-10-CM

## 2024-06-12 DIAGNOSIS — Q28.2 ARTERIOVENOUS MALFORMATION OF CEREBRAL VESSELS: ICD-10-CM

## 2024-06-12 PROCEDURE — 25500020 PHARM REV CODE 255: Performed by: PSYCHIATRY & NEUROLOGY

## 2024-06-12 PROCEDURE — A9585 GADOBUTROL INJECTION: HCPCS | Performed by: PSYCHIATRY & NEUROLOGY

## 2024-06-12 PROCEDURE — 70546 MR ANGIOGRAPH HEAD W/O&W/DYE: CPT | Mod: 26,,, | Performed by: RADIOLOGY

## 2024-06-12 PROCEDURE — 70544 MR ANGIOGRAPHY HEAD W/O DYE: CPT | Mod: 26,,, | Performed by: RADIOLOGY

## 2024-06-12 PROCEDURE — 70544 MR ANGIOGRAPHY HEAD W/O DYE: CPT | Mod: TC

## 2024-06-12 PROCEDURE — 70546 MR ANGIOGRAPH HEAD W/O&W/DYE: CPT | Mod: TC

## 2024-06-12 RX ORDER — GADOBUTROL 604.72 MG/ML
7 INJECTION INTRAVENOUS
Status: COMPLETED | OUTPATIENT
Start: 2024-06-12 | End: 2024-06-12

## 2024-06-12 RX ADMIN — GADOBUTROL 7 ML: 604.72 INJECTION INTRAVENOUS at 04:06

## 2024-06-14 ENCOUNTER — CLINICAL SUPPORT (OUTPATIENT)
Dept: REHABILITATION | Facility: HOSPITAL | Age: 49
End: 2024-06-14
Payer: COMMERCIAL

## 2024-06-14 DIAGNOSIS — M25.50 AROMATASE INHIBITOR-ASSOCIATED ARTHRALGIA: ICD-10-CM

## 2024-06-14 DIAGNOSIS — G62.0 PERIPHERAL NEUROPATHY DUE TO CHEMOTHERAPY: ICD-10-CM

## 2024-06-14 DIAGNOSIS — T45.1X5A AROMATASE INHIBITOR-ASSOCIATED ARTHRALGIA: ICD-10-CM

## 2024-06-14 DIAGNOSIS — Z17.0 MALIGNANT NEOPLASM OF UPPER-INNER QUADRANT OF LEFT BREAST IN FEMALE, ESTROGEN RECEPTOR POSITIVE: ICD-10-CM

## 2024-06-14 DIAGNOSIS — C50.212 MALIGNANT NEOPLASM OF UPPER-INNER QUADRANT OF LEFT BREAST IN FEMALE, ESTROGEN RECEPTOR POSITIVE: ICD-10-CM

## 2024-06-14 DIAGNOSIS — T45.1X5A PERIPHERAL NEUROPATHY DUE TO CHEMOTHERAPY: ICD-10-CM

## 2024-06-14 PROCEDURE — 97813 ACUP 1/> W/ESTIM 1ST 15 MIN: CPT | Performed by: ACUPUNCTURIST

## 2024-06-14 PROCEDURE — 97814 ACUP 1/> W/ESTIM EA ADDL 15: CPT | Performed by: ACUPUNCTURIST

## 2024-06-14 NOTE — PROGRESS NOTES
Acupuncture Evaluation Note     Name: Ksenia Arce  [facility] Number: [id number]    Traditional Chinese Medicine (TCM) Diagnosis: Qi Stagnation and Blood Stasis  Medical Diagnosis:   Encounter Diagnoses   Name Primary?    Malignant neoplasm of upper-inner quadrant of left breast in female, estrogen receptor positive     Peripheral neuropathy due to chemotherapy     Aromatase inhibitor-associated arthralgia         Evaluation Date: 6/14/2024    Visit #/Visits authorized: 12, 1/12    Precautions: Standard    Subjective     Chief Concern: joint pain in legs and into the knees - and sometimes down into the ankles    Medical necessity is demonstrated by the following IMPAIRMENTS: Medical Necessity: Decreased mobility limits day to day activities, social, and emergent situations and Decreased quality of life              Aggravating Factors:   variable    Relieving Factors:  heat and ice    Symptom Description:     Quality:  Aching and Dull  Severity:  3  Frequency:  continuously    Previous Treatments Tried:  Medication and massage    HEENT:  nasal polyp,  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    Chest:  scar tissue in chest    Digestion:     Diet: well balanced   Fluids: coffee 1x /day, is drinking plenty of fluids, social drinker  Taste/Appetite: 3 meals a day with snacks   Symptoms: none    Sleep: sleeps well, gets 6-7 hours a night    Energy Levels:  good    Psychological Symptoms:  on prozac since diagnosis - hx of anxiety    Other Symptoms: none     GYN Symptoms: no period / AI use for estrogen suppression     Objective     Observation: n/a    Tongue:      Body:  normal   Color:   pale   Coating:  thick, white,, and moist    Pulse:        wiry and rapid       New Findings:  none    Treatment     Treatment Principles:  move qi and blood    Acupuncture points used:  4 MAST, Du20, Gb34, Ki3, Li11, Sp10, Sp6, Sp9, St36, and YIN GRIMES  + sagar upper leg, heding, xiyan  Needles In: 22  Needles Out: 22  [place] W/ STIM placed: 1:30  PM  [place] W/ STIM removed: 2:00 PM      Other Traditional Chinese Medicine Modalities -  heat lamp    Assessment     After treatment, patient felt relaxed with treatment. Monitor symptoms      Patient prognosis is Good.     Patient will continue to benefit from acupuncture treatment to address the deficits listed in the problem list box on initial evaluation, provide patient family education and to maximize pt's level of independence in the home and community environment.     Patient's spiritual, cultural and educational needs considered and pt agreeable to plan of care and goals. Discussed anti-inflammatory diet - tumeric, garlic, cut out dairy as needed.     Anticipated barriers to treatment: none    Plan     Recommend 1 /week for 4 sessions then re-assess.      Education:  Patient is aware of cumulative benefit of acupuncture

## 2024-06-28 ENCOUNTER — CLINICAL SUPPORT (OUTPATIENT)
Dept: REHABILITATION | Facility: HOSPITAL | Age: 49
End: 2024-06-28
Payer: COMMERCIAL

## 2024-06-28 DIAGNOSIS — T45.1X5A AROMATASE INHIBITOR-ASSOCIATED ARTHRALGIA: Primary | ICD-10-CM

## 2024-06-28 DIAGNOSIS — M25.50 AROMATASE INHIBITOR-ASSOCIATED ARTHRALGIA: Primary | ICD-10-CM

## 2024-06-28 PROCEDURE — 97814 ACUP 1/> W/ESTIM EA ADDL 15: CPT | Performed by: ACUPUNCTURIST

## 2024-06-28 PROCEDURE — 97813 ACUP 1/> W/ESTIM 1ST 15 MIN: CPT | Performed by: ACUPUNCTURIST

## 2024-06-28 NOTE — PROGRESS NOTES
Acupuncture Evaluation Note     Name: Ksenia Arce  [facility] Number: [id number]    Traditional Chinese Medicine (TCM) Diagnosis: Qi Stagnation and Blood Stasis  Medical Diagnosis:   Encounter Diagnosis   Name Primary?    Aromatase inhibitor-associated arthralgia Yes          Evaluation Date: 6/28/2024    Visit #/Visits authorized: 12, 3/12    Precautions: Standard    Subjective     Chief Concern: joint pain in legs and into the knees - and sometimes down into the ankles    Medical necessity is demonstrated by the following IMPAIRMENTS: Medical Necessity: Decreased mobility limits day to day activities, social, and emergent situations and Decreased quality of life              Aggravating Factors:   variable    Relieving Factors:  heat and ice    Symptom Description:     Quality:  Aching and Dull  Severity:  3  Frequency:  continuously      Treatment     Treatment Principles:  move qi and blood    Acupuncture points used:  4 MAST, Du20, Gb34, Ki3, Li11, Sp10, Sp6, Sp9, St36, and YIN GRIMES  + sagar upper leg, heding, xiyan  Needles In: 22  Needles Out: 22  [place] W/ STIM placed: 3:10 PM  Needles W/ STIM removed: 3:40 PM      Other Traditional Chinese Medicine Modalities -  heat lamp    Assessment     After treatment, patient feels relief with visits, continue with care as needed    Patient prognosis is Good.     Patient will continue to benefit from acupuncture treatment to address the deficits listed in the problem list box on initial evaluation, provide patient family education and to maximize pt's level of independence in the home and community environment.     Patient's spiritual, cultural and educational needs considered and pt agreeable to plan of care and goals. Discussed anti-inflammatory diet - tumeric, garlic, cut out dairy as needed.     Anticipated barriers to treatment: none    Plan     Recommend 1 /week for 3 sessions then re-assess.      Education:  Patient is aware of cumulative benefit of  acupuncture

## 2024-07-03 ENCOUNTER — PROCEDURE VISIT (OUTPATIENT)
Dept: NEUROLOGY | Facility: CLINIC | Age: 49
End: 2024-07-03
Payer: COMMERCIAL

## 2024-07-03 ENCOUNTER — HOSPITAL ENCOUNTER (OUTPATIENT)
Dept: RADIOLOGY | Facility: HOSPITAL | Age: 49
Discharge: HOME | End: 2024-07-03
Attending: OTOLARYNGOLOGY
Payer: COMMERCIAL

## 2024-07-03 VITALS — DIASTOLIC BLOOD PRESSURE: 82 MMHG | SYSTOLIC BLOOD PRESSURE: 117 MMHG | HEART RATE: 86 BPM

## 2024-07-03 DIAGNOSIS — G43.709 CHRONIC MIGRAINE W/O AURA W/O STATUS MIGRAINOSUS, NOT INTRACTABLE: Primary | ICD-10-CM

## 2024-07-03 DIAGNOSIS — J30.9 CHRONIC ALLERGIC RHINITIS: ICD-10-CM

## 2024-07-03 PROCEDURE — 70486 CT MAXILLOFACIAL W/O DYE: CPT | Mod: TC

## 2024-07-03 PROCEDURE — 70486 CT MAXILLOFACIAL W/O DYE: CPT | Mod: 26,,, | Performed by: RADIOLOGY

## 2024-07-03 NOTE — PROCEDURES
Botulinum Injection  Location: Head/Face/Jaw    Date/Time: 7/3/2024 4:00 PM    Performed by: Shadia Valdivia MD  Authorized by: Shadia Valdivia MD      Universal protocol:      Patient identity confirmed:  Verbally with patient    Procedure details:      EMG used?  No     Diluted by:  Preservative free saline     Laterality: Bilateral     Toxin (Brand):  OnaBoNT-A (Botox)     Concentration (u/mL):  2:1     Total units available:  200       Right frontalis:  10 units divided amongst 3 site(s)     Left frontalis:  10 units divided amongst 3 site(s)     Right :  5 units divided amongst 1 site(s)     Left :  5 units divided amongst 1 site(s)     Procerus (midline):  5 units divided amongst 1 site(s)     Right occipitalis:  20 units divided amongst 4 site(s)     Left occipitalis:  20 units divided amongst 4 site(s)     Right cervical paraspinal:  15 units divided amongst 3 site(s)     Left cervical paraspinal:  15 units divided amongst 3 site(s)     Right trapezius:  25 units divided amongst 2 site(s)     Left trapezius:  25 units divided amongst 2 site(s)     Right temporalis:  20 units divided amongst 4 site(s)     Left temporalis:  20 units divided amongst 4 site(s)         Ad hoc region injected:  Midline occiput with 5 units     Total units injected:  200     Total units wasted:  0  Medications: 200 Units onabotulinumtoxina 200 unit    Post-procedure details:      Patient tolerance of procedure:  Tolerated well, no immediate complications  Comments: Ksenia reports she did very well with the Botox injection in his very pleased with the reduction of migraine frequency

## 2024-07-09 ENCOUNTER — PATIENT MESSAGE (OUTPATIENT)
Dept: REHABILITATION | Facility: HOSPITAL | Age: 49
End: 2024-07-09
Payer: COMMERCIAL

## 2024-07-19 ENCOUNTER — CLINICAL SUPPORT (OUTPATIENT)
Dept: REHABILITATION | Facility: HOSPITAL | Age: 49
End: 2024-07-19
Payer: COMMERCIAL

## 2024-07-19 DIAGNOSIS — T45.1X5A AROMATASE INHIBITOR-ASSOCIATED ARTHRALGIA: Primary | ICD-10-CM

## 2024-07-19 DIAGNOSIS — M25.50 AROMATASE INHIBITOR-ASSOCIATED ARTHRALGIA: Primary | ICD-10-CM

## 2024-07-19 PROCEDURE — 97811 ACUP 1/> W/O ESTIM EA ADD 15: CPT | Performed by: ACUPUNCTURIST

## 2024-07-19 PROCEDURE — 97810 ACUP 1/> WO ESTIM 1ST 15 MIN: CPT | Performed by: ACUPUNCTURIST

## 2024-07-19 NOTE — PROGRESS NOTES
Acupuncture Evaluation Note     Name: Ksenia Arce  [facility] Number: [id number]    Traditional Chinese Medicine (TCM) Diagnosis: Qi Stagnation and Blood Stasis  Medical Diagnosis:   Encounter Diagnosis   Name Primary?    Aromatase inhibitor-associated arthralgia Yes          Evaluation Date: 7/19/2024    Visit #/Visits authorized: 12, 4/12    Precautions: Standard    Subjective     Chief Concern: joint pain in legs and into the knees - and sometimes down into the ankles    Medical necessity is demonstrated by the following IMPAIRMENTS: Medical Necessity: Decreased mobility limits day to day activities, social, and emergent situations and Decreased quality of life              Aggravating Factors:   variable    Relieving Factors:  heat and ice    Symptom Description:     Quality:  Aching and Dull  Severity:  3  Frequency:  continuously      Treatment     Treatment Principles:  move qi and blood    Acupuncture points used:  4 MAST, Du20, Gb34, Ki3, Li11, Sp10, Sp6, Sp9, St36, and YIN GRIMES  + sagar upper leg, heding, xiyan  Needles In: 22  Needles Out: 22  [place] W/ STIM placed: 2:10 PM  Needles W/ STIM removed: 2:40 PM      Other Traditional Chinese Medicine Modalities -  heat lamp    Assessment     After treatment, patient feels relief with visits, has increased migraine the last few days.continue with care as needed    Patient prognosis is Good.     Patient will continue to benefit from acupuncture treatment to address the deficits listed in the problem list box on initial evaluation, provide patient family education and to maximize pt's level of independence in the home and community environment.     Patient's spiritual, cultural and educational needs considered and pt agreeable to plan of care and goals. Discussed anti-inflammatory diet - tumeric, garlic, cut out dairy as needed.     Anticipated barriers to treatment: none    Plan     Recommend 1 /week for 2 sessions then re-assess.      Education:  Patient is  aware of cumulative benefit of acupuncture

## 2024-08-02 ENCOUNTER — OFFICE VISIT (OUTPATIENT)
Dept: OTOLARYNGOLOGY | Facility: CLINIC | Age: 49
End: 2024-08-02
Payer: COMMERCIAL

## 2024-08-02 VITALS
DIASTOLIC BLOOD PRESSURE: 93 MMHG | SYSTOLIC BLOOD PRESSURE: 126 MMHG | BODY MASS INDEX: 25.06 KG/M2 | WEIGHT: 150.56 LBS | HEART RATE: 78 BPM

## 2024-08-02 DIAGNOSIS — J34.3 HYPERTROPHY OF INFERIOR NASAL TURBINATE: Chronic | ICD-10-CM

## 2024-08-02 DIAGNOSIS — J30.9 CHRONIC ALLERGIC RHINITIS: Chronic | ICD-10-CM

## 2024-08-02 DIAGNOSIS — J32.9 CHRONIC RHINOSINUSITIS WITH MULTIPLE NASAL POLYPS: Primary | Chronic | ICD-10-CM

## 2024-08-02 DIAGNOSIS — J34.2 NASAL SEPTAL DEVIATION: Chronic | ICD-10-CM

## 2024-08-02 DIAGNOSIS — J33.8 POLYPOSIS OF SINONASAL REGION: Chronic | ICD-10-CM

## 2024-08-02 DIAGNOSIS — J33.9 CHRONIC RHINOSINUSITIS WITH MULTIPLE NASAL POLYPS: Primary | Chronic | ICD-10-CM

## 2024-08-02 PROCEDURE — 99999 PR PBB SHADOW E&M-EST. PATIENT-LVL IV: CPT | Mod: PBBFAC,,, | Performed by: OTOLARYNGOLOGY

## 2024-08-02 RX ORDER — CEFDINIR 300 MG/1
300 CAPSULE ORAL 2 TIMES DAILY
Qty: 28 CAPSULE | Refills: 0 | Status: SHIPPED | OUTPATIENT
Start: 2024-08-02 | End: 2024-08-16

## 2024-08-02 NOTE — PROGRESS NOTES
Chief Complaint   Patient presents with    Follow-up     Some improvement with treatment but symptoms returned after completion   .    HPI:     Ksenia Arce is a 48 y.o. female who presents for evaluation of concern for nasal polyp noted on MRI brain in work up of left  breast cancer. She reports that she has symptoms of nasal congestion, nasal blockage, and posterior nasal discharge, decreased sense of smell. She describes difficulty breathing at night. There is bilateral nasal obstruction but left side is worse. She does not use sinus rinses or nasal sprays. She admits to midface pain and pressure..  She has not had sinus or nasal surgery. There is no history of sinonasal trauma. She does have history of allergic rhinitis and was on immunotherapy 20 years ago.     Interval HPI 8/2/2024:  Follow up visit. Report that she has been using qnasl rinses, astelin, xyzal. She has been using nasal saline rinses but it does cause her to vomit when she uses them.  She notes that she has been having increased nasal congestion,  post-nasal drip and sinus pressure. Sense of smell seems to be about 50% better but still with hyposmia. She did have CT scan of the sinuses and immunoCAP testing complete.  She does have 3 dogs and 1 cat at home.       Past Medical History:   Diagnosis Date    Breast cancer 05/2016    ER+ ILC    History of antineoplastic chemotherapy 2016    6/30/2016-11/16/2016    History of therapeutic radiation 2017    left chest, completed 5/10/17, dose 9500 cGy    Mental disorder     Migraine headache     Neuropathy      Social History     Socioeconomic History    Marital status:    Tobacco Use    Smoking status: Never     Passive exposure: Never    Smokeless tobacco: Never   Substance and Sexual Activity    Alcohol use: Yes     Alcohol/week: 2.0 standard drinks of alcohol     Types: 2 Glasses of wine per week    Drug use: Never    Sexual activity: Yes     Partners: Male     Birth  control/protection: Post-menopausal     Social Determinants of Health     Stress: Stress Concern Present (1/23/2020)    Citizen of [place] of Occupational [employer] - Occupational Stress Questionnaire     Feeling of Stress : To some extent     Past Surgical History:   Procedure Laterality Date    BREAST CAPSULECTOMY      BREAST RECONSTRUCTION      COLONOSCOPY N/A 09/17/2021    Procedure: COLONOSCOPY;  Surgeon: Diony Montes MD;  Location: [facility];  Service: Endoscopy;  Laterality: N/A;    COSMETIC SURGERY  [id number]    Reconstruction    HYSTERECTOMY  2019    MASTECTOMY Left 12/26/2016    TONSILLECTOMY      TOTAL ABDOMINAL HYSTERECTOMY W/ BILATERAL SALPINGOOPHORECTOMY  2018     Family History   Problem Relation Name Age of Onset    Hypertension Mother      Colon polyps Mother      Crohn's disease Mother      Clotting disorder Mother      Coronary artery disease Mother      Hypertension Father Father     Heart disease Father Father     Breast cancer Paternal Grandmother  72    Breast cancer Maternal Cousin Shadia 59        stage 0           Review of Systems  General: negative for chills, fever or weight loss  Psychological: negative for mood changes or depression  Ophthalmic: negative for blurry vision, photophobia or eye pain  ENT: see HPI  Respiratory: no cough, shortness of breath, or wheezing  Cardiovascular: no chest pain or dyspnea on exertion  Gastrointestinal: no abdominal pain, change in bowel habits, or black/ bloody stools  Musculoskeletal: negative for gait disturbance or muscular weakness  Neurological: no syncope or seizures; no ataxia  Dermatological: negative for puritis,  rash and jaundice  Hematologic/lymphatic: no easy bruising, no new lumps or bumps      Physical Exam:    Vitals:    08/02/24 1124   BP: (!) 126/93   Pulse: 78         Constitutional: Well appearing / communicating without difficutly.  NAD.  Eyes: EOM I Bilaterally  Head/Face: Normocephalic.  Negative paranasal sinus  pressure/tenderness.  Salivary glands WNL.  House Brackmann I Bilaterally.    Right Ear: Auricle normal appearance. External Auditory Canal within normal limits,TM w/o masses/lesions/perforations. TM mobility noted.   Left Ear: Auricle normal appearance. External Auditory Canal WNL,TM w/o masses/lesions/perforations. TM mobility noted.  Nose: + nasal septal deviation to the right. Inferior Turbinates 3+ bilaterally. No septal perforation. No masses/lesions. External nasal skin appears normal without masses/lesions.  Oral Cavity: Gingiva/lips within normal limits.  Dentition/gingiva healthy appearing. Mucus membranes moist. Floor of mouth soft, no masses palpated. Oral Tongue mobile. Hard Palate appears normal.    Oropharynx: Base of tongue appears normal. No masses/lesions noted. Tonsillar fossa/pharyngeal wall without lesions. Posterior oropharynx WNL.  Soft palate without masses. Midline uvula.   Neck/Lymphatic: No LAD I-VI bilaterally.  No thyromegaly.  No masses noted on exam.        Diagnostic studies:   CT sinus 7/3/2024:  Images interpreted by me and reviewed with patient in detail today.  FINDINGS:  Focal lobular mucosal thickening along the floor of left maxillary sinus measuring up to 8 mm in thickness.  Maxillary ostium appears patent.     There is additional lobular mucosal thickening along the floor the right maxillary sinus with superimposed mucous retention cyst.  The right maxillary ostium is patent.  Mild focal mucosal thickening in the anterior aspect of the left sphenoid sinus with opacification of the sphenoethmoidal recess.  The right sphenoid sinus is clear.     Mild patchy mucosal thickening the ethmoid air cells extending into the inferior most aspect of the frontal sinuses.  No layering fluid or aerated secretions.     Mild osseous thickening sclerosis about the left maxillary sinus indicative of chronic inflammation.     Lobular opacity in the left middle meatus measuring up to 1.5 cm in  craniocaudal extent (series 601, image 37).  No associated mass effect or osseous destruction.  Slight nasal septal deviation. Nasal cavity otherwise unremarkable.     There is some endodontal disease with periapical lucency associated with the medial root of the left maxillary 1st molar tooth.  This remodels the floor the left maxillary antrum.     Limited intracranial evaluation is unremarkable.     Extracranial soft tissue structures appear within normal limits.     Latest Reference Range & Units 05/29/24 15:11   A. alternata IgE <0.10 kU/L 0.60 (H)   Altern. alternata Class  CLASS 1   Aspergillus Fumigatus IgE <0.10 kU/L <0.10   A. fumigatus Class  CLASS 0   Bald [place] Class  CLASS 0   [place] Grass IgE <0.10 kU/L <0.10   [place] Grass Class  CLASS 0   [place] Maple Tree IgE <0.10 kU/L <0.10   Allergen Maple ([place]) Class  CLASS 0   Cat Dander IgE <0.10 kU/L 1.62 (H)   Cat Epithelium Class  CLASS 2   [place] IgE <0.10 kU/L <0.10   [place] Class  CLASS 0   Cladosporium Class  CLASS 0   Cladosporium IgE <0.10 kU/L <0.10   Cockroach Class  CLASS 0   Cockroach, IgE <0.10 kU/L <0.10   [place] <0.10 kU/L <0.10   D. farinae <0.10 kU/L <0.10   D. farinae Class  CLASS 0   D. pteronyssinus Dust Mite IgE <0.10 kU/L <0.10   D. pteronyssinus Class  CLASS 0   Dog Dander IgE <0.10 kU/L 7.42 (H)   Dog Dander Class  CLASS 3   [place], IgE <0.10 kU/L <0.10   [place] Class  CLASS 0   English Plantain IgE <0.10 kU/L <0.10   English Plantain Class  CLASS 0   [place] Grass IgE <0.10 kU/L 0.10   [place]ick Grass Class  CLASS 0/1   [place]er IgE <0.10 kU/L <0.10   Marshelder Class  CLASS 0   [place]ow Grass ([place]), IgE <0.10 kU/L 0.33 (H)   Meadow Grass ([place]) Class  CLASS 0/1   Mucor racemosus, IgE <0.10 kU/L <0.10   Mucor racemosus Class  CLASS 0   [place] Tree IgE <0.10 kU/L <0.10   Allergen [place] Class  CLASS 0   NETTLE <0.10 kU/L <0.10   Nettle Class  CLASS 0   [place], Class  CLASS 0   Pecan [place] Tree IgE  <0.10 kU/L <0.10   Pecan, Class  CLASS 0   Penicillium IgE <0.10 kU/L <0.10   Penicillium Class  CLASS 0   Pigweed IgE <0.10 kU/L <0.10   Common Pigweed Class  CLASS 0   Ragweed, Short, Class  CLASS 0   Ragweed, Short, IgE <0.10 kU/L <0.10   RAST Allergen for [place] <0.70 kU/L <0.10   [place]ch Tree IgE <0.10 kU/L <0.10   Silver [place]ch Class  CLASS 0   [place] Grass IgE <0.10 kU/L 0.14 (H)   [place] Grass Class  CLASS 0/1   [place] Tree IgE <0.10 kU/L <0.10   (H): Data is abnormally high    MRI brain w/ w/o contrast 5/16/2024:  Impression:     Stable single punctate susceptibility focus with associated FLAIR signal hyperintensity and questionable surrounding curvilinear enhancement.  Findings remain nonspecific, and may represent remote microhemorrhage or small venous vascular malformation, noting that a small metastatic focus cannot be completely excluded.  No new enhancing lesions identified elsewhere.     No acute intracranial pathology.     Similar paranasal sinus disease as well as T2 hyperdense focus about the left nasal cavity.  Findings may represent inspissated secretions, noting that a nasal polyp could present similarly.  Correlation is advised.    MRI brain w/ w/o contrast 2/7/2024:  Impression:     Single punctate susceptibility with FLAIR hyperintensity and questionable ill-defined enhancement.  This is nonspecific and may represent remote microhemorrhage or venous vascular malformation.  In light of history hemorrhagic metastases although felt less likely cannot be entirely excluded..  Clinical correlation and short-term follow-up recommended     Please note there are lobular foci in the maxillary antra suggestive for mucous retention cyst with lobular focus in the left nasal cavity suggestive for possible polyp.  Clinical correlation and correlation with direct visual inspection advised         Assessment:    ICD-10-CM ICD-9-CM    1. Chronic rhinosinusitis with multiple nasal polyps   J32.9 473.9     J33.9 471.9       2. Polyposis of sinonasal region  J33.8 471.8       3. Chronic allergic rhinitis  J30.9 477.9 Ambulatory referral/consult to ENT      4. Hypertrophy of inferior nasal turbinate  J34.3 478.0           The primary encounter diagnosis was Chronic rhinosinusitis with multiple nasal polyps. Diagnoses of Polyposis of sinonasal region, Chronic allergic rhinitis, and Hypertrophy of inferior nasal turbinate were also pertinent to this visit.      Plan:  No orders of the defined types were placed in this encounter.    Recommend Ciro Med Sinus Rinse BID; distilled water only(maintenance).  Continue  Qnasl 2 sprays per nostril daily (spray laterally).  Continue Astelin 1 spray per nostril BID  Continue xyzal 5mg PO daily  Start omnicef 300 mg PO BID   I discussed that she has persistent chronic rhinosinusitis with nasal polyps present on post treatment CT scan of sinuses.  She has been on maximal medical therapy including 8 weeks nasal saline rinses, nasal steroid QNASL, Astelin, Xyzal, and 3 weeks of Augmentin therapy with limited benefit.  She would benefit from endoscopic sinus surgery and septoplasty for the treatment of her condition.  This would include the ethmoid, maxillary and frontal sinuses and would be bilateral.  Inferior turbinate reduction would be included.  I discussed the risks, benefits and alternatives to surgery with the patient, as well as the expected postoperative course.  I gave her the opportunity to ask questions and I answered all of them.  She will consider this option and notify us if she wishes to proceed.     Follow up in 6 weeks     Susy Tsai MD

## 2024-08-02 NOTE — PROCEDURES
Nasal/sinus endoscopy    Date/Time: 8/2/2024 11:20 AM    Performed by: Susy Mckeon MD  Authorized by: Susy Mckeon MD    Consent Done?:  Yes (Verbal)  Anesthesia:     Local anesthetic:  Lidocaine 2% and Yovani-Synephrine 1/2%  Nose:     Procedure Performed:  Nasal Endoscopy  External:      No external nasal deformity  Intranasal:      Mucosa polyps     Mucosa ulcers not present     No mucosa lesions present     Turbinates not enlarged     No septum gross deformity  Nasopharynx:      Posterior choanae patent     Eustachian tube patent        Bilateral middle turbinate edema with narrowing of the middle meatus; + narrowing of sphenoethmoid recess; +polyp left middle meatus; + purulence left middle meatus

## 2024-08-08 RX ORDER — BUTALBITAL, ACETAMINOPHEN, CAFFEINE AND CODEINE PHOSPHATE 300; 50; 40; 30 MG/1; MG/1; MG/1; MG/1
CAPSULE ORAL
Qty: 12 CAPSULE | Refills: 0 | Status: SHIPPED | OUTPATIENT
Start: 2024-08-08

## 2024-08-08 RX ORDER — NALOXONE HYDROCHLORIDE 4 MG/.1ML
SPRAY NASAL
Qty: 2 EACH | Refills: 0 | Status: SHIPPED | OUTPATIENT
Start: 2024-08-08

## 2024-08-21 ENCOUNTER — OFFICE VISIT (OUTPATIENT)
Dept: HEMATOLOGY/ONCOLOGY | Facility: CLINIC | Age: 49
End: 2024-08-21
Payer: COMMERCIAL

## 2024-08-21 ENCOUNTER — LAB VISIT (OUTPATIENT)
Dept: LAB | Facility: HOSPITAL | Age: 49
End: 2024-08-21
Payer: COMMERCIAL

## 2024-08-21 VITALS
BODY MASS INDEX: 25.45 KG/M2 | OXYGEN SATURATION: 100 % | SYSTOLIC BLOOD PRESSURE: 133 MMHG | HEART RATE: 72 BPM | HEIGHT: 65 IN | WEIGHT: 152.75 LBS | DIASTOLIC BLOOD PRESSURE: 87 MMHG | RESPIRATION RATE: 20 BRPM

## 2024-08-21 DIAGNOSIS — Z17.0 MALIGNANT NEOPLASM OF UPPER-INNER QUADRANT OF LEFT BREAST IN FEMALE, ESTROGEN RECEPTOR POSITIVE: Primary | ICD-10-CM

## 2024-08-21 DIAGNOSIS — C50.212 MALIGNANT NEOPLASM OF UPPER-INNER QUADRANT OF LEFT BREAST IN FEMALE, ESTROGEN RECEPTOR POSITIVE: ICD-10-CM

## 2024-08-21 DIAGNOSIS — J33.9 NASAL POLYP: ICD-10-CM

## 2024-08-21 DIAGNOSIS — C77.9 SECONDARY ADENOCARCINOMA OF LYMPH NODE: ICD-10-CM

## 2024-08-21 DIAGNOSIS — T45.1X5A PERIPHERAL NEUROPATHY DUE TO CHEMOTHERAPY: ICD-10-CM

## 2024-08-21 DIAGNOSIS — G62.0 PERIPHERAL NEUROPATHY DUE TO CHEMOTHERAPY: ICD-10-CM

## 2024-08-21 DIAGNOSIS — Z17.0 MALIGNANT NEOPLASM OF UPPER-INNER QUADRANT OF LEFT BREAST IN FEMALE, ESTROGEN RECEPTOR POSITIVE: ICD-10-CM

## 2024-08-21 DIAGNOSIS — E55.9 VITAMIN D DEFICIENCY: ICD-10-CM

## 2024-08-21 DIAGNOSIS — M25.50 AROMATASE INHIBITOR-ASSOCIATED ARTHRALGIA: ICD-10-CM

## 2024-08-21 DIAGNOSIS — Z79.811 USE OF ANASTROZOLE: ICD-10-CM

## 2024-08-21 DIAGNOSIS — T45.1X5A AROMATASE INHIBITOR-ASSOCIATED ARTHRALGIA: ICD-10-CM

## 2024-08-21 DIAGNOSIS — C50.212 MALIGNANT NEOPLASM OF UPPER-INNER QUADRANT OF LEFT BREAST IN FEMALE, ESTROGEN RECEPTOR POSITIVE: Primary | ICD-10-CM

## 2024-08-21 DIAGNOSIS — M85.89 OSTEOPENIA OF MULTIPLE SITES: ICD-10-CM

## 2024-08-21 LAB
25(OH)D3+25(OH)D2 SERPL-MCNC: 30 NG/ML (ref 30–96)
ALBUMIN SERPL BCP-MCNC: 4 G/DL (ref 3.5–5.2)
ALP SERPL-CCNC: 64 U/L (ref 55–135)
ALT SERPL W/O P-5'-P-CCNC: 66 U/L (ref 10–44)
ANION GAP SERPL CALC-SCNC: 8 MMOL/L (ref 8–16)
AST SERPL-CCNC: 42 U/L (ref 10–40)
BILIRUB SERPL-MCNC: 0.7 MG/DL (ref 0.1–1)
BUN SERPL-MCNC: 14 MG/DL (ref 6–20)
CALCIUM SERPL-MCNC: 9.6 MG/DL (ref 8.7–10.5)
CHLORIDE SERPL-SCNC: 108 MMOL/L (ref 95–110)
CO2 SERPL-SCNC: 25 MMOL/L (ref 23–29)
CREAT SERPL-MCNC: 0.7 MG/DL (ref 0.5–1.4)
ERYTHROCYTE [DISTWIDTH] IN BLOOD BY AUTOMATED COUNT: 12.6 % (ref 11.5–14.5)
EST. GFR  (NO RACE VARIABLE): >60 ML/MIN/1.73 M^2
GLUCOSE SERPL-MCNC: 94 MG/DL (ref 70–110)
HCT VFR BLD AUTO: 43.2 % (ref 37–48.5)
HGB BLD-MCNC: 14.6 G/DL (ref 12–16)
IMM GRANULOCYTES # BLD AUTO: 0.01 K/UL (ref 0–0.04)
MCH RBC QN AUTO: 29.9 PG (ref 27–31)
MCHC RBC AUTO-ENTMCNC: 33.8 G/DL (ref 32–36)
MCV RBC AUTO: 89 FL (ref 82–98)
NEUTROPHILS # BLD AUTO: 2.7 K/UL (ref 1.8–7.7)
PLATELET # BLD AUTO: 230 K/UL (ref 150–450)
PMV BLD AUTO: 9.6 FL (ref 9.2–12.9)
POTASSIUM SERPL-SCNC: 4.6 MMOL/L (ref 3.5–5.1)
PROT SERPL-MCNC: 7.1 G/DL (ref 6–8.4)
RBC # BLD AUTO: 4.88 M/UL (ref 4–5.4)
SODIUM SERPL-SCNC: 141 MMOL/L (ref 136–145)
WBC # BLD AUTO: 4.48 K/UL (ref 3.9–12.7)

## 2024-08-21 PROCEDURE — 1159F MED LIST DOCD IN RCRD: CPT | Mod: CPTII,S$GLB,, | Performed by: STUDENT IN AN ORGANIZED HEALTH CARE EDUCATION/TRAINING PROGRAM

## 2024-08-21 PROCEDURE — 3008F BODY MASS INDEX DOCD: CPT | Mod: CPTII,S$GLB,, | Performed by: STUDENT IN AN ORGANIZED HEALTH CARE EDUCATION/TRAINING PROGRAM

## 2024-08-21 PROCEDURE — 36415 COLL VENOUS BLD VENIPUNCTURE: CPT | Performed by: NURSE PRACTITIONER

## 2024-08-21 PROCEDURE — G2211 COMPLEX E/M VISIT ADD ON: HCPCS | Mod: S$GLB,,, | Performed by: STUDENT IN AN ORGANIZED HEALTH CARE EDUCATION/TRAINING PROGRAM

## 2024-08-21 PROCEDURE — 3079F DIAST BP 80-89 MM HG: CPT | Mod: CPTII,S$GLB,, | Performed by: STUDENT IN AN ORGANIZED HEALTH CARE EDUCATION/TRAINING PROGRAM

## 2024-08-21 PROCEDURE — 99999 PR PBB SHADOW E&M-EST. PATIENT-LVL IV: CPT | Mod: PBBFAC,,, | Performed by: STUDENT IN AN ORGANIZED HEALTH CARE EDUCATION/TRAINING PROGRAM

## 2024-08-21 PROCEDURE — 3075F SYST BP GE 130 - 139MM HG: CPT | Mod: CPTII,S$GLB,, | Performed by: STUDENT IN AN ORGANIZED HEALTH CARE EDUCATION/TRAINING PROGRAM

## 2024-08-21 PROCEDURE — 99215 OFFICE O/P EST HI 40 MIN: CPT | Mod: S$GLB,,, | Performed by: STUDENT IN AN ORGANIZED HEALTH CARE EDUCATION/TRAINING PROGRAM

## 2024-08-21 PROCEDURE — 82306 VITAMIN D 25 HYDROXY: CPT | Performed by: NURSE PRACTITIONER

## 2024-08-21 PROCEDURE — 80053 COMPREHEN METABOLIC PANEL: CPT | Performed by: NURSE PRACTITIONER

## 2024-08-21 PROCEDURE — 85027 COMPLETE CBC AUTOMATED: CPT | Performed by: NURSE PRACTITIONER

## 2024-08-21 NOTE — PROGRESS NOTES
"                                                         PROGRESS NOTE    Subjective:       Patient ID: Ksenia Arce   MRN: [id number]  : 1975    Chief Complaint: HR+ Left Breast cancer     History of Present Illness:   Ksenia Arce is a 48 y.o. female who woman with history of HR+ breast cancer. Her oncologic history is as follows:    -initially diagnosed with 2016 (Dr. Armstrong)- ILC of the inner quadrant of the L breast, [id number]. Stage IIIA. ER positive, LA positive, Her2 negative.   -completed neoadjuvant chemotherapy with ddAC-T  -underwent L mastectomy. Final pathology: residual disease in the breast and LN  -completed adjuvant radiation  -initially treatment with Lupron + anastrozole 2017. After GOPAL/BSO in  Lupron was discontinued  -previously following with Dr. Coles; last seen by her 2024. At that time was having several symptoms prompting imaging studies. CT CAP and bone scan showed HENRY. MRI brain showed single punctate hyperintensity, likely microhemorrhage vs venous vascular malformation.  Repeat MRI brain in 2024 was stable; did demonstrate a nasal polyp that she has since seen ENT about    FH: paternal grandmother had breast cancer in her 70s  Had genetic testing in  that was negative     Interval history:  Ms. Arce presents today for evaluation. Continues to tolerate anastrozole without difficulty. Denies breast changes or concerns.     ROS:   12pt ROS negative except as noted above     Objective:     Vitals:    24 0853   BP: 133/87   Pulse: 72   Resp: 20   SpO2: 100%   Weight: 69.3 kg (152 lb 12.5 oz)   Height: 5' 5" (1.651 m)   PainSc: 0-No pain     Wt Readings from Last 10 Encounters:   24 69.3 kg (152 lb 12.5 oz)   24 68.3 kg (150 lb 9.2 oz)   24 69.4 kg (153 lb 1.8 oz)   24 69 kg (152 lb 1.9 oz)   24 67 kg (147 lb 11.3 oz)   24 68.5 kg (150 lb 14.5 oz)   24 69 kg (152 lb 1.9 oz)   24 68.2 kg (150 lb 5.7 oz) "   12/01/23 68.5 kg (151 lb 0.2 oz)   11/20/23 68.6 kg (151 lb 3.1 oz)       Physical Examination:   ECOG 0   General: well appearing, in no apparent distress  HEENT: Normocephalic, EOMI, anicteric sclerae, MMM  Neck: supple, without cervical or supraclavicular lymphadenopathy.  Heart: well-perfused  Lungs: no increased wob  Breast: s/p L mastectomy w reconstruction, incisions well-healed. No Rt breast masses or LAD  Abdomen: Soft, nontender, nondistended  Extremities: No LE edema or joint effusion  Skin: warm, well-perfused, no rash  Neurologic: Alert and oriented x 4, normal speech and gait   Psychiatric: Conversing appropriately with providers throughout today's encounter.      Diagnostic Tests:  Reviewed recent labs, imaging and pathology.      Assessment/Plan:   Ms. Arce is a very pleasant 49yo woman with history of T2N2a HR+ L-sided breast cancer who presents today for follow up.    She is s/p na chemotherapy, L mastectomy, adjuvant radiation and remains on anastrozole since 12/2017 with good tolerance.      #HR+ breast cancer:  --plan to continue anastrozole for 10 years given LN involvement (SOT 12/2017)  --cont surveillance with alternating MMG and MRI. MRI scheduled 10/2024, Rt MMG 5/2025  --RTC 6mo    #AI related Arthralgias: stable   --cont to monitor      #Osteopenia of multiple site: resolved on most recent DEXA 5/2024- showed normal density  --will hold off on continuing prolia for now. Will re-evaluate with DEXA 5/2026 and can add back if needed      #Peripheral neuropathy due to chemotherapy: stable   --Continue gabapentin.     #Anxiety   --On Fluoxetine and lorazepam.     #HTN  --Continue antihypertenisves     All questions were answered to her apparent satisfaction. Will see her back in 6 months or sooner should the need arise.      Route Chart for Scheduling    Med Onc Chart Routing      Follow up with physician 6 months.   Follow up with ANNE    Infusion scheduling note    Injection scheduling  note    Labs    Imaging   As scheduled   Pharmacy appointment    Other referrals                    Therapy Plan Information  Medications  denosumab (PROLIA) injection 60 mg  60 mg, Subcutaneous, Every 26 weeks          MDM includes  :    - Acute or chronic illness or injury that poses a threat to life or bodily function  - Independent review and explanation of 3+ results from unique tests  - Discussion of management and ordering 3+ unique tests  - Extensive discussion of treatment and management  - Prescription drug management  - Drug therapy requiring intensive monitoring for toxicity     Gianna Mota MD

## 2024-09-24 ENCOUNTER — PROCEDURE VISIT (OUTPATIENT)
Dept: NEUROLOGY | Facility: CLINIC | Age: 49
End: 2024-09-24
Payer: COMMERCIAL

## 2024-09-24 VITALS
HEIGHT: 65 IN | DIASTOLIC BLOOD PRESSURE: 78 MMHG | SYSTOLIC BLOOD PRESSURE: 116 MMHG | BODY MASS INDEX: 25.71 KG/M2 | WEIGHT: 154.31 LBS | HEART RATE: 79 BPM

## 2024-09-24 DIAGNOSIS — G43.709 CHRONIC MIGRAINE W/O AURA W/O STATUS MIGRAINOSUS, NOT INTRACTABLE: Primary | ICD-10-CM

## 2024-09-24 PROCEDURE — 64615 CHEMODENERV MUSC MIGRAINE: CPT | Mod: S$GLB,,, | Performed by: PSYCHIATRY & NEUROLOGY

## 2024-09-24 NOTE — PROCEDURES
Botulinum Injection  Location: Head/Face/Jaw    Date/Time: 9/24/2024 3:40 PM    Performed by: Shadia Valdivia MD  Authorized by: Shadia Valdivia MD      Consent:      Consent obtained:  Verbal    Universal protocol:      Patient identity confirmed:  Verbally with patient    Procedure details:      Diluted by:  Preservative free saline     Laterality: Bilateral     Toxin (Brand):  OnaBoNT-A (Botox)       Right frontalis:  12.5 units divided amongst 3 site(s)     Left frontalis:  12.5 units divided amongst 3 site(s)     Right :  5 units divided amongst 1 site(s)     Left :  5 units divided amongst 1 site(s)     Procerus (midline):  5 units divided amongst 1 site(s)     Right occipitalis:  15 units divided amongst 3 site(s)     Left occipitalis:  15 units divided amongst 3 site(s)     Right cervical paraspinal:  10 units divided amongst 2 site(s)     Left cervical paraspinal:  10 units divided amongst 2 site(s)     Right trapezius:  25 units divided amongst 2 site(s)     Left trapezius:  25 units divided amongst 2 site(s)     Right temporalis:  20 units divided amongst 4 site(s)     Left temporalis:  20 units divided amongst 4 site(s)         Ad hoc region injected:  Over mastoids with 20 units     Total units injected:  200     Total units wasted:  -200  Medications: 200 Units onabotulinumtoxina (BOTOX) injection 200 UNIT INJ    Post-procedure details:      Patient tolerance of procedure:  Tolerated well, no immediate complications  Comments: Did very well with botox 3 mos ago ( her first in yrs). Avg one per week and can abort with one fioricet or ubrelvy

## 2024-09-26 ENCOUNTER — PATIENT MESSAGE (OUTPATIENT)
Dept: PRIMARY CARE CLINIC | Facility: CLINIC | Age: 49
End: 2024-09-26
Payer: COMMERCIAL

## 2024-10-09 DIAGNOSIS — M50.30 DDD (DEGENERATIVE DISC DISEASE), CERVICAL: Primary | ICD-10-CM

## 2024-10-09 NOTE — PROGRESS NOTES
DATE: 10/18/2024  PATIENT: Kseina Arce    Supervising Physician: Renzo Conde M.D.    CHIEF COMPLAINT: neck and back pain    HISTORY:  Ksenia Arce is a 49 y.o. female here for initial evaluation of neck and intermittent right arm pain (Neck - 1, parascapular - 5, Arm - 0).  The pain has been present for months, but increased after going hiking and wearing a backpack about 1 months ago. The patient describes the pain as constant and dull. The pain is worse with nothing in particular and improved by nothing. There is intermittent associated numbness and tingling in the right arm. There is no subjective weakness. Prior treatments have included the chiropractor, but no ANABEL or surgery.   The patient denies myelopathic symptoms such as handwriting changes or difficulty with buttons/coins/keys. Denies perineal paresthesias, bowel/bladder dysfunction.    PAST MEDICAL/SURGICAL HISTORY:  Past Medical History:   Diagnosis Date    Breast cancer 05/2016    ER+ ILC    History of antineoplastic chemotherapy 2016    6/30/2016-11/16/2016    History of therapeutic radiation 2017    left chest, completed 5/10/17, dose 9500 cGy    Mental disorder     Migraine headache     Neuropathy      Past Surgical History:   Procedure Laterality Date    BREAST CAPSULECTOMY      BREAST RECONSTRUCTION      COLONOSCOPY N/A 09/17/2021    Procedure: COLONOSCOPY;  Surgeon: Diony Montes MD;  [facility];  Service: Endoscopy;  Laterality: N/A;    COSMETIC SURGERY  [id number]    Reconstruction    HYSTERECTOMY  2019    MASTECTOMY Left 12/26/2016    TONSILLECTOMY      TOTAL ABDOMINAL HYSTERECTOMY W/ BILATERAL SALPINGOOPHORECTOMY  2018       Medications:   Current Outpatient Medications on File Prior to Visit   Medication Sig Dispense Refill    anastrozole (ARIMIDEX) 1 mg Tab Take 1 tablet (1 mg total) by mouth once daily. 90 tablet 6    ascorbic acid, vitamin C, (VITAMIN C) 500 MG tablet Take 500 mg by mouth.      atogepant (QULIPTA) 60 mg  Tab Take 1 tablet (60 mg) by mouth once daily. 30 tablet 9    azelastine (ASTELIN) 137 mcg (0.1 %) nasal spray 1 spray (137 mcg total) by Nasal route 2 (two) times daily. 30 mL 3    beclomethasone dipropionate (QNASL) 80 mcg/actuation HFAA Use 2 sprays (160 mcg) by Nasal route once daily. 10.6 g 6    benazepril-hydrochlorthiazide (LOTENSIN HCT) 10-12.5 mg Tab Take 1 tablet by mouth once daily. 90 tablet 3    butalbitaL-acetaminop-caf-cod (FIORICET WITH CODEINE) -29-30 mg Cap Take 1 capsule by mouth every 6 hours as needed for migraine 12 capsule 0    calcium carbonate (TUMS) 200 mg calcium (500 mg) chewable tablet Take 1 tablet by mouth.      CALCIUM GLUCONATE ORAL Take by mouth.      FLUoxetine 40 MG capsule Take 1 capsule (40 mg total) by mouth every morning. 90 capsule 3    levocetirizine (XYZAL) 5 MG tablet Take 1 tablet (5 mg total) by mouth every evening. 30 tablet 11    LORazepam (ATIVAN) 0.5 MG tablet Take 1 tablet (0.5 mg total) by mouth daily as needed for Anxiety. 30 tablet 0    naproxen sodium (ANAPROX) 550 MG tablet Take 1 tablet (550 mg total) by mouth every 8 (eight) hours. 30 tablet 2    ondansetron (ZOFRAN) 4 MG tablet Take 2 tablets (8 mg total) by mouth every 8 (eight) hours as needed for Nausea. 21 tablet 0    rosuvastatin (CRESTOR) 5 MG tablet Take 1 tablet (5 mg total) by mouth once daily. 90 tablet 3    scopolamine (TRANSDERM-SCOP) 1.3-1.5 mg (1 mg over 3 days) Place 1 patch onto the skin every 72 hours. 7 patch 1    turmeric-ging-olive-oreg-capry 100 mg-150 mg- 50 mg-150 mg Cap Take by mouth.      ubrogepant (UBRELVY) 100 mg tablet Take 1 tablet (100 mg total) by mouth as instructed for Migraine. May repeat once after 2 hours. No more than 2 tablets in 24 hours. 10 tablet 9    gabapentin (NEURONTIN) 300 MG capsule Take 1 capsule (300 mg total) by mouth 3 (three) times daily. 270 capsule 1    naloxone (NARCAN) 4 mg/actuation Spry 4mg by nasal route as needed for opioid overdose; may  "repeat every 2-3 minutes in alternating nostrils until medical help arrives. Call 911 2 each 0     Current Facility-Administered Medications on File Prior to Visit   Medication Dose Route Frequency Provider Last Rate Last Admin    onabotulinumtoxina injection 200 Units  200 Units Intramuscular Q90 Days Shadia Valdivia MD   200 Units at 09/24/24 1540       Social History:   Social History     Socioeconomic History    Marital status:    Tobacco Use    Smoking status: Never     Passive exposure: Never    Smokeless tobacco: Never   Substance and Sexual Activity    Alcohol use: Yes     Alcohol/week: 2.0 standard drinks of alcohol     Types: 2 Glasses of wine per week    Drug use: Never    Sexual activity: Yes     Partners: Male     Birth control/protection: Post-menopausal     Social Drivers of Health     Stress: Stress Concern Present (1/23/2020)    [employer] - Occupational Stress Questionnaire     Feeling of Stress : To some extent       REVIEW OF SYSTEMS:  Constitution: Negative. Negative for chills, fever and night sweats.   Cardiovascular: Negative for chest pain and syncope.   Respiratory: Negative for cough and shortness of breath.   Gastrointestinal: See HPI. Negative for nausea/vomiting. Negative for abdominal pain.  Genitourinary: See HPI. Negative for discoloration or dysuria.  Skin: Negative for dry skin, itching and rash.   Hematologic/Lymphatic: Negative for bleeding problem. Does not bruise/bleed easily.   Musculoskeletal: Negative for falls and muscle weakness.   Neurological: See HPI. No seizures.   Endocrine: Negative for polydipsia, polyphagia and polyuria.   Allergic/Immunologic: Negative for hives and persistent infections.     EXAM:  Ht 5' 5" (1.651 m)   Wt 70.8 kg (156 lb 1.4 oz)   [facility] 11/27/2013 Comment: [facility]  BMI 25.97 kg/m²     PHYSICAL EXAMINATION:    General: The patient is a 49 y.o. female in no apparent distress, the patient is oriented to person, " place and time.  Psych: Normal mood and affect  HEENT: Vision grossly intact, hearing intact to the spoken word.  Lungs: Respirations unlabored.  Gait: Normal station and gait, no difficulty with toe or heel walk.   Skin: Cervical skin and dorsal lumbar skin negative for rashes, lesions, hairy patches and surgical scars.    Range of motion: Cervical and lumbar range of motion is acceptable. There is mild tenderness to palpation of the paracervical muscles.  There is mild lumbar tenderness to palpation.  Spinal Balance: Global saggital and coronal spinal balance acceptable, no significant for scoliosis and kyphosis.  Musculoskeletal: No pain with the range of motion of the bilateral shoulders and elbows. Normal bulk and contour of the bilateral hands.  No pain with the range of motion of the bilateral hips. Mild bilateral trochanteric tenderness to palpation.  Vascular: Bilateral upper and lower extremities warm and well perfused, radial pulses 2+ bilaterally, dorsalis pedis pulses 2+ bilaterally.  Neurological: Normal strength and tone in all major motor groups in the bilateral upper and lower extremities. Normal sensation to light touch in the C5-T1 and L2-S1 dermatomes bilaterally.  Deep tendon reflexes symmetric 2+ in the bilateral upper and lower extremities.  Negative Inverted Radial Reflex and Martel's bilaterally. Negative Babinski bilaterally. Negative straight leg raise bilaterally.     IMAGING:   Today I personally reviewed AP, Lat and Flex/Ex  upright C-spine films that demonstrate mild DDD at C5-6.    AP, Lat and Flex/Ex upright lumbar spine films demonstrate very mild DDD at L5/S1.     Body mass index is 25.97 kg/m².    Hemoglobin A1C   Date Value Ref Range Status   11/17/2023 4.8 4.0 - 5.6 % Final     Comment:     ADA Screening Guidelines:  5.7-6.4%  Consistent with prediabetes  >or=6.5%  Consistent with diabetes    High levels of fetal hemoglobin interfere with the HbA1C  assay. Heterozygous  hemoglobin variants (HbS, HgC, etc)do  not significantly interfere with this assay.   However, presence of multiple variants may affect accuracy.           ASSESSMENT/PLAN:    Ksenia was seen today for neck pain, back pain and low-back pain.    Diagnoses and all orders for this visit:    DDD (degenerative disc disease), cervical  -     methocarbamoL (ROBAXIN) 500 MG Tab; Take 1-2 tablets (500-1,000 mg total) by mouth 3 (three) times daily as needed (muscle tension).  -     Ambulatory referral/consult to Physical/Occupational Therapy; Future  -     meloxicam (MOBIC) 15 MG tablet; Take 1 tablet (15 mg total) by mouth once daily.    Cervical radiculopathy  -     methocarbamoL (ROBAXIN) 500 MG Tab; Take 1-2 tablets (500-1,000 mg total) by mouth 3 (three) times daily as needed (muscle tension).  -     Ambulatory referral/consult to Physical/Occupational Therapy; Future  -     meloxicam (MOBIC) 15 MG tablet; Take 1 tablet (15 mg total) by mouth once daily.      Today we discussed at length all of the different treatment options including anti-inflammatories, acetaminophen, rest, ice, heat, physical therapy including strengthening and stretching exercises, home exercises, ROM, aerobic conditioning, aqua therapy, other modalities including ultrasound, massage, and dry needling, epidural steroid injections and finally surgical intervention.  PT orders placed. Follow up in 3 months.

## 2024-10-18 ENCOUNTER — OFFICE VISIT (OUTPATIENT)
Dept: ORTHOPEDICS | Facility: CLINIC | Age: 49
End: 2024-10-18
Payer: COMMERCIAL

## 2024-10-18 ENCOUNTER — HOSPITAL ENCOUNTER (OUTPATIENT)
Dept: RADIOLOGY | Facility: HOSPITAL | Age: 49
Discharge: HOME | End: 2024-10-18
Attending: REGISTERED NURSE
Payer: COMMERCIAL

## 2024-10-18 VITALS — BODY MASS INDEX: 26 KG/M2 | HEIGHT: 65 IN | WEIGHT: 156.06 LBS

## 2024-10-18 DIAGNOSIS — M50.30 DDD (DEGENERATIVE DISC DISEASE), CERVICAL: Primary | ICD-10-CM

## 2024-10-18 DIAGNOSIS — M50.30 DDD (DEGENERATIVE DISC DISEASE), CERVICAL: ICD-10-CM

## 2024-10-18 DIAGNOSIS — M54.12 CERVICAL RADICULOPATHY: ICD-10-CM

## 2024-10-18 PROCEDURE — 72050 X-RAY EXAM NECK SPINE 4/5VWS: CPT | Mod: TC

## 2024-10-18 PROCEDURE — 72110 X-RAY EXAM L-2 SPINE 4/>VWS: CPT | Mod: TC

## 2024-10-18 PROCEDURE — 99999 PR PBB SHADOW E&M-EST. PATIENT-LVL IV: CPT | Mod: PBBFAC,,, | Performed by: REGISTERED NURSE

## 2024-10-18 PROCEDURE — 72050 X-RAY EXAM NECK SPINE 4/5VWS: CPT | Mod: 26,,, | Performed by: RADIOLOGY

## 2024-10-18 PROCEDURE — 72110 X-RAY EXAM L-2 SPINE 4/>VWS: CPT | Mod: 26,,, | Performed by: RADIOLOGY

## 2024-10-18 RX ORDER — MELOXICAM 15 MG/1
15 TABLET ORAL DAILY
Qty: 14 TABLET | Refills: 0 | Status: SHIPPED | OUTPATIENT
Start: 2024-10-18

## 2024-10-18 RX ORDER — METHOCARBAMOL 500 MG/1
500-1000 TABLET, FILM COATED ORAL 3 TIMES DAILY PRN
Qty: 60 TABLET | Refills: 1 | Status: SHIPPED | OUTPATIENT
Start: 2024-10-18 | End: 2024-11-07

## 2024-10-18 RX ORDER — CALCIUM CARBONATE 200(500)MG
1 TABLET,CHEWABLE ORAL
COMMUNITY

## 2024-10-18 RX ORDER — ASCORBIC ACID 500 MG
500 TABLET ORAL
COMMUNITY

## 2024-10-22 PROBLEM — M53.82: Status: ACTIVE | Noted: 2024-10-22

## 2024-10-22 PROBLEM — M53.84: Status: ACTIVE | Noted: 2024-10-22

## 2024-10-22 PROBLEM — M53.82: Status: RESOLVED | Noted: 2024-10-22 | Resolved: 2024-10-22

## 2024-10-22 PROBLEM — M72.2 PLANTAR FASCIITIS OF RIGHT FOOT: Status: RESOLVED | Noted: 2023-10-11 | Resolved: 2024-10-22

## 2024-10-22 PROBLEM — M21.861 ACQUIRED POSTERIOR EQUINUS OF RIGHT LOWER EXTREMITY: Status: RESOLVED | Noted: 2023-10-11 | Resolved: 2024-10-22

## 2024-10-22 PROBLEM — Z74.09 IMPAIRED FUNCTIONAL MOBILITY AND ACTIVITY TOLERANCE: Status: ACTIVE | Noted: 2024-10-22

## 2024-10-28 ENCOUNTER — HOSPITAL ENCOUNTER (OUTPATIENT)
Dept: RADIOLOGY | Facility: HOSPITAL | Age: 49
Discharge: HOME | End: 2024-10-28
Attending: NURSE PRACTITIONER
Payer: COMMERCIAL

## 2024-10-28 DIAGNOSIS — Z12.39 BREAST CANCER SCREENING, HIGH RISK PATIENT: ICD-10-CM

## 2024-10-28 DIAGNOSIS — C77.9 SECONDARY ADENOCARCINOMA OF LYMPH NODE: ICD-10-CM

## 2024-10-28 DIAGNOSIS — Z17.0 MALIGNANT NEOPLASM OF UPPER-INNER QUADRANT OF LEFT BREAST IN FEMALE, ESTROGEN RECEPTOR POSITIVE: ICD-10-CM

## 2024-10-28 DIAGNOSIS — Z80.3 FAMILY HISTORY OF BREAST CANCER: ICD-10-CM

## 2024-10-28 DIAGNOSIS — Z79.811 USE OF ANASTROZOLE: ICD-10-CM

## 2024-10-28 DIAGNOSIS — C50.212 MALIGNANT NEOPLASM OF UPPER-INNER QUADRANT OF LEFT BREAST IN FEMALE, ESTROGEN RECEPTOR POSITIVE: ICD-10-CM

## 2024-10-28 PROCEDURE — 77049 MRI BREAST C-+ W/CAD BI: CPT | Mod: 26,,, | Performed by: RADIOLOGY

## 2024-10-28 PROCEDURE — 77049 MRI BREAST C-+ W/CAD BI: CPT | Mod: TC

## 2024-10-28 PROCEDURE — A9577 INJ MULTIHANCE: HCPCS | Performed by: NURSE PRACTITIONER

## 2024-10-28 PROCEDURE — 25500020 PHARM REV CODE 255: Performed by: NURSE PRACTITIONER

## 2024-10-28 RX ADMIN — GADOBENATE DIMEGLUMINE 15 ML: 529 INJECTION, SOLUTION INTRAVENOUS at 05:10

## 2024-12-03 ENCOUNTER — OFFICE VISIT (OUTPATIENT)
Dept: PRIMARY CARE CLINIC | Facility: CLINIC | Age: 49
End: 2024-12-03
Payer: COMMERCIAL

## 2024-12-03 ENCOUNTER — LAB VISIT (OUTPATIENT)
Dept: LAB | Facility: HOSPITAL | Age: 49
End: 2024-12-03
Attending: INTERNAL MEDICINE
Payer: COMMERCIAL

## 2024-12-03 VITALS
WEIGHT: 154.56 LBS | DIASTOLIC BLOOD PRESSURE: 78 MMHG | OXYGEN SATURATION: 99 % | HEART RATE: 84 BPM | HEIGHT: 65 IN | SYSTOLIC BLOOD PRESSURE: 128 MMHG | BODY MASS INDEX: 25.75 KG/M2

## 2024-12-03 DIAGNOSIS — R74.8 ELEVATED LIVER ENZYMES: ICD-10-CM

## 2024-12-03 DIAGNOSIS — E78.5 HYPERLIPIDEMIA, UNSPECIFIED HYPERLIPIDEMIA TYPE: ICD-10-CM

## 2024-12-03 DIAGNOSIS — F41.1 GENERALIZED ANXIETY DISORDER: ICD-10-CM

## 2024-12-03 DIAGNOSIS — Z00.00 WELLNESS EXAMINATION: ICD-10-CM

## 2024-12-03 DIAGNOSIS — K29.70 GASTRITIS, PRESENCE OF BLEEDING UNSPECIFIED, UNSPECIFIED CHRONICITY, UNSPECIFIED GASTRITIS TYPE: ICD-10-CM

## 2024-12-03 DIAGNOSIS — Z00.00 WELLNESS EXAMINATION: Primary | ICD-10-CM

## 2024-12-03 DIAGNOSIS — I10 HYPERTENSION, UNSPECIFIED TYPE: ICD-10-CM

## 2024-12-03 DIAGNOSIS — M85.89 OSTEOPENIA OF MULTIPLE SITES: ICD-10-CM

## 2024-12-03 LAB
ALBUMIN SERPL BCP-MCNC: 4.4 G/DL (ref 3.5–5.2)
ALP SERPL-CCNC: 71 U/L (ref 40–150)
ALT SERPL W/O P-5'-P-CCNC: 54 U/L (ref 10–44)
AST SERPL-CCNC: 29 U/L (ref 10–40)
BILIRUB DIRECT SERPL-MCNC: 0.3 MG/DL (ref 0.1–0.3)
BILIRUB SERPL-MCNC: 0.8 MG/DL (ref 0.1–1)
CHOLEST SERPL-MCNC: 164 MG/DL (ref 120–199)
CHOLEST/HDLC SERPL: 2.7 {RATIO} (ref 2–5)
HDLC SERPL-MCNC: 60 MG/DL (ref 40–75)
HDLC SERPL: 36.6 % (ref 20–50)
LDLC SERPL CALC-MCNC: 71 MG/DL (ref 63–159)
NONHDLC SERPL-MCNC: 104 MG/DL
PROT SERPL-MCNC: 7.6 G/DL (ref 6–8.4)
TRIGL SERPL-MCNC: 165 MG/DL (ref 30–150)

## 2024-12-03 PROCEDURE — 36415 COLL VENOUS BLD VENIPUNCTURE: CPT | Performed by: INTERNAL MEDICINE

## 2024-12-03 PROCEDURE — 3074F SYST BP LT 130 MM HG: CPT | Mod: CPTII,S$GLB,, | Performed by: INTERNAL MEDICINE

## 2024-12-03 PROCEDURE — 80061 LIPID PANEL: CPT | Performed by: INTERNAL MEDICINE

## 2024-12-03 PROCEDURE — 99999 PR PBB SHADOW E&M-EST. PATIENT-LVL IV: CPT | Mod: PBBFAC,,, | Performed by: INTERNAL MEDICINE

## 2024-12-03 PROCEDURE — 1159F MED LIST DOCD IN RCRD: CPT | Mod: CPTII,S$GLB,, | Performed by: INTERNAL MEDICINE

## 2024-12-03 PROCEDURE — 4010F ACE/ARB THERAPY RXD/TAKEN: CPT | Mod: CPTII,S$GLB,, | Performed by: INTERNAL MEDICINE

## 2024-12-03 PROCEDURE — 3078F DIAST BP <80 MM HG: CPT | Mod: CPTII,S$GLB,, | Performed by: INTERNAL MEDICINE

## 2024-12-03 PROCEDURE — 3008F BODY MASS INDEX DOCD: CPT | Mod: CPTII,S$GLB,, | Performed by: INTERNAL MEDICINE

## 2024-12-03 PROCEDURE — 99396 PREV VISIT EST AGE 40-64: CPT | Mod: S$GLB,,, | Performed by: INTERNAL MEDICINE

## 2024-12-03 PROCEDURE — 80076 HEPATIC FUNCTION PANEL: CPT | Performed by: INTERNAL MEDICINE

## 2024-12-03 RX ORDER — FLUOXETINE HYDROCHLORIDE 40 MG/1
40 CAPSULE ORAL EVERY MORNING
Qty: 90 CAPSULE | Refills: 3 | Status: SHIPPED | OUTPATIENT
Start: 2024-12-03

## 2024-12-03 RX ORDER — ALPRAZOLAM 0.25 MG/1
0.25 TABLET ORAL 2 TIMES DAILY PRN
Qty: 60 TABLET | Refills: 0 | Status: SHIPPED | OUTPATIENT
Start: 2024-12-03 | End: 2025-01-04

## 2024-12-03 RX ORDER — BENAZEPRIL HYDROCHLORIDE AND HYDROCHLOROTHIAZIDE 10; 12.5 MG/1; MG/1
1 TABLET ORAL DAILY
Qty: 90 TABLET | Refills: 3 | Status: SHIPPED | OUTPATIENT
Start: 2024-12-03 | End: 2025-12-03

## 2024-12-03 RX ORDER — ROSUVASTATIN CALCIUM 5 MG/1
5 TABLET, COATED ORAL DAILY
Qty: 90 TABLET | Refills: 3 | Status: SHIPPED | OUTPATIENT
Start: 2024-12-03 | End: 2025-12-03

## 2024-12-03 RX ORDER — ONDANSETRON 8 MG/1
8 TABLET, ORALLY DISINTEGRATING ORAL 2 TIMES DAILY
Qty: 30 TABLET | Refills: 3 | Status: SHIPPED | OUTPATIENT
Start: 2024-12-03

## 2024-12-03 NOTE — PROGRESS NOTES
[facility] Note    Chief Complaint    No chief complaint on file.    History of Present Illness      Ksenia Arce is a 49 y.o. female who presents today for chief complaint annual feels well .     HPI   Annual feels well due for lipids  Interval hx  CMP with heme onc in 8.24 shows abnl liver enzymes   In 3.24 saw neuro Shea for HA and abnl CNS imaging, had MRI, MRA. MRV brain in June - no AVM,. She is getting botox for HA   5.24 HO NP Alker ref to accupunctire 2/2 arthralgia due to anastrazole   5.24 ENT Tauzin  in office scope and tx of nasal polyp. She is on beclomethasone and xyzal, considering removal of polyp in surgery   Spine ortho for neck and back pain - she is in PT     Chronic hx     Osteopenia on Prolia DEXA 5.24- prolia at [facility]     Saw Neuro Dr Valdivia changed her migraine meds she in now on ubrelvy abortive  and qulipta ppx, sympotms are much ebtter, she is doing well. Has fioricet for breakthrough     She enrolled in dig HLD, myopathy on lipitor, LD uncontrolled, resuem statin with crestor. She had a ct calcium score in sept. I communicated with Dr Quintana about this and he agrees as well mediterranean diet and exercise. 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    Hyperlipidemia 01/31/2022    Diarrhea 09/17/2021    Advice given about COVID-19 virus infection 01/28/2021    Psoriasis 07/29/2020    Malignant neoplasm of left breast 01/23/2020     lobualr breast cancer of L breast dx in 5/2016 found on BSE, felt mass, had us and MMG was told was cyst but 2/2 pain sought eval by Dr surgeon Sr Lopez who did bx  -had mastectomy on L side and LN dissection , was in 6/9, chemo mastectomy radiation, taxol and doxirubicin, radiation x36, completed in 5/17, has been on arimidex since then, will beon for 10 total   -gets mmg and us q6 mos [facility]  Onc Julia Armstrong - [facility]   Had brca testing which was negative, er positive, her2 neg         Migraines 01/23/2020    Generalized anxiety disorder 01/23/2020     On daily prozac, sees adam flores, uses prn ativan 2-3x a month       Herpes simplex 01/23/2020     Takes valcyte prn outbreak, oral lesions      History of total abdominal hysterectomy 01/23/2020     S/p with [facility]          Health Maintenance   Topic Date Due    Shingles Vaccine (1 of 2) Never done    COVID-19 Vaccine (3 - Moderna risk series) 05/04/2021    Pneumococcal Vaccines (Age 0-64) (2 of 2 - PCV) 07/29/2021    Mammogram  04/30/2025    TETANUS VACCINE  06/01/2025    Hemoglobin A1c (Diabetic Prevention Screening)  11/17/2026    Lipid Panel  12/03/2029    Colorectal Cancer Screening  09/17/2031    RSV Vaccine (Age 60+ and Pregnant patients) (1 - 1-dose 75+ series) 10/17/2048    Hepatitis C Screening   Completed    Influenza Vaccine  Completed    HIV Screening  Completed       Past Medical History:   Diagnosis Date    Breast cancer 05/2016    ER+ ILC    History of antineoplastic chemotherapy 2016    6/30/2016-11/16/2016    History of therapeutic radiation 2017    left chest, completed 5/10/17, dose 9500 cGy    Mental disorder     Migraine headache     Neuropathy        Past Surgical History:   Procedure Laterality Date    BREAST CAPSULECTOMY      BREAST RECONSTRUCTION      COLONOSCOPY N/A 09/17/2021    Procedure: COLONOSCOPY;  Surgeon: Diony Montes MD;  Location: [facility];  Service: Endoscopy;  Laterality: N/A;    COSMETIC SURGERY  [id number]    Reconstruction    HYSTERECTOMY  2019    MASTECTOMY Left 12/26/2016    TONSILLECTOMY      TOTAL ABDOMINAL HYSTERECTOMY W/ BILATERAL SALPINGOOPHORECTOMY  2018       family history includes Breast cancer (age of onset: 59) in her maternal cousin; Breast cancer (age of onset: 72) in her paternal grandmother; Clotting disorder in her mother; Colon polyps in her mother; Coronary artery disease in her mother; Crohn's disease in her mother; Heart disease in her father; Hypertension in her father and mother.    Social History     Tobacco Use    Smoking status: Never     Passive exposure: Never    Smokeless tobacco: Never   Substance Use Topics    Alcohol use: Yes     Alcohol/week: 2.0 standard drinks of alcohol     Types: 2 Glasses of wine per week    Drug use: Never       Review of Systems   Constitutional:  Negative for chills, fever, malaise/fatigue and weight loss.   Respiratory:  Negative for cough, sputum production, shortness of breath and wheezing.    Cardiovascular:  Negative for chest pain, palpitations, orthopnea and leg swelling.   Gastrointestinal:  Negative for constipation, diarrhea, nausea and vomiting.   Genitourinary:  Negative for dysuria, frequency, hematuria and urgency.        Outpatient Encounter Medications as of 12/3/2024   Medication Sig Dispense  Refill    anastrozole (ARIMIDEX) 1 mg Tab Take 1 tablet (1 mg total) by mouth once daily. 90 tablet 6    ascorbic acid, vitamin C, (VITAMIN C) 500 MG tablet Take 500 mg by mouth.      atogepant (QULIPTA) 60 mg Tab Take 1 tablet (60 mg) by mouth once daily. 30 tablet 9    azelastine (ASTELIN) 137 mcg (0.1 %) nasal spray 1 spray (137 mcg total) by Nasal route 2 (two) times daily. 30 mL 3    beclomethasone dipropionate (QNASL) 80 mcg/actuation HFAA Use 2 sprays (160 mcg) by Nasal route once daily. 10.6 g 6    butalbitaL-acetaminop-caf-cod (FIORICET WITH CODEINE) -39-30 mg Cap Take 1 capsule by mouth every 6 hours as needed for migraine 12 capsule 0    calcium carbonate (TUMS) 200 mg calcium (500 mg) chewable tablet Take 1 tablet by mouth.      CALCIUM GLUCONATE ORAL Take by mouth.      levocetirizine (XYZAL) 5 MG tablet Take 1 tablet (5 mg total) by mouth every evening. 30 tablet 11    meloxicam (MOBIC) 15 MG tablet Take 1 tablet (15 mg total) by mouth once daily. 14 tablet 0    naproxen sodium (ANAPROX) 550 MG tablet Take 1 tablet (550 mg total) by mouth every 8 (eight) hours. 30 tablet 2    ondansetron (ZOFRAN) 4 MG tablet Take 2 tablets (8 mg total) by mouth every 8 (eight) hours as needed for Nausea. 21 tablet 0    scopolamine (TRANSDERM-SCOP) 1.3-1.5 mg (1 mg over 3 days) Place 1 patch onto the skin every 72 hours. 7 patch 1    turmeric-ging-olive-oreg-capry 100 mg-150 mg- 50 mg-150 mg Cap Take by mouth.      ubrogepant (UBRELVY) 100 mg tablet Take 1 tablet (100 mg total) by mouth as instructed for Migraine. May repeat once after 2 hours. No more than 2 tablets in 24 hours. 10 tablet 9    [DISCONTINUED] benazepril-hydrochlorthiazide (LOTENSIN HCT) 10-12.5 mg Tab Take 1 tablet by mouth once daily. 90 tablet 3    [DISCONTINUED] FLUoxetine 40 MG capsule Take 1 capsule (40 mg total) by mouth every morning. 90 capsule 3    [DISCONTINUED] LORazepam (ATIVAN) 0.5 MG tablet Take 1 tablet (0.5 mg total) by mouth daily  "as needed for Anxiety. 30 tablet 0    [DISCONTINUED] rosuvastatin (CRESTOR) 5 MG tablet Take 1 tablet (5 mg total) by mouth once daily. 90 tablet 3    ALPRAZolam (XANAX) 0.25 MG tablet Take 1 tablet (0.25 mg total) by mouth 2 (two) times daily as needed for Anxiety. 60 tablet 0    benazepril-hydrochlorthiazide (LOTENSIN HCT) 10-12.5 mg Tab Take 1 tablet by mouth once daily. 90 tablet 3    FLUoxetine 40 MG capsule Take 1 capsule (40 mg total) by mouth every morning. 90 capsule 3    gabapentin (NEURONTIN) 300 MG capsule Take 1 capsule (300 mg total) by mouth 3 (three) times daily. 270 capsule 1    [] methocarbamoL (ROBAXIN) 500 MG Tab Take 1-2 tablets (500-1,000 mg total) by mouth 3 (three) times daily as needed (muscle tension). 60 tablet 1    ondansetron (ZOFRAN-ODT) 8 MG TbDL Take 1 tablet (8 mg total) by mouth 2 (two) times daily. 30 tablet 3    rosuvastatin (CRESTOR) 5 MG tablet Take 1 tablet (5 mg total) by mouth once daily. 90 tablet 3    [DISCONTINUED] naloxone (NARCAN) 4 mg/actuation Spry 4mg by nasal route as needed for opioid overdose; may repeat every 2-3 minutes in alternating nostrils until medical help arrives. Call 911 2 each 0     Facility-Administered Encounter Medications as of 12/3/2024   Medication Dose Route Frequency Provider Last Rate Last Admin    onabotulinumtoxina injection 200 Units  200 Units Intramuscular Q90 Days Shadia Valdivia MD   200 Units at 24 1540        Review of patient's allergies indicates:  No Known Allergies        Physical Exam      Vital Signs  Pulse: 84  SpO2: 99 %  BP: 128/78  BP Location: Right arm  Patient Position: Sitting  Height and Weight  Height: 5' 5" (165.1 cm)  Weight: 70.1 kg (154 lb 8.7 oz)  BSA (Calculated - sq m): 1.79 sq meters  BMI (Calculated): 25.7  Weight in (lb) to have BMI = 25: 149.9]    Physical Exam  Vitals reviewed.   Constitutional:       General: She is not in acute distress.     Appearance: She is well-developed. She is not " "diaphoretic.   HENT:      Head: Normocephalic and atraumatic.      Right Ear: External ear normal.      Left Ear: External ear normal.      Nose: Nose normal.   Eyes:      General:         Right eye: No discharge.         Left eye: No discharge.      Conjunctiva/sclera: Conjunctivae normal.   Cardiovascular:      Rate and Rhythm: Normal rate and regular rhythm.      Heart sounds: Normal heart sounds.   Pulmonary:      Effort: Pulmonary effort is normal. No respiratory distress.      Breath sounds: Normal breath sounds.   Musculoskeletal:         General: Normal range of motion.      Cervical back: Normal range of motion.   Skin:     Coloration: Skin is not pale.      Findings: No rash.   Neurological:      Mental Status: She is alert and oriented to person, place, and time.   Psychiatric:         Behavior: Behavior normal.         Thought Content: Thought content normal.          Laboratory:  CBC:  No results for input(s): "WBC", "RBC", "HGB", "HCT", "PLT", "MCV", "MCH", "MCHC" in the last 2160 hours.  CMP:  Recent Labs   Lab Result Units 12/03/24  1420   Albumin g/dL 4.4   Total Protein g/dL 7.6   Alkaline Phosphatase U/L 71   ALT U/L 54*   AST U/L 29   Total Bilirubin mg/dL 0.8     URINALYSIS:  No results for input(s): "COLORU", "CLARITYU", "SPECGRAV", "PHUR", "PROTEINUA", "GLUCOSEU", "BILIRUBINCON", "BLOODU", "WBCU", "RBCU", "BACTERIA", "MUCUS", "NITRITE", "LEUKOCYTESUR", "UROBILINOGEN", "HYALINECASTS" in the last 2160 hours.   LIPIDS:  Recent Labs   Lab Result Units 12/03/24  1420   HDL mg/dL 60   Cholesterol mg/dL 164   Triglycerides mg/dL 165*   LDL Cholesterol mg/dL 71.0   HDL/Cholesterol Ratio % 36.6   Non-HDL Cholesterol mg/dL 104   Total Cholesterol/HDL Ratio  2.7     TSH:  No results for input(s): "TSH" in the last 2160 hours.  A1C:  No results for input(s): "HGBA1C" in the last 2160 hours.    Radiology:      Assessment/Plan     Ksenia Arce is a 49 y.o.female with:    1. Wellness examination  -     " Lipid Panel; Future; Expected date: 12/03/2024  -     HEPATIC FUNCTION PANEL; Future; Expected date: 12/03/2024    2. Elevated liver enzymes  -     HEPATIC FUNCTION PANEL; Future; Expected date: 12/03/2024    3. Osteopenia of multiple sites  Assessment & Plan:  BMD now normal on last dexa, however since fracture risk increases on in the post prolia period and I suspect we should bridge her with a bisphosphonate but will get a formal endocrine opinion. She is amenable     Orders:  -     Ambulatory referral/consult to Endocrinology; Future; Expected date: 12/10/2024    4. Hypertension, unspecified type  -     benazepril-hydrochlorthiazide (LOTENSIN HCT) 10-12.5 mg Tab; Take 1 tablet by mouth once daily.  Dispense: 90 tablet; Refill: 3    5. Generalized anxiety disorder  Overview:  On daily prozac, sees adam mark, uses prn ativan 2-3x a month     Orders:  -     FLUoxetine 40 MG capsule; Take 1 capsule (40 mg total) by mouth every morning.  Dispense: 90 capsule; Refill: 3  -     ALPRAZolam (XANAX) 0.25 MG tablet; Take 1 tablet (0.25 mg total) by mouth 2 (two) times daily as needed for Anxiety.  Dispense: 60 tablet; Refill: 0    6. Hyperlipidemia, unspecified hyperlipidemia type  -     rosuvastatin (CRESTOR) 5 MG tablet; Take 1 tablet (5 mg total) by mouth once daily.  Dispense: 90 tablet; Refill: 3    7. Gastritis, presence of bleeding unspecified, unspecified chronicity, unspecified gastritis type  -     ondansetron (ZOFRAN-ODT) 8 MG TbDL; Take 1 tablet (8 mg total) by mouth 2 (two) times daily.  Dispense: 30 tablet; Refill: 3         Use of the [email or web address] Patient Portal discussed and encouraged during today's visit  -Continue current medications and maintain follow up with specialists.    Future Appointments   Date Time Provider Department Center   12/17/2024 10:00 AM Shadia Valdivia MD [facility]  [facility]   12/27/2024  9:20 AM Susy Mckeon MD [facility]   1/17/2025  1:00 PM SARAH Talley  [facility]   4/2/2025 11:00 AM Radha Joaquin NP [facility]   5/2/2025  3:00 PM MORENA LACKEYO2 [facility] Natalio Felipe MD  12/5/2024 1:10 PM    Primary Care Internal Medicine

## 2024-12-03 NOTE — ASSESSMENT & PLAN NOTE
BMD now normal on last dexa, however since fracture risk increases on in the post prolia period and I suspect we should bridge her with a bisphosphonate but will get a formal endocrine opinion. She is amenable

## 2024-12-13 ENCOUNTER — PATIENT MESSAGE (OUTPATIENT)
Dept: OTOLARYNGOLOGY | Facility: CLINIC | Age: 49
End: 2024-12-13
Payer: COMMERCIAL

## 2024-12-17 ENCOUNTER — PROCEDURE VISIT (OUTPATIENT)
Dept: NEUROLOGY | Facility: CLINIC | Age: 49
End: 2024-12-17
Payer: COMMERCIAL

## 2024-12-17 VITALS
HEIGHT: 65 IN | BODY MASS INDEX: 25.34 KG/M2 | SYSTOLIC BLOOD PRESSURE: 133 MMHG | HEART RATE: 72 BPM | WEIGHT: 152.13 LBS | DIASTOLIC BLOOD PRESSURE: 95 MMHG

## 2024-12-17 DIAGNOSIS — G43.709 CHRONIC MIGRAINE W/O AURA W/O STATUS MIGRAINOSUS, NOT INTRACTABLE: Primary | ICD-10-CM

## 2024-12-17 PROCEDURE — 64615 CHEMODENERV MUSC MIGRAINE: CPT | Mod: S$GLB,,, | Performed by: PSYCHIATRY & NEUROLOGY

## 2024-12-17 RX ORDER — GABAPENTIN 300 MG/1
300 CAPSULE ORAL 3 TIMES DAILY
Qty: 270 CAPSULE | Refills: 1 | Status: SHIPPED | OUTPATIENT
Start: 2024-12-17 | End: 2025-06-15

## 2024-12-17 RX ORDER — BUTALBITAL, ACETAMINOPHEN, CAFFEINE AND CODEINE PHOSPHATE 300; 50; 40; 30 MG/1; MG/1; MG/1; MG/1
CAPSULE ORAL
Qty: 12 CAPSULE | Refills: 0 | Status: SHIPPED | OUTPATIENT
Start: 2024-12-17

## 2024-12-17 NOTE — PROCEDURES
Botulinum Injection  Location: Head/Face/Jaw    Date/Time: 12/17/2024 10:00 AM    Performed by: Shadia Valdivia MD  Authorized by: Shadia Valdivia MD      Universal protocol:      Patient identity confirmed:  Verbally with patient    Procedure details:      Diluted by:  Preservative free saline     Laterality: Bilateral     Toxin (Brand):  OnaBoNT-A (Botox)     Concentration (u/mL):  2:1     Total units available:  200       Right frontalis:  10 units divided amongst 2 site(s)     Left frontalis:  10 units divided amongst 2 site(s)     Right :  5 units divided amongst 1 site(s)     Left :  5 units divided amongst 1 site(s)     Procerus (midline):  5 units divided amongst 1 site(s)     Right occipitalis:  20 units divided amongst 4 site(s)     Left occipitalis:  20 units divided amongst 4 site(s)     Right cervical paraspinal:  10 units divided amongst 2 site(s)     Left cervical paraspinal:  10 units divided amongst 2 site(s)     Right trapezius:  25 units divided amongst 2 site(s)     Left trapezius:  25 units divided amongst 2 site(s)     Right temporalis:  20 units divided amongst 4 site(s)     Left temporalis:  20 units divided amongst 4 site(s)         Total units injected:  185     Total units wasted:  15  Medications: 200 Units onabotulinumtoxina 200 unit    Post-procedure details:      Patient tolerance of procedure:  Tolerated well, no immediate complications  Comments: Doing very well. Migraine flare for the past week but prior to that had almost none.

## 2025-01-14 DIAGNOSIS — C50.212 MALIGNANT NEOPLASM OF UPPER-INNER QUADRANT OF LEFT FEMALE BREAST, UNSPECIFIED ESTROGEN RECEPTOR STATUS: ICD-10-CM

## 2025-01-14 RX ORDER — ANASTROZOLE 1 MG/1
1 TABLET ORAL DAILY
Qty: 90 TABLET | Refills: 6 | Status: SHIPPED | OUTPATIENT
Start: 2025-01-14

## 2025-02-12 ENCOUNTER — TELEPHONE (OUTPATIENT)
Dept: PRIMARY CARE CLINIC | Facility: CLINIC | Age: 50
End: 2025-02-12
Payer: COMMERCIAL

## 2025-02-12 NOTE — TELEPHONE ENCOUNTER
AG transfer- can you please schedule with  on 08/11 at 1:40 to [facility]? Thank you!

## 2025-02-12 NOTE — TELEPHONE ENCOUNTER
----- Message from Meghanishaan sent at 2/12/2025 11:41 AM CST -----  Contact: self [phone]  Would like to receive medical advice.    Would they like a call back or a response via [facility]:  call back or portal     Additional information:  Calling to speak with the office about getting a np est care appt made for when the provider comes back into the office.

## 2025-02-18 ENCOUNTER — OFFICE VISIT (OUTPATIENT)
Dept: HEMATOLOGY/ONCOLOGY | Facility: CLINIC | Age: 50
End: 2025-02-18
Payer: COMMERCIAL

## 2025-02-18 VITALS
HEART RATE: 64 BPM | OXYGEN SATURATION: 99 % | BODY MASS INDEX: 25.22 KG/M2 | TEMPERATURE: 98 F | SYSTOLIC BLOOD PRESSURE: 127 MMHG | HEIGHT: 65 IN | RESPIRATION RATE: 20 BRPM | WEIGHT: 151.38 LBS | DIASTOLIC BLOOD PRESSURE: 88 MMHG

## 2025-02-18 DIAGNOSIS — Z79.811 USE OF ANASTROZOLE: ICD-10-CM

## 2025-02-18 DIAGNOSIS — C50.212 MALIGNANT NEOPLASM OF UPPER-INNER QUADRANT OF LEFT FEMALE BREAST, UNSPECIFIED ESTROGEN RECEPTOR STATUS: ICD-10-CM

## 2025-02-18 DIAGNOSIS — C77.9 SECONDARY ADENOCARCINOMA OF LYMPH NODE: ICD-10-CM

## 2025-02-18 DIAGNOSIS — K21.00 GASTROESOPHAGEAL REFLUX DISEASE WITH ESOPHAGITIS WITHOUT HEMORRHAGE: Primary | ICD-10-CM

## 2025-02-18 PROCEDURE — 3074F SYST BP LT 130 MM HG: CPT | Mod: CPTII,S$GLB,, | Performed by: STUDENT IN AN ORGANIZED HEALTH CARE EDUCATION/TRAINING PROGRAM

## 2025-02-18 PROCEDURE — 1159F MED LIST DOCD IN RCRD: CPT | Mod: CPTII,S$GLB,, | Performed by: STUDENT IN AN ORGANIZED HEALTH CARE EDUCATION/TRAINING PROGRAM

## 2025-02-18 PROCEDURE — G2211 COMPLEX E/M VISIT ADD ON: HCPCS | Mod: S$GLB,,, | Performed by: STUDENT IN AN ORGANIZED HEALTH CARE EDUCATION/TRAINING PROGRAM

## 2025-02-18 PROCEDURE — 3079F DIAST BP 80-89 MM HG: CPT | Mod: CPTII,S$GLB,, | Performed by: STUDENT IN AN ORGANIZED HEALTH CARE EDUCATION/TRAINING PROGRAM

## 2025-02-18 PROCEDURE — 3008F BODY MASS INDEX DOCD: CPT | Mod: CPTII,S$GLB,, | Performed by: STUDENT IN AN ORGANIZED HEALTH CARE EDUCATION/TRAINING PROGRAM

## 2025-02-18 PROCEDURE — 99999 PR PBB SHADOW E&M-EST. PATIENT-LVL V: CPT | Mod: PBBFAC,,, | Performed by: STUDENT IN AN ORGANIZED HEALTH CARE EDUCATION/TRAINING PROGRAM

## 2025-02-18 PROCEDURE — 99214 OFFICE O/P EST MOD 30 MIN: CPT | Mod: S$GLB,,, | Performed by: STUDENT IN AN ORGANIZED HEALTH CARE EDUCATION/TRAINING PROGRAM

## 2025-02-18 RX ORDER — ATOGEPANT 60 MG/1
60 TABLET ORAL DAILY
Qty: 30 TABLET | Refills: 9 | Status: SHIPPED | OUTPATIENT
Start: 2025-02-18

## 2025-02-18 NOTE — PROGRESS NOTES
"                                                           PROGRESS NOTE    Subjective:       Patient ID: Ksenia Arce   MRN: [id number]  : 1975    Chief Complaint: HR+ Left Breast cancer     History of Present Illness:   Ksenia Arce is a 49 y.o. female who woman with history of HR+ breast cancer. Her oncologic history is as follows:    -initially diagnosed with 2016 (Dr. Armstrong)- ILC of the inner quadrant of the L breast, [id number]. Stage IIIA. ER positive, KS positive, Her2 negative.   -completed neoadjuvant chemotherapy with ddAC-T  -underwent L mastectomy. Final pathology: residual disease in the breast and LN  -completed adjuvant radiation  -initially treatment with Lupron + anastrozole 2017. After GOPAL/BSO in  Lupron was discontinued  -previously following with Dr. Coles; last seen by her 2024. At that time was having several symptoms prompting imaging studies. CT CAP and bone scan showed HENRY. MRI brain showed single punctate hyperintensity, likely microhemorrhage vs venous vascular malformation.  Repeat MRI brain in 2024 was stable; did demonstrate a nasal polyp that she has since seen ENT about    FH: paternal grandmother had breast cancer in her 70s  Had genetic testing in  that was negative     Interval history:  Ms. Arce presents today for evaluation. Continues to tolerate anastrozole without difficulty. No hot flashes, vaginal dryness or joint pain. Denies breast changes or concerns. She does report recent GI symptoms including alternating constipation and diarrhea. She has also had increasing reflux symptoms which are new. Has not consistently taken ppi. Last colonoscopy 4 years ago.     ROS:   12pt ROS negative except as noted above     Objective:     Vitals:    25 0932   BP: 127/88   Pulse: 64   Resp: 20   Temp: 98.1 °F (36.7 °C)   TempSrc: Oral   SpO2: 99%   Weight: 68.6 kg (151 lb 5.5 oz)   Height: 5' 5" (1.651 m)   PainSc: 0-No pain     Wt Readings from Last 10 " Encounters:   02/18/25 68.6 kg (151 lb 5.5 oz)   12/17/24 69 kg (152 lb 1.6 oz)   12/03/24 70.1 kg (154 lb 8.7 oz)   10/18/24 70.8 kg (156 lb 1.4 oz)   09/24/24 70 kg (154 lb 4.8 oz)   08/21/24 69.3 kg (152 lb 12.5 oz)   08/02/24 68.3 kg (150 lb 9.2 oz)   05/29/24 69.4 kg (153 lb 1.8 oz)   05/17/24 69 kg (152 lb 1.9 oz)   04/09/24 67 kg (147 lb 11.3 oz)       Physical Examination:   ECOG 0   General: well appearing, in no apparent distress  HEENT: Normocephalic, EOMI, anicteric sclerae, MMM  Neck: supple, without cervical or supraclavicular lymphadenopathy.  Heart: well-perfused  Lungs: no increased wob  Breast: s/p L mastectomy w reconstruction, incisions well-healed. No Rt breast masses or LAD  Abdomen: Soft, nontender, nondistended  Extremities: No LE edema or joint effusion  Skin: warm, well-perfused, no rash  Neurologic: Alert and oriented x 4, normal speech and gait   Psychiatric: Conversing appropriately with providers throughout today's encounter.      Diagnostic Tests:  Reviewed recent labs, imaging and pathology.      Assessment/Plan:   Ms. Arce is a very pleasant 48yo woman with history of T2N2a HR+ L-sided breast cancer who presents today for follow up.    She is s/p na chemotherapy, L mastectomy, adjuvant radiation and remains on anastrozole since 12/2017 with good tolerance.      #HR+ breast cancer:  --plan to continue anastrozole for 10 years given LN involvement (SOT 12/2017)  --cont surveillance with alternating MMG and MRI.  Rt MMG 5/2025, MRI breast due 10/2025  --RTC 6mo    #AI related Arthralgias: stable   --cont to monitor      #Osteopenia of multiple site: resolved on most recent DEXA 5/2024- showed normal density  --will hold off on continuing prolia for now. Will re-evaluate with DEXA 5/2026 and can add back if needed      #Peripheral neuropathy due to chemotherapy: stable   --Continue gabapentin.     #Anxiety   --On Fluoxetine and lorazepam.     #HTN  --Continue antihypertenisves      #Reflux:  Constipation: encouraged her to try OTC ppi consistently and keep track of when her symptoms ovvur. Can also add in stool softener for intermittent constipation.   --will place referral to GI for evaluation in case symptoms do not improve     All questions were answered to her apparent satisfaction. Will see her back in 6 months or sooner should the need arise.      Route Chart for Scheduling  Med Onc Route Chart for Scheduling      Therapy Plan Information  DENOSUMAB (PROLIA) Q6M for Osteopenia of multiple sites, noted on 3/18/2022  Medications  denosumab (PROLIA) injection 60 mg  60 mg, Subcutaneous, Every 26 weeks      No therapy plan of the specified type found.    No therapy plan of the specified type found.        Total time of this visit, including time spent face to face with patient and/or via video/audio, and also in preparing for today's visit for MDM and documentation. (Medical Decision Making, including consideration of possible diagnoses, management options, complex medical record review, review of diagnostic tests and information, consideration and discussion of significant complications based on comorbidities, and discussion with providers involved with the care of the patient) 30 minutes. Greater than 50% was spent face to face with the patient counseling and coordinating care.      Gianna Mota MD

## 2025-03-11 ENCOUNTER — PROCEDURE VISIT (OUTPATIENT)
Dept: NEUROLOGY | Facility: CLINIC | Age: 50
End: 2025-03-11
Payer: COMMERCIAL

## 2025-03-11 VITALS
SYSTOLIC BLOOD PRESSURE: 121 MMHG | HEART RATE: 84 BPM | WEIGHT: 152.13 LBS | HEIGHT: 65 IN | DIASTOLIC BLOOD PRESSURE: 87 MMHG | BODY MASS INDEX: 25.34 KG/M2

## 2025-03-11 DIAGNOSIS — K29.70 GASTRITIS, PRESENCE OF BLEEDING UNSPECIFIED, UNSPECIFIED CHRONICITY, UNSPECIFIED GASTRITIS TYPE: ICD-10-CM

## 2025-03-11 DIAGNOSIS — Z87.898 H/O MOTION SICKNESS: Primary | ICD-10-CM

## 2025-03-11 DIAGNOSIS — G43.709 CHRONIC MIGRAINE W/O AURA W/O STATUS MIGRAINOSUS, NOT INTRACTABLE: Primary | ICD-10-CM

## 2025-03-11 RX ORDER — SCOPOLAMINE 1 MG/3D
1 PATCH, EXTENDED RELEASE TRANSDERMAL
Qty: 6 PATCH | Refills: 0 | Status: SHIPPED | OUTPATIENT
Start: 2025-03-11

## 2025-03-11 RX ORDER — UBROGEPANT 100 MG/1
100 TABLET ORAL SEE ADMIN INSTRUCTIONS
Qty: 48 TABLET | Refills: 3 | Status: SHIPPED | OUTPATIENT
Start: 2025-03-11 | End: 2026-03-11

## 2025-03-11 RX ORDER — BUTALBITAL, ACETAMINOPHEN, CAFFEINE AND CODEINE PHOSPHATE 300; 50; 40; 30 MG/1; MG/1; MG/1; MG/1
CAPSULE ORAL
Qty: 12 CAPSULE | Refills: 0 | Status: SHIPPED | OUTPATIENT
Start: 2025-03-11

## 2025-03-11 RX ORDER — ATOGEPANT 60 MG/1
60 TABLET ORAL DAILY
Qty: 90 TABLET | Refills: 3 | Status: SHIPPED | OUTPATIENT
Start: 2025-03-11

## 2025-03-11 RX ORDER — ONDANSETRON 8 MG/1
8 TABLET, ORALLY DISINTEGRATING ORAL 3 TIMES DAILY PRN
Qty: 30 TABLET | Refills: 3 | Status: SHIPPED | OUTPATIENT
Start: 2025-03-11

## 2025-03-11 NOTE — PROCEDURES
Botulinum Injection  Location: Head/Face/Jaw    Date/Time: 3/11/2025 9:20 AM    Performed by: Shadia Valdivia MD  Authorized by: Shadia Valdivia MD      Universal protocol:      Patient identity confirmed:  Verbally with patient    Procedure details:      Diluted by:  Preservative free saline     Laterality: Bilateral     Toxin (Brand):  OnaBoNT-A (Botox)     Concentration (u/mL):  2:1     Total units available:  200       Right frontalis:  10 units divided amongst 3 site(s)     Left frontalis:  10 units divided amongst 3 site(s)     Right :  5 units divided amongst 1 site(s)     Left :  5 units divided amongst 1 site(s)     Procerus (midline):  5 units divided amongst 1 site(s)     Right occipitalis:  15 units divided amongst 3 site(s)     Left occipitalis:  15 units divided amongst 3 site(s)     Right cervical paraspinal:  10 units divided amongst 2 site(s)     Left cervical paraspinal:  10 units divided amongst 2 site(s)     Right trapezius:  25 units divided amongst 2 site(s)     Left trapezius:  25 units divided amongst 2 site(s)     Right temporalis:  20 units divided amongst 4 site(s)     Left temporalis:  20 units divided amongst 4 site(s)         Total units injected:  175     Total units wasted:  25    Post-procedure details:      Patient tolerance of procedure:  Tolerated well, no immediate complications  Comments: Doing well. Going on vacation and needs RF's.

## 2025-03-18 ENCOUNTER — PATIENT MESSAGE (OUTPATIENT)
Dept: OTOLARYNGOLOGY | Facility: CLINIC | Age: 50
End: 2025-03-18
Payer: COMMERCIAL

## 2025-03-19 ENCOUNTER — OFFICE VISIT (OUTPATIENT)
Dept: OTOLARYNGOLOGY | Facility: CLINIC | Age: 50
End: 2025-03-19
Payer: COMMERCIAL

## 2025-03-19 ENCOUNTER — CLINICAL SUPPORT (OUTPATIENT)
Dept: OTOLARYNGOLOGY | Facility: CLINIC | Age: 50
End: 2025-03-19
Payer: COMMERCIAL

## 2025-03-19 VITALS
HEART RATE: 74 BPM | DIASTOLIC BLOOD PRESSURE: 87 MMHG | WEIGHT: 147.38 LBS | BODY MASS INDEX: 24.52 KG/M2 | SYSTOLIC BLOOD PRESSURE: 123 MMHG

## 2025-03-19 DIAGNOSIS — J30.9 CHRONIC ALLERGIC RHINITIS: Primary | Chronic | ICD-10-CM

## 2025-03-19 DIAGNOSIS — H69.93 ETD (EUSTACHIAN TUBE DYSFUNCTION), BILATERAL: Chronic | ICD-10-CM

## 2025-03-19 DIAGNOSIS — H93.8X3 EAR PRESSURE, BILATERAL: Chronic | ICD-10-CM

## 2025-03-19 DIAGNOSIS — H93.8X3 EAR PRESSURE, BILATERAL: Primary | ICD-10-CM

## 2025-03-19 PROCEDURE — 3074F SYST BP LT 130 MM HG: CPT | Mod: CPTII,S$GLB,, | Performed by: OTOLARYNGOLOGY

## 2025-03-19 PROCEDURE — 3008F BODY MASS INDEX DOCD: CPT | Mod: CPTII,S$GLB,, | Performed by: OTOLARYNGOLOGY

## 2025-03-19 PROCEDURE — 1159F MED LIST DOCD IN RCRD: CPT | Mod: CPTII,S$GLB,, | Performed by: OTOLARYNGOLOGY

## 2025-03-19 PROCEDURE — 99214 OFFICE O/P EST MOD 30 MIN: CPT | Mod: S$GLB,,, | Performed by: OTOLARYNGOLOGY

## 2025-03-19 PROCEDURE — 99999 PR PBB SHADOW E&M-EST. PATIENT-LVL I: CPT | Mod: PBBFAC,,, | Performed by: PHYSICIAN ASSISTANT

## 2025-03-19 PROCEDURE — 99999 PR PBB SHADOW E&M-EST. PATIENT-LVL III: CPT | Mod: PBBFAC,,, | Performed by: OTOLARYNGOLOGY

## 2025-03-19 PROCEDURE — 3079F DIAST BP 80-89 MM HG: CPT | Mod: CPTII,S$GLB,, | Performed by: OTOLARYNGOLOGY

## 2025-03-19 RX ORDER — METHYLPREDNISOLONE 4 MG/1
TABLET ORAL
Qty: 21 EACH | Refills: 0 | Status: SHIPPED | OUTPATIENT
Start: 2025-03-19

## 2025-03-19 NOTE — PROGRESS NOTES
"  Chief Complaint   Patient presents with    Ear Problem     Ear pressure (popping in ear for two weeks on and off )   .     HPI: Ksenia Arce is a 49 y.o. female who is self-referred for bilateral ear stuffiness",ear fullness and ear popping which has been present for 2 weeks.  She reports the symptoms have been are sudden in onset.  She has been experiencing mild nasal congestion, post nasal drip, rhinorrhea, and hearing loss.      Past Medical History:   Diagnosis Date    Breast cancer 05/2016    ER+ ILC    History of antineoplastic chemotherapy 2016    6/30/2016-11/16/2016    History of therapeutic radiation 2017    left chest, completed 5/10/17, dose 9500 cGy    Mental disorder     Migraine headache     Neuropathy      Social History[1]  Past Surgical History:   Procedure Laterality Date    BREAST CAPSULECTOMY      BREAST RECONSTRUCTION      COLONOSCOPY N/A 09/17/2021    Procedure: COLONOSCOPY;  Surgeon: Diony Montes MD;  Location: [facility] ENDO;  Service: Endoscopy;  Laterality: N/A;    COSMETIC SURGERY  [id number]    Reconstruction    HYSTERECTOMY  2019    MASTECTOMY Left 12/26/2016    TONSILLECTOMY      TOTAL ABDOMINAL HYSTERECTOMY W/ BILATERAL SALPINGOOPHORECTOMY  2018     Family History   Problem Relation Name Age of Onset    Hypertension Mother      Colon polyps Mother      Crohn's disease Mother      Clotting disorder Mother      Coronary artery disease Mother      Hypertension Father Father     Heart disease Father Father     Breast cancer Paternal Grandmother  72    Breast cancer Maternal Cousin Shadia 59        stage 0           Review of Systems  General: negative for chills, fever or weight loss  Psychological: negative for mood changes or depression  Ophthalmic: negative for blurry vision, photophobia or eye pain  ENT: see HPI  Respiratory: no cough, shortness of breath, or wheezing  Cardiovascular: no chest pain or dyspnea on exertion  Gastrointestinal: no abdominal pain, change in bowel habits, " or black/ bloody stools  Musculoskeletal: negative for gait disturbance or muscular weakness  Neurological: no syncope or seizures; no ataxia  Dermatological: negative for puritis,  rash and jaundice  Hematologic/lymphatic: no easy bruising, no new lumps or bumps      Physical Exam:    Vitals:    03/19/25 1454   BP: 123/87   Pulse: 74       Constitutional: Well appearing / communicating without difficutly.  NAD.  Eyes: EOM I Bilaterally  Head/Face: Normocephalic.  Negative paranasal sinus pressure/tenderness.  Salivary glands WNL.  House Brackmann I Bilaterally.    Right Ear: Auricle normal appearance. External Auditory Canal within normal limits no lesions or masses,TM retracted with limited mobility.   Left Ear: Auricle normal appearance. External Auditory Canal within normal limits no lesions or masses,TM retracted with limited mobility  Nose: No gross nasal septal deviation. Inferior Turbinates 3+ bilaterally. No septal perforation. No masses/lesions. External nasal skin appears normal without masses/lesions.  Oral Cavity: Gingiva/lips within normal limits.  Dentition/gingiva healthy appearing. Mucus membranes moist. Floor of mouth soft, no masses palpated. Oral Tongue mobile. Hard Palate appears normal.    Oropharynx: Base of tongue appears normal. No masses/lesions noted. Tonsillar fossa/pharyngeal wall without lesions. Posterior oropharynx WNL.  Soft palate without masses. Midline uvula.   Neck/Lymphatic: No LAD I-VI bilaterally.  No thyromegaly.  No masses noted on exam.    Mirror laryngoscopy/nasopharyngoscopy: Active gag reflex.  Unable to perform.        Diagnostic studies:    Audiogram interpreted personally by me and discussed in detail with the patient today. Normal hearing bilaterally; tympanometry shows type A tympanograms bilaterally.       Assessment:    ICD-10-CM ICD-9-CM    1. Chronic allergic rhinitis  J30.9 477.9       2. ETD (Eustachian tube dysfunction), bilateral  H69.93 381.81       3. Ear  pressure, bilateral  H93.8X3 388.8         The primary encounter diagnosis was Chronic allergic rhinitis. Diagnoses of ETD (Eustachian tube dysfunction), bilateral and Ear pressure, bilateral were also pertinent to this visit.      Plan:  No orders of the defined types were placed in this encounter.        We had a long discussion regarding the anatomy and function of the eustachian tube.  We discussed that the eustachian tube acts as a pump to keep the appropriate amount of pressure behind the ear drum.  Continue qnasl 2 sprays per nostril daily  Continue Astelin 1 spray per nostril BID  Continue xyzal 5mg PO daily  Start medrol dose eda    Susy Tsai MD             [1]   Social History  Socioeconomic History    Marital status:    Tobacco Use    Smoking status: Never     Passive exposure: Never    Smokeless tobacco: Never   Substance and Sexual Activity    Alcohol use: Yes     Alcohol/week: 2.0 standard drinks of alcohol     Types: 2 Glasses of wine per week    Drug use: Never    Sexual activity: Yes     Partners: Male     Birth control/protection: Post-menopausal     Social Drivers of Health     Stress: Stress Concern Present (1/23/2020)    [employer] - Occupational Stress Questionnaire     Feeling of Stress : To some extent

## 2025-03-19 NOTE — PROGRESS NOTES
Ksenia Arce, a 49 y.o. female, was seen today in the clinic for an audiologic evaluation.  Patients main complaint was a 'popping' sensation in both ears, worse in the Right ear along with ear pressure.  She denies hearing loss, ear pain and ear drainage. She reports a family history of hearing loss (mother) but denies a history of noise exposure.    Audiogram results revealed normal hearing sensitivity bilaterally.  Speech reception thresholds were noted at 5 dB in the right ear and 10 dB in the left ear.  Speech discrimination scores were 100% in the right ear and 100% in the left ear.  Tympanometry revealed Type A in the right ear and Type A in the left ear. The results of the audiogram were reviewed with the patient.    Recommendations:  Otologic evaluation  Annual audiogram  Hearing protection when in noise

## 2025-04-10 RX ORDER — LEVOCETIRIZINE DIHYDROCHLORIDE 5 MG/1
5 TABLET, FILM COATED ORAL NIGHTLY
Qty: 30 TABLET | Refills: 11 | Status: SHIPPED | OUTPATIENT
Start: 2025-04-10 | End: 2026-04-10

## 2025-04-16 ENCOUNTER — PATIENT MESSAGE (OUTPATIENT)
Dept: HEMATOLOGY/ONCOLOGY | Facility: CLINIC | Age: 50
End: 2025-04-16
Payer: COMMERCIAL

## 2025-04-17 DIAGNOSIS — Z12.39 BREAST CANCER SCREENING, HIGH RISK PATIENT: ICD-10-CM

## 2025-04-17 DIAGNOSIS — Z80.3 FAMILY HISTORY OF BREAST CANCER: ICD-10-CM

## 2025-04-17 DIAGNOSIS — C50.212 MALIGNANT NEOPLASM OF UPPER-INNER QUADRANT OF LEFT BREAST IN FEMALE, ESTROGEN RECEPTOR POSITIVE: ICD-10-CM

## 2025-04-17 DIAGNOSIS — Z17.0 MALIGNANT NEOPLASM OF UPPER-INNER QUADRANT OF LEFT BREAST IN FEMALE, ESTROGEN RECEPTOR POSITIVE: ICD-10-CM

## 2025-04-17 DIAGNOSIS — C50.212 MALIGNANT NEOPLASM OF UPPER-INNER QUADRANT OF LEFT FEMALE BREAST, UNSPECIFIED ESTROGEN RECEPTOR STATUS: Primary | ICD-10-CM

## 2025-04-17 DIAGNOSIS — C50.919 INVASIVE LOBULAR CARCINOMA OF BREAST IN FEMALE: ICD-10-CM

## 2025-04-22 ENCOUNTER — TELEPHONE (OUTPATIENT)
Dept: RADIOLOGY | Facility: HOSPITAL | Age: 50
End: 2025-04-22
Payer: COMMERCIAL

## 2025-04-22 ENCOUNTER — HOSPITAL ENCOUNTER (OUTPATIENT)
Dept: RADIOLOGY | Facility: HOSPITAL | Age: 50
Discharge: HOME | End: 2025-04-22
Attending: STUDENT IN AN ORGANIZED HEALTH CARE EDUCATION/TRAINING PROGRAM
Payer: COMMERCIAL

## 2025-04-22 DIAGNOSIS — C50.212 MALIGNANT NEOPLASM OF UPPER-INNER QUADRANT OF LEFT BREAST IN FEMALE, ESTROGEN RECEPTOR POSITIVE: ICD-10-CM

## 2025-04-22 DIAGNOSIS — Z80.3 FAMILY HISTORY OF BREAST CANCER: ICD-10-CM

## 2025-04-22 DIAGNOSIS — C50.212 MALIGNANT NEOPLASM OF UPPER-INNER QUADRANT OF LEFT FEMALE BREAST, UNSPECIFIED ESTROGEN RECEPTOR STATUS: ICD-10-CM

## 2025-04-22 DIAGNOSIS — Z12.39 BREAST CANCER SCREENING, HIGH RISK PATIENT: ICD-10-CM

## 2025-04-22 DIAGNOSIS — C50.919 INVASIVE LOBULAR CARCINOMA OF BREAST IN FEMALE: ICD-10-CM

## 2025-04-22 DIAGNOSIS — Z17.0 MALIGNANT NEOPLASM OF UPPER-INNER QUADRANT OF LEFT BREAST IN FEMALE, ESTROGEN RECEPTOR POSITIVE: ICD-10-CM

## 2025-04-22 PROCEDURE — 77062 BREAST TOMOSYNTHESIS BI: CPT | Mod: TC

## 2025-04-22 PROCEDURE — 77062 BREAST TOMOSYNTHESIS BI: CPT | Mod: 26,,, | Performed by: RADIOLOGY

## 2025-04-22 PROCEDURE — 77066 DX MAMMO INCL CAD BI: CPT | Mod: 26,,, | Performed by: RADIOLOGY

## 2025-04-22 PROCEDURE — 76642 ULTRASOUND BREAST LIMITED: CPT | Mod: 26,LT,, | Performed by: RADIOLOGY

## 2025-04-22 PROCEDURE — 76642 ULTRASOUND BREAST LIMITED: CPT | Mod: TC,LT

## 2025-04-22 NOTE — TELEPHONE ENCOUNTER
----- Message from Patria sent at 4/22/2025 11:11 AM CDT -----  Regarding: Scheduling Request (This week Diagnostic)  Contact: Ksenia  SCHEDULING/REQUESTAppt Type: Est Date/Time Preference: This weekTreating Provider: Sai Mota Name: KseniaContact Preference: [phone] (home) Comments/Notes: Pt states that she will be losing her ins this week and her next one doesn't start until May. Would like to try and be seen before the weeks end. Would like a call back to further discuss scheduling options

## 2025-04-23 ENCOUNTER — RESULTS FOLLOW-UP (OUTPATIENT)
Dept: HEMATOLOGY/ONCOLOGY | Facility: CLINIC | Age: 50
End: 2025-04-23

## 2025-05-05 ENCOUNTER — PATIENT MESSAGE (OUTPATIENT)
Dept: OTOLARYNGOLOGY | Facility: CLINIC | Age: 50
End: 2025-05-05
Payer: COMMERCIAL

## 2025-05-07 NOTE — TELEPHONE ENCOUNTER
Spoke to pt and inform her that Dr Guerrero is out sick and she will advise when she gets back, pt gives verbal understanding and thanked me.  Yuki

## 2025-06-09 DIAGNOSIS — G43.709 CHRONIC MIGRAINE W/O AURA W/O STATUS MIGRAINOSUS, NOT INTRACTABLE: Primary | ICD-10-CM

## 2025-06-11 ENCOUNTER — TELEPHONE (OUTPATIENT)
Dept: NEUROLOGY | Facility: CLINIC | Age: 50
End: 2025-06-11
Payer: COMMERCIAL

## 2025-06-11 NOTE — TELEPHONE ENCOUNTER
I called patient to reschedule her BOTOX that is scheduled for 06- due to Dr. Valdivia being out. She did not answer I left her a message to call and get rescheduled because I will go ahead and cancel the appointment.

## 2025-06-16 ENCOUNTER — TELEPHONE (OUTPATIENT)
Dept: PODIATRY | Facility: CLINIC | Age: 50
End: 2025-06-16
Payer: COMMERCIAL

## 2025-06-16 NOTE — TELEPHONE ENCOUNTER
PLEASE DO NOT CANCEL THE PATIENT APPOINTMENT THAT IS SCHEDULED ON 09-. THAT IS HER NEXT BOTOX WITH DOCTOR LOVE.

## 2025-06-17 ENCOUNTER — TELEPHONE (OUTPATIENT)
Dept: NEUROLOGY | Facility: CLINIC | Age: 50
End: 2025-06-17
Payer: COMMERCIAL

## 2025-06-17 NOTE — TELEPHONE ENCOUNTER
I called patient on 06- and on this morning she did not answer I left her a message. I need to reschedule her BOTOX that was scheduled for 06- due to  being on vacation.   Yes

## 2025-06-24 ENCOUNTER — PATIENT MESSAGE (OUTPATIENT)
Dept: NEUROLOGY | Facility: CLINIC | Age: 50
End: 2025-06-24
Payer: COMMERCIAL

## 2025-07-01 ENCOUNTER — PATIENT MESSAGE (OUTPATIENT)
Dept: NEUROLOGY | Facility: CLINIC | Age: 50
End: 2025-07-01
Payer: COMMERCIAL

## 2025-07-15 ENCOUNTER — PROCEDURE VISIT (OUTPATIENT)
Dept: NEUROLOGY | Facility: CLINIC | Age: 50
End: 2025-07-15
Payer: COMMERCIAL

## 2025-07-15 VITALS
SYSTOLIC BLOOD PRESSURE: 111 MMHG | WEIGHT: 147 LBS | HEIGHT: 65 IN | DIASTOLIC BLOOD PRESSURE: 75 MMHG | BODY MASS INDEX: 24.49 KG/M2

## 2025-07-15 DIAGNOSIS — G43.709 CHRONIC MIGRAINE W/O AURA W/O STATUS MIGRAINOSUS, NOT INTRACTABLE: ICD-10-CM

## 2025-07-15 PROCEDURE — 64615 CHEMODENERV MUSC MIGRAINE: CPT | Mod: S$GLB,,, | Performed by: PSYCHIATRY & NEUROLOGY

## 2025-07-15 RX ORDER — BUTALBITAL, ACETAMINOPHEN, CAFFEINE AND CODEINE PHOSPHATE 300; 50; 40; 30 MG/1; MG/1; MG/1; MG/1
CAPSULE ORAL
Qty: 12 CAPSULE | Refills: 0 | Status: SHIPPED | OUTPATIENT
Start: 2025-07-15

## 2025-07-15 NOTE — PROGRESS NOTES
Subjective:       Patient ID: Ksenia Arce is a 49 y.o. female.    Chief Complaint: Botulinum Toxin Injection (Patient is here for BOTOX.)      HPI  Past Medical History:   Diagnosis Date    Breast cancer 05/2016    ER+ ILC    History of antineoplastic chemotherapy 2016    6/30/2016-11/16/2016    History of therapeutic radiation 2017    left chest, completed 5/10/17, dose 9500 cGy    Mental disorder     Migraine headache     Neuropathy       Past Surgical History:   Procedure Laterality Date    BREAST CAPSULECTOMY      BREAST RECONSTRUCTION      COLONOSCOPY N/A 09/17/2021    Procedure: COLONOSCOPY;  Surgeon: Diony Montes MD;  Location: [facility] ENDO;  Service: Endoscopy;  Laterality: N/A;    COSMETIC SURGERY  [id number]    Reconstruction    HYSTERECTOMY  2019    MASTECTOMY Left 12/26/2016    TONSILLECTOMY      TOTAL ABDOMINAL HYSTERECTOMY W/ BILATERAL SALPINGOOPHORECTOMY  2018      Current Medications[1]   Review of patient's allergies indicates:  No Known Allergies     Review of Systems        Objective:      Physical Exam      Assessment:       1. Chronic migraine w/o aura w/o status migrainosus, not intractable        Plan:                        Shadia Valdivia MD   07/15/2025   6:08 PM          [1]   Current Outpatient Medications:     anastrozole (ARIMIDEX) 1 mg Tab, Take 1 tablet (1 mg total) by mouth once daily., Disp: 90 tablet, Rfl: 6    ascorbic acid, vitamin C, (VITAMIN C) 500 MG tablet, Take 500 mg by mouth., Disp: , Rfl:     atogepant (QULIPTA) 60 mg Tab, Take 1 tablet (60 mg) by mouth once daily., Disp: 90 tablet, Rfl: 3    beclomethasone dipropionate (QNASL) 80 mcg/actuation HFAA, Use 2 sprays (160 mcg) by Nasal route once daily., Disp: 10.6 g, Rfl: 6    benazepril-hydrochlorthiazide (LOTENSIN HCT) 10-12.5 mg Tab, Take 1 tablet by mouth once daily., Disp: 90 tablet, Rfl: 3    calcium carbonate (TUMS) 200 mg calcium (500 mg) chewable tablet, Take 1 tablet by mouth., Disp: , Rfl:     CALCIUM GLUCONATE  ORAL, Take by mouth., Disp: , Rfl:     FLUoxetine 40 MG capsule, Take 1 capsule (40 mg total) by mouth every morning., Disp: 90 capsule, Rfl: 3    levocetirizine (XYZAL) 5 MG tablet, Take 1 tablet (5 mg total) by mouth every evening., Disp: 30 tablet, Rfl: 11    meloxicam (MOBIC) 15 MG tablet, Take 1 tablet (15 mg total) by mouth once daily., Disp: 14 tablet, Rfl: 0    methylPREDNISolone (MEDROL DOSEPACK) 4 mg tablet, use as directed on package, Disp: 21 each, Rfl: 0    naproxen sodium (ANAPROX) 550 MG tablet, Take 1 tablet (550 mg total) by mouth every 8 (eight) hours., Disp: 30 tablet, Rfl: 2    ondansetron (ZOFRAN-ODT) 8 MG TbDL, Take 1 tablet (8 mg total) by mouth 3 (three) times daily as needed (nausea or motion sickness)., Disp: 30 tablet, Rfl: 3    rosuvastatin (CRESTOR) 5 MG tablet, Take 1 tablet (5 mg total) by mouth once daily., Disp: 90 tablet, Rfl: 3    scopolamine (TRANSDERM-SCOP) 1.3-1.5 mg (1 mg over 3 days), Place 1 patch onto the skin every 72 hours., Disp: 6 patch, Rfl: 0    turmeric-ging-olive-oreg-capry 100 mg-150 mg- 50 mg-150 mg Cap, Take by mouth., Disp: , Rfl:     ubrogepant (UBRELVY) 100 mg tablet, Take 1 tablet (100 mg total) by mouth as instructed for Migraine. May repeat once after 2 hours. No more than 2 tablets in 24 hours., Disp: 48 tablet, Rfl: 3    ALPRAZolam (XANAX) 0.25 MG tablet, Take 1 tablet (0.25 mg total) by mouth 2 (two) times daily as needed for Anxiety., Disp: 60 tablet, Rfl: 0    azelastine (ASTELIN) 137 mcg (0.1 %) nasal spray, 1 spray (137 mcg total) by Nasal route 2 (two) times daily., Disp: 30 mL, Rfl: 3    butalbitaL-acetaminop-caf-cod (FIORICET WITH CODEINE) -86-30 mg Cap, Take 1 capsule by mouth every 6 hours as needed for migraine, Disp: 12 capsule, Rfl: 0    gabapentin (NEURONTIN) 300 MG capsule, Take 1 capsule (300 mg total) by mouth 3 (three) times daily., Disp: 270 capsule, Rfl: 1    Current Facility-Administered Medications:     onabotulinumtoxina  injection 200 Units, 200 Units, Intramuscular, Q90 Days, Shadia Valdivia MD, 200 Units at 09/24/24 1540    onabotulinumtoxina injection 200 Units, 200 Units, Intramuscular, Q90 Days,     Facility-Administered Medications Ordered in Other Visits:     onabotulinumtoxina injection 200 Units, 200 Units, Intramuscular, , Shadia Valdivia MD, 200 Units at 03/11/25 0920

## 2025-07-15 NOTE — PROCEDURES
Botulinum Injection  Location: Head/Face/Jaw    Date/Time: 7/15/2025 4:00 PM    Performed by: Shadia Valdivia MD  Authorized by: Shadia Valdivia MD      Consent:      Consent obtained:  Verbal    Universal protocol:      Patient identity confirmed:  Verbally with patient    Procedure details:      Diluted by:  Preservative free saline     Laterality: Bilateral     Toxin (Brand):  OnaBoNT-A (Botox)     Concentration (u/mL):  2:1     Total units available:  200       Right frontalis:  10 units divided amongst 3 site(s)     Left frontalis:  10 units divided amongst 3 site(s)     Right :  5 units divided amongst 1 site(s)     Left :  5 units divided amongst 1 site(s)     Procerus (midline):  5 units divided amongst 1 site(s)     Right occipitalis:  20 units divided amongst 4 site(s)     Left occipitalis:  20 units divided amongst 4 site(s)     Right cervical paraspinal:  10 units divided amongst 2 site(s)     Left cervical paraspinal:  10 units divided amongst 2 site(s)     Right trapezius:  25 units divided amongst 2 site(s)     Left trapezius:  25 units divided amongst 2 site(s)     Right temporalis:  20 units divided amongst 4 site(s)     Left temporalis:  20 units divided amongst 4 site(s)         Total units injected:  185     Total units wasted:  15  Medications: 200 Units onabotulinumtoxina 200 unit    Post-procedure details:      Patient tolerance of procedure:  Tolerated well, no immediate complications  Comments: Having a lot of migraines lately due to be an overdue for Botox ( insurance issues)

## 2025-07-17 NOTE — PROGRESS NOTES
Subjective:      Patient ID: Ksenia Arce is a 49 y.o. female.    Chief Complaint:  Osteoporosis    History of Present Illness  Ksenia Arce is here for evaluation and management of Osteopenia.  Referred by Dr Felipe.  This is their first visit with me.    This is a [employer] video visit.    The patient location is: [place]  The chief complaint leading to consultation is: Osteopenia  Visit type: Virtual visit with synchronous audio and video  Total time spent with patient: see below  Each patient to whom he or she provides medical services by telemedicine is:  (1) informed of the relationship between the physician and patient and the respective role of any other health care provider with respect to management of the patient; and (2) notified that he or she may decline to receive medical services by telemedicine and may withdraw from such care at any time.      With regards to Osteopenia:     BMD:   5/27/2024  The L1 to L4 vertebral bone mineral density is equal to 1.200 g/cm squared with a T score of 0.0.  Previously, -0.2  The left femoral neck bone mineral density is equal to 0.970 g/cm squared with a T score of -0.5.  Previously, -0.8  The right femoral neck bone mineral density is equal to 0.980 g/cm squared with a T score of -0.4.  Previously, -1.0  There is a 3.9% risk of a major osteoporotic fracture and a 0.1% risk of hip fracture in the next 10 years (FRAX).  Impression:  Normal bone mineral density.       Medications:   Prolia  Start 3/2022  Last dose: 5/2024    Calcium intake: dietary sources   Vit D intake: None     Weight bearing exercise: walking  Falls: Denies  Fractures: Denies  Significant height loss (>2 inches): Denies     Family history: mother   Denies parental hip fracture     Menopause: total hysterectomy 2019    Tobacco Use: Denies   Alcohol Use: social   Glucocorticoid History: Denies   Anticoagulant Use: Denies  GERD/PPI Use: GERD. Not on a PPI.   History of Malabsorption: Denies    Antiseizure Medications: Denies   History of Thyroid Disease: Denies personal history of thyroid disease, father had Hypothyroidism   Kidney Disease: Denies  Personal history of kidney stones: once - many years ago  Family history of kidney stones: Denies   MI: Denies  Stroke: Denies  Dental Visit: UTD - denies plans for major dental work   Breast cancer - s/p na chemotherapy, L mastectomy, adjuvant radiation and remains on anastrozole since 12/2017.    FH of DM: denies     Lab Results   Component Value Date    CALCIUM 9.6 08/21/2024     Vit D, 25-Hydroxy   Date Value Ref Range Status   08/21/2024 30 30 - 96 ng/mL Final     Comment:     Vitamin D deficiency.........<10 ng/mL                              Vitamin D insufficiency......10-29 ng/mL       Vitamin D sufficiency........> or equal to 30 ng/mL  Vitamin D toxicity............>100 ng/mL         Review of Systems  + hair loss    Visit Vitals  LMP 11/27/2013       There is no height or weight on file to calculate BMI.    Lab Review:   Lab Results   Component Value Date    HGBA1C 4.8 11/17/2023       Lab Results   Component Value Date    CHOL 164 12/03/2024    HDL 60 12/03/2024    LDLCALC 71.0 12/03/2024    TRIG 165 (H) 12/03/2024    CHOLHDL 36.6 12/03/2024     Lab Results   Component Value Date     08/21/2024    K 4.6 08/21/2024     08/21/2024    CO2 25 08/21/2024    GLU 94 08/21/2024    BUN 14 08/21/2024    CREATININE 0.7 08/21/2024    CALCIUM 9.6 08/21/2024    PROT 7.6 12/03/2024    ALBUMIN 4.4 12/03/2024    BILITOT 0.8 12/03/2024    ALKPHOS 71 12/03/2024    AST 29 12/03/2024    ALT 54 (H) 12/03/2024    ANIONGAP 8 08/21/2024    ESTGFRAFRICA >60.0 06/24/2022    EGFRNONAA >60.0 06/24/2022    TSH 1.771 08/17/2021     Vit D, 25-Hydroxy   Date Value Ref Range Status   08/21/2024 30 30 - 96 ng/mL Final     Comment:     Vitamin D deficiency.........<10 ng/mL                              Vitamin D insufficiency......10-29 ng/mL       Vitamin D  sufficiency........> or equal to 30 ng/mL  Vitamin D toxicity............>100 ng/mL       Assessment and Plan     1. Osteopenia of multiple sites  Ambulatory referral/consult to Endocrinology    TSH    Renal Function Panel    Vitamin D    C Telopeptide (CTX), Serum          Osteopenia of multiple sites  -- Risks include  race, menopause, ETOH,  +FH, AI use.  -- Reviewed basics of quantity versus quality.  -- Reassuring they are not fracturing.  -- Recommend:  -Pharmacological therapy is recommended for patients with osteopenia if the 10 year probability of a hip fracture is >3% and 10 year probability of other major osteoporotic fractures is >20%.  Treatment options and potential side effects discussed for PO bisphosphonates, reclast, Denosumab, and Teriparatide.   -Treatment:   Prolia  Start 3/2022  Last dose: 5/2024    -Calcium and Vitamin D RDD provided.  -Exercise: recommended.  -Fall precautions made in the home.  -Alerted that if dental work needs to be done it should be done prior to initiating therapy. Dental health: UTD.  -- Repeat DEXA scan 5/2026.  -- Labs now - CTX, Vitamin D, TSH, RFP.   -- Off Prolia since 5/2024. Given increase in bone resorption after discontinuing Prolia will plan to treat with a dose of Reclast.       Follow up in about 1 year (around 7/24/2026).      I spent 30 minutes face-to-face with the patient, over half of the visit was spent on counseling and/or coordinating the care of the patient.    Counseling includes:  Diagnostic results, impressions, recommendations   Prognosis   Risk and benefits of management/treatment options   Instructions for management treatment and or follow-up   Importance of compliance with management   Risk factor reduction   Patient education    Visit today included increased complexity associated with the care of the problems addressed and managing the longitudinal care of the patient due to the serious and/or complex managed problems.

## 2025-07-24 ENCOUNTER — PATIENT MESSAGE (OUTPATIENT)
Dept: ENDOCRINOLOGY | Facility: CLINIC | Age: 50
End: 2025-07-24

## 2025-07-24 ENCOUNTER — OFFICE VISIT (OUTPATIENT)
Dept: ENDOCRINOLOGY | Facility: CLINIC | Age: 50
End: 2025-07-24
Payer: COMMERCIAL

## 2025-07-24 DIAGNOSIS — M85.89 OSTEOPENIA OF MULTIPLE SITES: ICD-10-CM

## 2025-07-24 NOTE — ASSESSMENT & PLAN NOTE
-- Risks include  race, menopause, ETOH,  +FH, AI use.  -- Reviewed basics of quantity versus quality.  -- Reassuring they are not fracturing.  -- Recommend:  -Pharmacological therapy is recommended for patients with osteopenia if the 10 year probability of a hip fracture is >3% and 10 year probability of other major osteoporotic fractures is >20%.  Treatment options and potential side effects discussed for PO bisphosphonates, reclast, Denosumab, and Teriparatide.   -Treatment:   Prolia  Start 3/2022  Last dose: 5/2024    -Calcium and Vitamin D RDD provided.  -Exercise: recommended.  -Fall precautions made in the home.  -Alerted that if dental work needs to be done it should be done prior to initiating therapy. Dental health: UTD.  -- Repeat DEXA scan 5/2026.  -- Labs now - CTX, Vitamin D, TSH, RFP.   -- Off Prolia since 5/2024. Given increase in bone resorption after discontinuing Prolia will plan to treat with a dose of Reclast.

## 2025-07-24 NOTE — Clinical Note
Fasting labs now Rtc 1 year  Orders Placed This Encounter     TSH     Renal Function Panel     Vitamin D     C Telopeptide (CTX), Serum

## 2025-07-31 ENCOUNTER — PATIENT MESSAGE (OUTPATIENT)
Dept: ENDOCRINOLOGY | Facility: CLINIC | Age: 50
End: 2025-07-31
Payer: COMMERCIAL

## 2025-07-31 DIAGNOSIS — M81.0 OSTEOPOROSIS, UNSPECIFIED OSTEOPOROSIS TYPE, UNSPECIFIED PATHOLOGICAL FRACTURE PRESENCE: Primary | ICD-10-CM

## 2025-07-31 RX ORDER — SODIUM CHLORIDE 0.9 % (FLUSH) 0.9 %
10 SYRINGE (ML) INJECTION
OUTPATIENT
Start: 2025-07-31

## 2025-07-31 RX ORDER — HEPARIN 100 UNIT/ML
500 SYRINGE INTRAVENOUS
OUTPATIENT
Start: 2025-07-31

## 2025-07-31 RX ORDER — ZOLEDRONIC ACID 5 MG/100ML
5 INJECTION, SOLUTION INTRAVENOUS
OUTPATIENT
Start: 2025-07-31

## 2025-07-31 NOTE — TELEPHONE ENCOUNTER
Component      Latest Ref Rng 7/28/2025   Sodium      136 - 145 mmol/L 140    Potassium      3.5 - 5.1 mmol/L 4.1    Chloride      95 - 110 mmol/L 106    CO2      23 - 29 mmol/L 28    Glucose      70 - 110 mg/dL 96    BUN      6 - 20 mg/dL 18    Creatinine      0.5 - 1.4 mg/dL 0.7    Calcium      8.7 - 10.5 mg/dL 9.9    Albumin      3.5 - 5.2 g/dL 4.6    Phosphorus Level      2.7 - 4.5 mg/dL 4.2    Anion Gap      8 - 16 mmol/L 6 (L)    eGFR      >60 mL/min/1.73/m2 >60    TSH      0.400 - 4.000 uIU/mL 2.358    Vitamin D      30 - 96 ng/mL 36    Beta-CrossLaps (B-CTx)      pg/mL 1998 (H)       Legend:  (L) Low  (H) High

## 2025-07-31 NOTE — TELEPHONE ENCOUNTER
Discussed results with patient. Start otc vitamin D3 1000iu daily. Reclast consent obtained. CTX 3 months after.

## 2025-08-06 DIAGNOSIS — G43.709 CHRONIC MIGRAINE W/O AURA W/O STATUS MIGRAINOSUS, NOT INTRACTABLE: ICD-10-CM

## 2025-08-06 RX ORDER — BUTALBITAL, ACETAMINOPHEN, CAFFEINE AND CODEINE PHOSPHATE 300; 50; 40; 30 MG/1; MG/1; MG/1; MG/1
CAPSULE ORAL
Qty: 12 CAPSULE | Refills: 0 | Status: CANCELLED | OUTPATIENT
Start: 2025-08-06

## 2025-08-07 DIAGNOSIS — G43.709 CHRONIC MIGRAINE W/O AURA W/O STATUS MIGRAINOSUS, NOT INTRACTABLE: ICD-10-CM

## 2025-08-08 ENCOUNTER — PATIENT OUTREACH (OUTPATIENT)
Dept: ADMINISTRATIVE | Facility: HOSPITAL | Age: 50
End: 2025-08-08
Payer: COMMERCIAL

## 2025-08-11 ENCOUNTER — PATIENT MESSAGE (OUTPATIENT)
Dept: NEUROLOGY | Facility: CLINIC | Age: 50
End: 2025-08-11
Payer: COMMERCIAL

## 2025-08-11 ENCOUNTER — INFUSION (OUTPATIENT)
Dept: INFECTIOUS DISEASES | Facility: HOSPITAL | Age: 50
End: 2025-08-11
Payer: COMMERCIAL

## 2025-08-11 ENCOUNTER — OFFICE VISIT (OUTPATIENT)
Dept: PRIMARY CARE CLINIC | Facility: CLINIC | Age: 50
End: 2025-08-11
Payer: COMMERCIAL

## 2025-08-11 VITALS
DIASTOLIC BLOOD PRESSURE: 70 MMHG | HEART RATE: 91 BPM | WEIGHT: 150.81 LBS | OXYGEN SATURATION: 98 % | BODY MASS INDEX: 25.09 KG/M2 | SYSTOLIC BLOOD PRESSURE: 122 MMHG

## 2025-08-11 VITALS
WEIGHT: 150.38 LBS | HEART RATE: 78 BPM | SYSTOLIC BLOOD PRESSURE: 114 MMHG | BODY MASS INDEX: 25.05 KG/M2 | HEIGHT: 65 IN | DIASTOLIC BLOOD PRESSURE: 73 MMHG | RESPIRATION RATE: 19 BRPM | OXYGEN SATURATION: 99 % | TEMPERATURE: 98 F

## 2025-08-11 DIAGNOSIS — F41.1 GENERALIZED ANXIETY DISORDER: ICD-10-CM

## 2025-08-11 DIAGNOSIS — M81.0 OSTEOPOROSIS, UNSPECIFIED OSTEOPOROSIS TYPE, UNSPECIFIED PATHOLOGICAL FRACTURE PRESENCE: ICD-10-CM

## 2025-08-11 DIAGNOSIS — E78.5 HYPERLIPIDEMIA, UNSPECIFIED HYPERLIPIDEMIA TYPE: ICD-10-CM

## 2025-08-11 DIAGNOSIS — L65.9 HAIR THINNING: Primary | ICD-10-CM

## 2025-08-11 DIAGNOSIS — M85.89 OSTEOPENIA OF MULTIPLE SITES: Primary | ICD-10-CM

## 2025-08-11 PROCEDURE — 25000003 PHARM REV CODE 250: Performed by: NURSE PRACTITIONER

## 2025-08-11 PROCEDURE — 3008F BODY MASS INDEX DOCD: CPT | Mod: CPTII,S$GLB,, | Performed by: STUDENT IN AN ORGANIZED HEALTH CARE EDUCATION/TRAINING PROGRAM

## 2025-08-11 PROCEDURE — 96365 THER/PROPH/DIAG IV INF INIT: CPT

## 2025-08-11 PROCEDURE — 63600175 PHARM REV CODE 636 W HCPCS: Performed by: NURSE PRACTITIONER

## 2025-08-11 PROCEDURE — 99999 PR PBB SHADOW E&M-EST. PATIENT-LVL IV: CPT | Mod: PBBFAC,,, | Performed by: STUDENT IN AN ORGANIZED HEALTH CARE EDUCATION/TRAINING PROGRAM

## 2025-08-11 PROCEDURE — 3074F SYST BP LT 130 MM HG: CPT | Mod: CPTII,S$GLB,, | Performed by: STUDENT IN AN ORGANIZED HEALTH CARE EDUCATION/TRAINING PROGRAM

## 2025-08-11 PROCEDURE — 99214 OFFICE O/P EST MOD 30 MIN: CPT | Mod: S$GLB,,, | Performed by: STUDENT IN AN ORGANIZED HEALTH CARE EDUCATION/TRAINING PROGRAM

## 2025-08-11 PROCEDURE — 3078F DIAST BP <80 MM HG: CPT | Mod: CPTII,S$GLB,, | Performed by: STUDENT IN AN ORGANIZED HEALTH CARE EDUCATION/TRAINING PROGRAM

## 2025-08-11 RX ORDER — ZOLEDRONIC ACID 5 MG/100ML
5 INJECTION, SOLUTION INTRAVENOUS
OUTPATIENT
Start: 2025-08-11

## 2025-08-11 RX ORDER — ROSUVASTATIN CALCIUM 5 MG/1
5 TABLET, COATED ORAL DAILY
Qty: 90 TABLET | Refills: 3 | Status: SHIPPED | OUTPATIENT
Start: 2025-08-11 | End: 2026-08-11

## 2025-08-11 RX ORDER — FLUOXETINE HYDROCHLORIDE 40 MG/1
40 CAPSULE ORAL EVERY MORNING
Qty: 90 CAPSULE | Refills: 3 | Status: SHIPPED | OUTPATIENT
Start: 2025-08-11

## 2025-08-11 RX ORDER — HEPARIN 100 UNIT/ML
500 SYRINGE INTRAVENOUS
OUTPATIENT
Start: 2025-08-11

## 2025-08-11 RX ORDER — SODIUM CHLORIDE 0.9 % (FLUSH) 0.9 %
10 SYRINGE (ML) INJECTION
OUTPATIENT
Start: 2025-08-11

## 2025-08-11 RX ORDER — SODIUM CHLORIDE 0.9 % (FLUSH) 0.9 %
10 SYRINGE (ML) INJECTION
Status: DISCONTINUED | OUTPATIENT
Start: 2025-08-11 | End: 2025-08-11 | Stop reason: HOSPADM

## 2025-08-11 RX ORDER — ZOLEDRONIC ACID 5 MG/100ML
5 INJECTION, SOLUTION INTRAVENOUS
Status: COMPLETED | OUTPATIENT
Start: 2025-08-11 | End: 2025-08-11

## 2025-08-11 RX ADMIN — ZOLEDRONIC ACID 5 MG: 5 INJECTION, SOLUTION INTRAVENOUS at 03:08

## 2025-08-11 RX ADMIN — SODIUM CHLORIDE: 9 INJECTION, SOLUTION INTRAVENOUS at 03:08

## 2025-08-28 ENCOUNTER — OFFICE VISIT (OUTPATIENT)
Dept: HEMATOLOGY/ONCOLOGY | Facility: CLINIC | Age: 50
End: 2025-08-28
Payer: COMMERCIAL

## 2025-08-28 VITALS
HEIGHT: 65 IN | HEART RATE: 93 BPM | WEIGHT: 150.13 LBS | SYSTOLIC BLOOD PRESSURE: 120 MMHG | DIASTOLIC BLOOD PRESSURE: 89 MMHG | OXYGEN SATURATION: 99 % | BODY MASS INDEX: 25.01 KG/M2 | RESPIRATION RATE: 17 BRPM | TEMPERATURE: 97 F

## 2025-08-28 DIAGNOSIS — C77.9 SECONDARY ADENOCARCINOMA OF LYMPH NODE: ICD-10-CM

## 2025-08-28 DIAGNOSIS — M85.89 OSTEOPENIA OF MULTIPLE SITES: ICD-10-CM

## 2025-08-28 DIAGNOSIS — F41.1 GENERALIZED ANXIETY DISORDER: ICD-10-CM

## 2025-08-28 DIAGNOSIS — G43.009 MIGRAINE WITHOUT AURA AND WITHOUT STATUS MIGRAINOSUS, NOT INTRACTABLE: ICD-10-CM

## 2025-08-28 DIAGNOSIS — C50.212 MALIGNANT NEOPLASM OF UPPER-INNER QUADRANT OF LEFT BREAST IN FEMALE, ESTROGEN RECEPTOR POSITIVE: Primary | ICD-10-CM

## 2025-08-28 DIAGNOSIS — G62.0 PERIPHERAL NEUROPATHY DUE TO CHEMOTHERAPY: ICD-10-CM

## 2025-08-28 DIAGNOSIS — C50.212 MALIGNANT NEOPLASM OF UPPER-INNER QUADRANT OF LEFT FEMALE BREAST, UNSPECIFIED ESTROGEN RECEPTOR STATUS: ICD-10-CM

## 2025-08-28 DIAGNOSIS — Z17.0 MALIGNANT NEOPLASM OF UPPER-INNER QUADRANT OF LEFT BREAST IN FEMALE, ESTROGEN RECEPTOR POSITIVE: Primary | ICD-10-CM

## 2025-08-28 DIAGNOSIS — T45.1X5A PERIPHERAL NEUROPATHY DUE TO CHEMOTHERAPY: ICD-10-CM

## 2025-08-28 PROCEDURE — 3008F BODY MASS INDEX DOCD: CPT | Mod: CPTII,S$GLB,, | Performed by: PHYSICIAN ASSISTANT

## 2025-08-28 PROCEDURE — 99213 OFFICE O/P EST LOW 20 MIN: CPT | Mod: S$GLB,,, | Performed by: PHYSICIAN ASSISTANT

## 2025-08-28 PROCEDURE — 3074F SYST BP LT 130 MM HG: CPT | Mod: CPTII,S$GLB,, | Performed by: PHYSICIAN ASSISTANT

## 2025-08-28 PROCEDURE — 99999 PR PBB SHADOW E&M-EST. PATIENT-LVL V: CPT | Mod: PBBFAC,,, | Performed by: PHYSICIAN ASSISTANT

## 2025-08-28 PROCEDURE — 1159F MED LIST DOCD IN RCRD: CPT | Mod: CPTII,S$GLB,, | Performed by: PHYSICIAN ASSISTANT

## 2025-08-28 PROCEDURE — 3079F DIAST BP 80-89 MM HG: CPT | Mod: CPTII,S$GLB,, | Performed by: PHYSICIAN ASSISTANT

## 2025-08-28 RX ORDER — ANASTROZOLE 1 MG/1
1 TABLET ORAL DAILY
Qty: 90 TABLET | Refills: 6 | Status: SHIPPED | OUTPATIENT
Start: 2025-08-28

## 2025-08-29 ENCOUNTER — TELEPHONE (OUTPATIENT)
Dept: OTOLARYNGOLOGY | Facility: CLINIC | Age: 50
End: 2025-08-29
Payer: COMMERCIAL